# Patient Record
Sex: FEMALE | Race: WHITE | NOT HISPANIC OR LATINO | URBAN - METROPOLITAN AREA
[De-identification: names, ages, dates, MRNs, and addresses within clinical notes are randomized per-mention and may not be internally consistent; named-entity substitution may affect disease eponyms.]

---

## 2017-01-13 ENCOUNTER — EMERGENCY (EMERGENCY)
Facility: HOSPITAL | Age: 77
LOS: 1 days | Discharge: HOME | End: 2017-01-13
Attending: EMERGENCY MEDICINE | Admitting: EMERGENCY MEDICINE
Payer: MEDICARE

## 2017-01-13 VITALS
RESPIRATION RATE: 20 BRPM | WEIGHT: 143.96 LBS | HEIGHT: 64 IN | HEART RATE: 82 BPM | OXYGEN SATURATION: 98 % | TEMPERATURE: 98 F | DIASTOLIC BLOOD PRESSURE: 138 MMHG | SYSTOLIC BLOOD PRESSURE: 171 MMHG

## 2017-01-13 VITALS
RESPIRATION RATE: 18 BRPM | HEART RATE: 74 BPM | DIASTOLIC BLOOD PRESSURE: 72 MMHG | TEMPERATURE: 98 F | SYSTOLIC BLOOD PRESSURE: 140 MMHG | OXYGEN SATURATION: 97 %

## 2017-01-13 DIAGNOSIS — Z98.49 CATARACT EXTRACTION STATUS, UNSPECIFIED EYE: Chronic | ICD-10-CM

## 2017-01-13 DIAGNOSIS — Z98.89 OTHER SPECIFIED POSTPROCEDURAL STATES: Chronic | ICD-10-CM

## 2017-01-13 LAB
ALBUMIN SERPL ELPH-MCNC: 4.3 G/DL — SIGNIFICANT CHANGE UP (ref 3.3–5.2)
ALP SERPL-CCNC: 78 U/L — SIGNIFICANT CHANGE UP (ref 40–120)
ALT FLD-CCNC: 9 U/L — SIGNIFICANT CHANGE UP
ANION GAP SERPL CALC-SCNC: 16 MMOL/L — SIGNIFICANT CHANGE UP (ref 5–17)
AST SERPL-CCNC: 13 U/L — SIGNIFICANT CHANGE UP
BILIRUB SERPL-MCNC: 0.2 MG/DL — LOW (ref 0.4–2)
BUN SERPL-MCNC: 15 MG/DL — SIGNIFICANT CHANGE UP (ref 8–20)
CALCIUM SERPL-MCNC: 9.2 MG/DL — SIGNIFICANT CHANGE UP (ref 8.6–10.2)
CHLORIDE SERPL-SCNC: 101 MMOL/L — SIGNIFICANT CHANGE UP (ref 98–107)
CK SERPL-CCNC: 48 U/L — SIGNIFICANT CHANGE UP (ref 25–170)
CO2 SERPL-SCNC: 23 MMOL/L — SIGNIFICANT CHANGE UP (ref 22–29)
CREAT SERPL-MCNC: 0.62 MG/DL — SIGNIFICANT CHANGE UP (ref 0.5–1.3)
GLUCOSE SERPL-MCNC: 101 MG/DL — SIGNIFICANT CHANGE UP (ref 70–115)
HCT VFR BLD CALC: 41.7 % — SIGNIFICANT CHANGE UP (ref 37–47)
HGB BLD-MCNC: 13.8 G/DL — SIGNIFICANT CHANGE UP (ref 12–16)
MCHC RBC-ENTMCNC: 27.2 PG — SIGNIFICANT CHANGE UP (ref 27–31)
MCHC RBC-ENTMCNC: 33.1 G/DL — SIGNIFICANT CHANGE UP (ref 32–36)
MCV RBC AUTO: 82.1 FL — SIGNIFICANT CHANGE UP (ref 81–99)
PLATELET # BLD AUTO: 176 K/UL — SIGNIFICANT CHANGE UP (ref 150–400)
POTASSIUM SERPL-MCNC: 4.1 MMOL/L — SIGNIFICANT CHANGE UP (ref 3.5–5.3)
POTASSIUM SERPL-SCNC: 4.1 MMOL/L — SIGNIFICANT CHANGE UP (ref 3.5–5.3)
PROT SERPL-MCNC: 7.4 G/DL — SIGNIFICANT CHANGE UP (ref 6.6–8.7)
RBC # BLD: 5.08 M/UL — SIGNIFICANT CHANGE UP (ref 4.4–5.2)
RBC # FLD: 14 % — SIGNIFICANT CHANGE UP (ref 11–15.6)
SODIUM SERPL-SCNC: 140 MMOL/L — SIGNIFICANT CHANGE UP (ref 135–145)
TROPONIN T SERPL-MCNC: <0.01 NG/ML — SIGNIFICANT CHANGE UP (ref 0–0.06)
WBC # BLD: 7.59 K/UL — SIGNIFICANT CHANGE UP (ref 4.8–10.8)
WBC # FLD AUTO: 7.59 K/UL — SIGNIFICANT CHANGE UP (ref 4.8–10.8)

## 2017-01-13 PROCEDURE — 70450 CT HEAD/BRAIN W/O DYE: CPT | Mod: 26

## 2017-01-13 PROCEDURE — 70450 CT HEAD/BRAIN W/O DYE: CPT

## 2017-01-13 PROCEDURE — 93005 ELECTROCARDIOGRAM TRACING: CPT

## 2017-01-13 PROCEDURE — 84484 ASSAY OF TROPONIN QUANT: CPT

## 2017-01-13 PROCEDURE — 80053 COMPREHEN METABOLIC PANEL: CPT

## 2017-01-13 PROCEDURE — 99284 EMERGENCY DEPT VISIT MOD MDM: CPT

## 2017-01-13 PROCEDURE — 85027 COMPLETE CBC AUTOMATED: CPT

## 2017-01-13 PROCEDURE — 93010 ELECTROCARDIOGRAM REPORT: CPT

## 2017-01-13 PROCEDURE — 99284 EMERGENCY DEPT VISIT MOD MDM: CPT | Mod: 25

## 2017-01-13 PROCEDURE — 82550 ASSAY OF CK (CPK): CPT

## 2017-01-13 RX ORDER — SODIUM CHLORIDE 9 MG/ML
3 INJECTION INTRAMUSCULAR; INTRAVENOUS; SUBCUTANEOUS EVERY 8 HOURS
Qty: 0 | Refills: 0 | Status: DISCONTINUED | OUTPATIENT
Start: 2017-01-13 | End: 2017-01-17

## 2017-01-13 RX ORDER — DIAZEPAM 5 MG
5 TABLET ORAL ONCE
Qty: 0 | Refills: 0 | Status: DISCONTINUED | OUTPATIENT
Start: 2017-01-13 | End: 2017-01-13

## 2017-01-13 RX ADMIN — Medication 5 MILLIGRAM(S): at 08:30

## 2017-01-13 RX ADMIN — SODIUM CHLORIDE 3 MILLILITER(S): 9 INJECTION INTRAMUSCULAR; INTRAVENOUS; SUBCUTANEOUS at 05:43

## 2017-01-13 NOTE — ED ADULT NURSE REASSESSMENT NOTE - NS ED NURSE REASSESS COMMENT FT1
Pt comfortable on stretcher, VSS, transport at bedside transporting pt to test.
Late entry " Pt resting comfortably, no distress noted , MD at the bedside explaining test results, pt will be discharged home, awaiting d/c instructions

## 2017-01-13 NOTE — ED PROVIDER NOTE - PROGRESS NOTE DETAILS
Patient states has been dealing with intermittent HTN and anxiety and not sure which is causing which.  Patient states she has had "borderline HTN" for a long time but has not taken any medication.  Patient has good follow up.  Patient's BP now markedly improved while here and was high on arrival when she was anxious.  Patient states now that BP is down, she feels much better.  I will give valium for her symptoms and see if resolves.

## 2017-01-13 NOTE — ED PROVIDER NOTE - MEDICAL DECISION MAKING DETAILS
check labs and ct head and reeval PAtient, EKG, CT, and labs normal.  PAtient will f/u without fail.  Will have f/u with PCP and cards.  May need to be started on antihypertensives.  All questions answered.

## 2017-01-13 NOTE — ED PROVIDER NOTE - CARE PLAN
Principal Discharge DX:	Dizziness Principal Discharge DX:	Hypertension  Secondary Diagnosis:	Headache  Secondary Diagnosis:	Dizziness

## 2017-01-13 NOTE — ED ADULT NURSE NOTE - CHIEF COMPLAINT QUOTE
pt states "I woke up with a rush of blood to my head, became nauseous and clammy", went to urgent care the other day and dx with arrhythmia. denies vomiting, denies CP, denies headache. currently on Antivert for vertigo. scheduled for MRI

## 2017-01-13 NOTE — ED PROVIDER NOTE - OBJECTIVE STATEMENT
75 yo female with c/o dizziness and rushing "blood " in her head associated with headache described as pressure and some discomfort in her chest, pt recently diagnosed with htn not put on meds

## 2017-01-13 NOTE — ED ADULT TRIAGE NOTE - CHIEF COMPLAINT QUOTE
pt states "I woke up with a rush of blood to my head, became nauseous and clammy", went to urgent care the other day and dx with arrhythmia. denies vomiting. currently on Antivert for vertigo. scheduled for MRI pt states "I woke up with a rush of blood to my head, became nauseous and clammy", went to urgent care the other day and dx with arrhythmia. denies vomiting, denies CP, denies headache. currently on Antivert for vertigo. scheduled for MRI

## 2017-04-22 ENCOUNTER — EMERGENCY (EMERGENCY)
Facility: HOSPITAL | Age: 77
LOS: 1 days | Discharge: HOME | End: 2017-04-22
Attending: EMERGENCY MEDICINE | Admitting: EMERGENCY MEDICINE
Payer: MEDICARE

## 2017-04-22 VITALS
HEART RATE: 88 BPM | SYSTOLIC BLOOD PRESSURE: 187 MMHG | TEMPERATURE: 98 F | OXYGEN SATURATION: 100 % | DIASTOLIC BLOOD PRESSURE: 70 MMHG | RESPIRATION RATE: 18 BRPM

## 2017-04-22 DIAGNOSIS — S01.21XA LACERATION WITHOUT FOREIGN BODY OF NOSE, INITIAL ENCOUNTER: ICD-10-CM

## 2017-04-22 DIAGNOSIS — Z79.899 OTHER LONG TERM (CURRENT) DRUG THERAPY: ICD-10-CM

## 2017-04-22 DIAGNOSIS — S01.81XA LACERATION WITHOUT FOREIGN BODY OF OTHER PART OF HEAD, INITIAL ENCOUNTER: ICD-10-CM

## 2017-04-22 DIAGNOSIS — Z98.89 OTHER SPECIFIED POSTPROCEDURAL STATES: Chronic | ICD-10-CM

## 2017-04-22 DIAGNOSIS — Z88.1 ALLERGY STATUS TO OTHER ANTIBIOTIC AGENTS STATUS: ICD-10-CM

## 2017-04-22 DIAGNOSIS — Z98.890 OTHER SPECIFIED POSTPROCEDURAL STATES: ICD-10-CM

## 2017-04-22 DIAGNOSIS — S09.90XA UNSPECIFIED INJURY OF HEAD, INITIAL ENCOUNTER: ICD-10-CM
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
## 2017-04-22 DIAGNOSIS — Z88.8 ALLERGY STATUS TO OTHER DRUGS, MEDICAMENTS AND BIOLOGICAL SUBSTANCES STATUS: ICD-10-CM
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
## 2017-04-22 DIAGNOSIS — Z98.49 CATARACT EXTRACTION STATUS, UNSPECIFIED EYE: Chronic | ICD-10-CM

## 2017-04-22 DIAGNOSIS — Z88.5 ALLERGY STATUS TO NARCOTIC AGENT: ICD-10-CM
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
## 2017-04-22 PROCEDURE — 99284 EMERGENCY DEPT VISIT MOD MDM: CPT | Mod: 25

## 2017-04-22 PROCEDURE — 12013 RPR F/E/E/N/L/M 2.6-5.0 CM: CPT

## 2017-04-22 PROCEDURE — 70450 CT HEAD/BRAIN W/O DYE: CPT

## 2017-04-22 PROCEDURE — 70450 CT HEAD/BRAIN W/O DYE: CPT | Mod: 26

## 2017-04-22 RX ORDER — TETANUS TOXOID, REDUCED DIPHTHERIA TOXOID AND ACELLULAR PERTUSSIS VACCINE, ADSORBED 5; 2.5; 8; 8; 2.5 [IU]/.5ML; [IU]/.5ML; UG/.5ML; UG/.5ML; UG/.5ML
0.5 SUSPENSION INTRAMUSCULAR ONCE
Qty: 0 | Refills: 0 | Status: COMPLETED | OUTPATIENT
Start: 2017-04-22 | End: 2017-04-22

## 2017-04-22 NOTE — ED PROCEDURE NOTE - CPROC ED POST PROC CARE GUIDE1
Verbal/written post procedure instructions were given to patient/caregiver./Instructed patient/caregiver regarding signs and symptoms of infection.
Verbal/written post procedure instructions were given to patient/caregiver./Instructed patient/caregiver regarding signs and symptoms of infection.

## 2017-04-22 NOTE — ED PROVIDER NOTE - MEDICAL DECISION MAKING DETAILS
lac closed return to ed for intractable HA persitent vomitign or new onset motor or sensory deficits. lac closed return to ed for intractable HA persitent vomitign or new onset motor or sensory deficits.  long talk with patient I cannot palpate a foreign body and pt is refusing to let me explore wound risk of retained foreign body explained but pt still refusing plastics follow up given refusing tetanus risk explained she has capacity to make medical choices

## 2017-04-22 NOTE — ED PROVIDER NOTE - OBJECTIVE STATEMENT
pt presents with mechanical fall while walking with nose lac . on no blood thinners. no loc. denies fever. denies HA or neck pain. no chest pain or sob. no abd pain. no n/v/d. no urinary f/u/d. no back pain. no motor or sensory deficits. denies drug use. no recent travel. no rash. no other acute issues symptoms or concerns

## 2017-04-22 NOTE — ED ADULT NURSE NOTE - OBJECTIVE STATEMENT
Patient recd this afternoon A/Ox3, VSS, presents to ED with laceration to nose.  Patient tripped "pothole" and landed on the concrete ground.  Bleeding controlled, -LOC,denies chest pain or sob.  Respirations are even and unlabored, lungs cta, +bowel x4 quads, abdomen soft, nontender/nondistended, skin w/d/i.

## 2017-04-22 NOTE — ED ADULT NURSE NOTE - CHIEF COMPLAINT QUOTE
"I tripped over a pot hole and fell on my face".  1/2 inch  lac noted to bridge of nose. Denies any LOC

## 2017-04-22 NOTE — ED PROVIDER NOTE - PHYSICAL EXAMINATION
neuro: CN II - XII intact, EOMI, PERRL, no papilledema, 5/5 muscle strength x 4 extremities, no sensory deficits, 2+ dtr globally, negative babinski, no ataxic gait, normal BRANDON and FNT, normal romberg   heent: + 2 cm right nasal bridge superficial lac, no septal hematoma neuro: CN II - XII intact, EOMI, PERRL, no papilledema, 5/5 muscle strength x 4 extremities, no sensory deficits, 2+ dtr globally, negative babinski, no ataxic gait, normal BRANDON and FNT, normal romberg   heent: + 2 cm right nasal bridge superficial lac, no septal hematoma, 1 cm right forehad lac no foreign body

## 2017-04-22 NOTE — ED PROVIDER NOTE - CARE PLAN
Principal Discharge DX:	Fall  Secondary Diagnosis:	Laceration of nose without complication  Secondary Diagnosis:	Head injury

## 2017-11-01 ENCOUNTER — RECORD ABSTRACTING (OUTPATIENT)
Age: 77
End: 2017-11-01

## 2017-11-02 ENCOUNTER — NON-APPOINTMENT (OUTPATIENT)
Age: 77
End: 2017-11-02

## 2017-11-02 ENCOUNTER — APPOINTMENT (OUTPATIENT)
Dept: FAMILY MEDICINE | Facility: CLINIC | Age: 77
End: 2017-11-02
Payer: MEDICARE

## 2017-11-02 VITALS
HEIGHT: 64 IN | SYSTOLIC BLOOD PRESSURE: 138 MMHG | DIASTOLIC BLOOD PRESSURE: 76 MMHG | BODY MASS INDEX: 25.44 KG/M2 | WEIGHT: 149 LBS

## 2017-11-02 DIAGNOSIS — J45.909 UNSPECIFIED ASTHMA, UNCOMPLICATED: ICD-10-CM

## 2017-11-02 DIAGNOSIS — K27.9 PEPTIC ULCER, SITE UNSPECIFIED, UNSPECIFIED AS ACUTE OR CHRONIC, W/OUT HEMORRHAGE OR PERFORATION: ICD-10-CM

## 2017-11-02 LAB
BILIRUB UR QL STRIP: NORMAL
CLARITY UR: CLEAR
COLLECTION METHOD: NORMAL
GLUCOSE UR-MCNC: NORMAL
HCG UR QL: 0.2 EU/DL
HGB UR QL STRIP.AUTO: NORMAL
KETONES UR-MCNC: NORMAL
LEUKOCYTE ESTERASE UR QL STRIP: NORMAL
NITRITE UR QL STRIP: NORMAL
PH UR STRIP: 6
PROT UR STRIP-MCNC: NORMAL
SP GR UR STRIP: 1.01

## 2017-11-02 PROCEDURE — 93000 ELECTROCARDIOGRAM COMPLETE: CPT

## 2017-11-02 PROCEDURE — 81003 URINALYSIS AUTO W/O SCOPE: CPT | Mod: QW

## 2017-11-02 PROCEDURE — 36415 COLL VENOUS BLD VENIPUNCTURE: CPT

## 2017-11-02 PROCEDURE — 99214 OFFICE O/P EST MOD 30 MIN: CPT | Mod: 25

## 2017-11-10 LAB
ALBUMIN SERPL ELPH-MCNC: 4.5 G/DL
ALP BLD-CCNC: 73 U/L
ALT SERPL-CCNC: 11 U/L
ANION GAP SERPL CALC-SCNC: 18 MMOL/L
AST SERPL-CCNC: 18 U/L
BASOPHILS # BLD AUTO: 0.02 K/UL
BASOPHILS NFR BLD AUTO: 0.3 %
BILIRUB SERPL-MCNC: 0.4 MG/DL
BUN SERPL-MCNC: 16 MG/DL
CALCIUM SERPL-MCNC: 9.5 MG/DL
CHLORIDE SERPL-SCNC: 99 MMOL/L
CHOLEST SERPL-MCNC: 228 MG/DL
CHOLEST/HDLC SERPL: 3.7 RATIO
CO2 SERPL-SCNC: 25 MMOL/L
CREAT SERPL-MCNC: 0.79 MG/DL
EOSINOPHIL # BLD AUTO: 0.25 K/UL
EOSINOPHIL NFR BLD AUTO: 3.6 %
ESTIMATED AVERAGE GLUCOSE: 111 MG/DL
GLUCOSE SERPL-MCNC: 84 MG/DL
HBA1C MFR BLD HPLC: 5.5 %
HCT VFR BLD CALC: 41.6 %
HDLC SERPL-MCNC: 62 MG/DL
HGB BLD-MCNC: 13.4 G/DL
IMM GRANULOCYTES NFR BLD AUTO: 0 %
LDLC SERPL CALC-MCNC: 147 MG/DL
LYMPHOCYTES # BLD AUTO: 1.69 K/UL
LYMPHOCYTES NFR BLD AUTO: 24.5 %
MAN DIFF?: NORMAL
MCHC RBC-ENTMCNC: 26.8 PG
MCHC RBC-ENTMCNC: 32.2 GM/DL
MCV RBC AUTO: 83.2 FL
MONOCYTES # BLD AUTO: 0.55 K/UL
MONOCYTES NFR BLD AUTO: 8 %
NEUTROPHILS # BLD AUTO: 4.4 K/UL
NEUTROPHILS NFR BLD AUTO: 63.6 %
PLATELET # BLD AUTO: 219 K/UL
POTASSIUM SERPL-SCNC: 4.3 MMOL/L
PROT SERPL-MCNC: 7.5 G/DL
RBC # BLD: 5 M/UL
RBC # FLD: 15.3 %
SODIUM SERPL-SCNC: 142 MMOL/L
TRIGL SERPL-MCNC: 94 MG/DL
TSH SERPL-ACNC: 5.13 UIU/ML
WBC # FLD AUTO: 6.91 K/UL

## 2018-01-22 ENCOUNTER — APPOINTMENT (OUTPATIENT)
Dept: FAMILY MEDICINE | Facility: CLINIC | Age: 78
End: 2018-01-22
Payer: MEDICARE

## 2018-01-22 VITALS — SYSTOLIC BLOOD PRESSURE: 114 MMHG | HEIGHT: 64 IN | DIASTOLIC BLOOD PRESSURE: 70 MMHG

## 2018-01-22 PROCEDURE — 36415 COLL VENOUS BLD VENIPUNCTURE: CPT

## 2018-01-22 PROCEDURE — 99214 OFFICE O/P EST MOD 30 MIN: CPT | Mod: 25

## 2018-01-26 LAB
T3 SERPL-MCNC: 107 NG/DL
T4 SERPL-MCNC: 7.8 UG/DL
TSH SERPL-ACNC: 4.88 UIU/ML

## 2018-02-01 ENCOUNTER — RX RENEWAL (OUTPATIENT)
Age: 78
End: 2018-02-01

## 2018-03-11 ENCOUNTER — LABORATORY RESULT (OUTPATIENT)
Age: 78
End: 2018-03-11

## 2018-03-12 ENCOUNTER — APPOINTMENT (OUTPATIENT)
Dept: FAMILY MEDICINE | Facility: CLINIC | Age: 78
End: 2018-03-12
Payer: MEDICARE

## 2018-03-12 ENCOUNTER — LABORATORY RESULT (OUTPATIENT)
Age: 78
End: 2018-03-12

## 2018-03-12 VITALS — TEMPERATURE: 97.8 F | SYSTOLIC BLOOD PRESSURE: 110 MMHG | HEIGHT: 64 IN | DIASTOLIC BLOOD PRESSURE: 64 MMHG

## 2018-03-12 DIAGNOSIS — M46.1 SACROILIITIS, NOT ELSEWHERE CLASSIFIED: ICD-10-CM

## 2018-03-12 DIAGNOSIS — Z87.448 PERSONAL HISTORY OF OTHER DISEASES OF URINARY SYSTEM: ICD-10-CM

## 2018-03-12 DIAGNOSIS — R31.9 HEMATURIA, UNSPECIFIED: ICD-10-CM

## 2018-03-12 LAB
BILIRUB UR QL STRIP: NORMAL
CLARITY UR: CLEAR
COLLECTION METHOD: NORMAL
GLUCOSE UR-MCNC: NORMAL
HCG UR QL: 0.2 EU/DL
HGB UR QL STRIP.AUTO: NORMAL
KETONES UR-MCNC: NORMAL
LEUKOCYTE ESTERASE UR QL STRIP: NORMAL
NITRITE UR QL STRIP: NORMAL
PH UR STRIP: 5.5
PROT UR STRIP-MCNC: NORMAL
SP GR UR STRIP: >=1.03

## 2018-03-12 PROCEDURE — 81003 URINALYSIS AUTO W/O SCOPE: CPT | Mod: QW

## 2018-03-12 PROCEDURE — 99213 OFFICE O/P EST LOW 20 MIN: CPT | Mod: 25

## 2018-03-13 LAB
APPEARANCE: ABNORMAL
BILIRUBIN URINE: NEGATIVE
BLOOD URINE: ABNORMAL
COLOR: ABNORMAL
GLUCOSE QUALITATIVE U: NEGATIVE MG/DL
KETONES URINE: ABNORMAL
LEUKOCYTE ESTERASE URINE: ABNORMAL
NITRITE URINE: NEGATIVE
PH URINE: 5
PROTEIN URINE: ABNORMAL MG/DL
SPECIFIC GRAVITY URINE: 1.03
UROBILINOGEN URINE: 1 MG/DL

## 2018-03-14 PROBLEM — M46.1 SACROILIITIS: Status: ACTIVE | Noted: 2018-03-14

## 2019-03-04 ENCOUNTER — APPOINTMENT (OUTPATIENT)
Dept: FAMILY MEDICINE | Facility: CLINIC | Age: 79
End: 2019-03-04
Payer: MEDICARE

## 2019-03-04 VITALS
OXYGEN SATURATION: 97 % | BODY MASS INDEX: 25.44 KG/M2 | HEART RATE: 93 BPM | TEMPERATURE: 98.9 F | WEIGHT: 149 LBS | HEIGHT: 64 IN | DIASTOLIC BLOOD PRESSURE: 78 MMHG | SYSTOLIC BLOOD PRESSURE: 122 MMHG

## 2019-03-04 DIAGNOSIS — R10.32 LEFT LOWER QUADRANT PAIN: ICD-10-CM

## 2019-03-04 LAB
BILIRUB UR QL STRIP: NORMAL
CLARITY UR: CLEAR
COLLECTION METHOD: NORMAL
GLUCOSE UR-MCNC: NEGATIVE
HCG UR QL: 0.2 EU/DL
HGB UR QL STRIP.AUTO: NORMAL
KETONES UR-MCNC: NEGATIVE
LEUKOCYTE ESTERASE UR QL STRIP: NEGATIVE
NITRITE UR QL STRIP: NEGATIVE
PH UR STRIP: 6
PROT UR STRIP-MCNC: NORMAL
SP GR UR STRIP: 1.02

## 2019-03-04 PROCEDURE — 81003 URINALYSIS AUTO W/O SCOPE: CPT | Mod: QW

## 2019-03-04 PROCEDURE — 99213 OFFICE O/P EST LOW 20 MIN: CPT | Mod: 25

## 2019-03-04 RX ORDER — CEPHALEXIN 500 MG/1
500 CAPSULE ORAL 3 TIMES DAILY
Qty: 10 | Refills: 0 | Status: DISCONTINUED | COMMUNITY
Start: 2018-03-12 | End: 2019-03-04

## 2019-03-04 NOTE — PHYSICAL EXAM
[No Acute Distress] : no acute distress [Well Developed] : well developed [Normal Oropharynx] : the oropharynx was normal [Normal TMs] : both tympanic membranes were normal [No Respiratory Distress] : no respiratory distress  [Clear to Auscultation] : lungs were clear to auscultation bilaterally [No Accessory Muscle Use] : no accessory muscle use [Normal Rate] : normal rate  [Normal S1, S2] : normal S1 and S2 [Soft] : abdomen soft [Non-distended] : non-distended [No HSM] : no HSM [Normal Bowel Sounds] : normal bowel sounds [No CVA Tenderness] : no CVA  tenderness [Normal Gait] : normal gait [Normal Affect] : the affect was normal [Normal Insight/Judgement] : insight and judgment were intact [de-identified] : mild tenderness in the LLQ to deep palpation

## 2019-03-05 LAB
APPEARANCE: ABNORMAL
BACTERIA: NEGATIVE
BILIRUBIN URINE: NEGATIVE
BLOOD URINE: ABNORMAL
COLOR: ABNORMAL
GLUCOSE QUALITATIVE U: NEGATIVE
HYALINE CASTS: 0 /LPF
KETONES URINE: NEGATIVE
LEUKOCYTE ESTERASE URINE: NEGATIVE
MICROSCOPIC-UA: NORMAL
NITRITE URINE: NEGATIVE
PH URINE: 6
PROTEIN URINE: NORMAL
RED BLOOD CELLS URINE: 6 /HPF
SPECIFIC GRAVITY URINE: 1.03
SQUAMOUS EPITHELIAL CELLS: 3 /HPF
URIC ACID CRYSTALS: ABNORMAL
URINE COMMENTS: NORMAL
UROBILINOGEN URINE: NORMAL
WHITE BLOOD CELLS URINE: 2 /HPF

## 2019-03-11 LAB — BACTERIA UR CULT: NORMAL

## 2020-02-13 ENCOUNTER — APPOINTMENT (OUTPATIENT)
Dept: FAMILY MEDICINE | Facility: CLINIC | Age: 80
End: 2020-02-13

## 2020-04-05 NOTE — ED PROVIDER NOTE - EYES, MLM
Clear bilaterally, pupils equal, round and reactive to light.
Yes - the patient is able to be screened

## 2020-10-06 ENCOUNTER — APPOINTMENT (OUTPATIENT)
Dept: FAMILY MEDICINE | Facility: CLINIC | Age: 80
End: 2020-10-06
Payer: MEDICARE

## 2020-10-06 ENCOUNTER — NON-APPOINTMENT (OUTPATIENT)
Age: 80
End: 2020-10-06

## 2020-10-06 VITALS
SYSTOLIC BLOOD PRESSURE: 148 MMHG | DIASTOLIC BLOOD PRESSURE: 88 MMHG | HEIGHT: 64 IN | BODY MASS INDEX: 27.49 KG/M2 | WEIGHT: 161 LBS | HEART RATE: 96 BPM

## 2020-10-06 DIAGNOSIS — G89.29 PAIN IN RIGHT KNEE: ICD-10-CM

## 2020-10-06 DIAGNOSIS — F41.9 ANXIETY DISORDER, UNSPECIFIED: ICD-10-CM

## 2020-10-06 DIAGNOSIS — M25.561 PAIN IN RIGHT KNEE: ICD-10-CM

## 2020-10-06 DIAGNOSIS — M25.562 PAIN IN RIGHT KNEE: ICD-10-CM

## 2020-10-06 DIAGNOSIS — R42 DIZZINESS AND GIDDINESS: ICD-10-CM

## 2020-10-06 LAB
BILIRUB UR QL STRIP: NORMAL
CLARITY UR: CLEAR
COLLECTION METHOD: NORMAL
GLUCOSE UR-MCNC: NORMAL
HCG UR QL: 0.2 EU/DL
HGB UR QL STRIP.AUTO: NORMAL
KETONES UR-MCNC: NORMAL
LEUKOCYTE ESTERASE UR QL STRIP: NORMAL
NITRITE UR QL STRIP: NORMAL
PH UR STRIP: 6.5
PROT UR STRIP-MCNC: NORMAL
SP GR UR STRIP: 1.02

## 2020-10-06 PROCEDURE — G0439: CPT

## 2020-10-06 PROCEDURE — 81003 URINALYSIS AUTO W/O SCOPE: CPT | Mod: QW

## 2020-10-06 PROCEDURE — G0438: CPT

## 2020-10-06 PROCEDURE — 36415 COLL VENOUS BLD VENIPUNCTURE: CPT

## 2020-10-06 PROCEDURE — 93000 ELECTROCARDIOGRAM COMPLETE: CPT

## 2020-10-07 ENCOUNTER — LABORATORY RESULT (OUTPATIENT)
Age: 80
End: 2020-10-07

## 2020-10-09 NOTE — HEALTH RISK ASSESSMENT
[Very Good] : ~his/her~  mood as very good [] : No [No] : In the past 12 months have you used drugs other than those required for medical reasons? No [No falls in past year] : Patient reported no falls in the past year [0] : 2) Feeling down, depressed, or hopeless: Not at all (0) [Fully functional (bathing, dressing, toileting, transferring, walking, feeding)] : Fully functional (bathing, dressing, toileting, transferring, walking, feeding) [Fully functional (using the telephone, shopping, preparing meals, housekeeping, doing laundry, using] : Fully functional and needs no help or supervision to perform IADLs (using the telephone, shopping, preparing meals, housekeeping, doing laundry, using transportation, managing medications and managing finances)

## 2020-10-09 NOTE — COUNSELING
[Fall prevention counseling provided] : Fall prevention counseling provided [Adequate lighting] : Adequate lighting [No throw rugs] : No throw rugs [Use proper foot wear] : Use proper foot wear [Sleep ___ hours/day] : Sleep [unfilled] hours/day [Engage in a relaxing activity] : Engage in a relaxing activity

## 2020-10-20 LAB
ALBUMIN SERPL ELPH-MCNC: 4.6 G/DL
ALP BLD-CCNC: 74 U/L
ALT SERPL-CCNC: 11 U/L
ANION GAP SERPL CALC-SCNC: 14 MMOL/L
AST SERPL-CCNC: 14 U/L
BASOPHILS # BLD AUTO: 0.03 K/UL
BASOPHILS NFR BLD AUTO: 0.4 %
BILIRUB SERPL-MCNC: 0.3 MG/DL
BUN SERPL-MCNC: 15 MG/DL
CALCIUM SERPL-MCNC: 9.4 MG/DL
CHLORIDE SERPL-SCNC: 100 MMOL/L
CHOLEST SERPL-MCNC: 244 MG/DL
CHOLEST/HDLC SERPL: 4.2 RATIO
CO2 SERPL-SCNC: 26 MMOL/L
CREAT SERPL-MCNC: 0.69 MG/DL
EOSINOPHIL # BLD AUTO: 0.21 K/UL
EOSINOPHIL NFR BLD AUTO: 2.9 %
ESTIMATED AVERAGE GLUCOSE: 120 MG/DL
GLUCOSE SERPL-MCNC: 92 MG/DL
HBA1C MFR BLD HPLC: 5.8 %
HCT VFR BLD CALC: 44.3 %
HDLC SERPL-MCNC: 58 MG/DL
HGB BLD-MCNC: 13.8 G/DL
IMM GRANULOCYTES NFR BLD AUTO: 0.1 %
LDLC SERPL CALC-MCNC: 158 MG/DL
LYMPHOCYTES # BLD AUTO: 1.58 K/UL
LYMPHOCYTES NFR BLD AUTO: 22.2 %
MAN DIFF?: NORMAL
MCHC RBC-ENTMCNC: 26.4 PG
MCHC RBC-ENTMCNC: 31.2 GM/DL
MCV RBC AUTO: 84.7 FL
MONOCYTES # BLD AUTO: 0.63 K/UL
MONOCYTES NFR BLD AUTO: 8.8 %
NEUTROPHILS # BLD AUTO: 4.67 K/UL
NEUTROPHILS NFR BLD AUTO: 65.6 %
PLATELET # BLD AUTO: 228 K/UL
POTASSIUM SERPL-SCNC: 4.1 MMOL/L
PROT SERPL-MCNC: 7.3 G/DL
RBC # BLD: 5.23 M/UL
RBC # FLD: 15 %
SODIUM SERPL-SCNC: 139 MMOL/L
TRIGL SERPL-MCNC: 137 MG/DL
TSH SERPL-ACNC: 5.91 UIU/ML
WBC # FLD AUTO: 7.13 K/UL

## 2020-10-29 ENCOUNTER — APPOINTMENT (OUTPATIENT)
Dept: ORTHOPEDIC SURGERY | Facility: CLINIC | Age: 80
End: 2020-10-29
Payer: MEDICARE

## 2020-10-29 ENCOUNTER — APPOINTMENT (OUTPATIENT)
Dept: ORTHOPEDIC SURGERY | Facility: CLINIC | Age: 80
End: 2020-10-29

## 2020-10-29 VITALS
DIASTOLIC BLOOD PRESSURE: 106 MMHG | HEIGHT: 64 IN | HEART RATE: 99 BPM | SYSTOLIC BLOOD PRESSURE: 181 MMHG | BODY MASS INDEX: 27.49 KG/M2 | WEIGHT: 161 LBS

## 2020-10-29 DIAGNOSIS — M25.562 PAIN IN RIGHT KNEE: ICD-10-CM

## 2020-10-29 DIAGNOSIS — M25.561 PAIN IN RIGHT KNEE: ICD-10-CM

## 2020-10-29 PROCEDURE — 20610 DRAIN/INJ JOINT/BURSA W/O US: CPT | Mod: RT

## 2020-10-29 PROCEDURE — 99204 OFFICE O/P NEW MOD 45 MIN: CPT | Mod: 25

## 2020-10-29 RX ORDER — ALBUTEROL SULFATE 2.5 MG/3ML
(2.5 MG/3ML) SOLUTION RESPIRATORY (INHALATION)
Refills: 0 | Status: DISCONTINUED | COMMUNITY
End: 2020-10-29

## 2020-10-29 RX ORDER — MECLIZINE HYDROCHLORIDE 12.5 MG/1
12.5 TABLET ORAL DAILY
Qty: 40 | Refills: 1 | Status: DISCONTINUED | COMMUNITY
Start: 2018-01-22 | End: 2020-10-29

## 2020-10-29 RX ORDER — HYDROCHLOROTHIAZIDE 12.5 MG/1
12.5 CAPSULE ORAL WEEKLY
Qty: 20 | Refills: 0 | Status: DISCONTINUED | COMMUNITY
Start: 2017-11-02 | End: 2020-10-29

## 2020-10-29 NOTE — PHYSICAL EXAM
[de-identified] : The patient appears well nourished  and in no apparent distress.  The patient is alert and oriented to person, place, and time.   Affect and mood appear normal. The head is normocephalic and atraumatic.  The eyes reveal normal sclera and extra ocular muscles are intact. The tongue is midline with no apparent lesions.  Skin shows normal turgor with no evidence of eczema or psoriasis.  No respiratory distress noted.  Sensation grossly intact.\par   [de-identified] : Exam of the right knee shows 17 degrees of valgus which is partially correctable, MCL has 3-5 mm laxity, but there is an endpoint, -3 to 110 degrees of flexion measured with a goniometer. 5/5 motor strength bilaterally distally. Sensation intact distally.  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

## 2020-10-29 NOTE — HISTORY OF PRESENT ILLNESS
[de-identified] : Patient is an 80-year-old female presenting for evaluation of longstanding right knee pain.  Her right knee pain is localized laterally and posteriorly.  She notes the pain is worse with sitting long periods of time without standing from a seated position.  She also has stiffness and swelling of the knee worse throughout the day.  She denies any falls or trauma.  She has not had any previous surgery in this right knee.  Patient previously diagnosed with osteoarthritis of the right knee and has been receiving injections in the past.  Most recent injection was a steroid shot approximately 2 years ago which only worked temporarily.  She is tried therapy and home exercise with no improvement.  Patient has been taking Tylenol over-the-counter anti-inflammatories as needed for pain with little relief.  Presents today hopeful to discuss treatment options but wishes to avoid surgery.

## 2020-10-29 NOTE — CONSULT LETTER
[Dear  ___] : Dear  [unfilled], [Consult Letter:] : I had the pleasure of evaluating your patient, [unfilled]. [Please see my note below.] : Please see my note below. [Consult Closing:] : Thank you very much for allowing me to participate in the care of this patient.  If you have any questions, please do not hesitate to contact me. [Sincerely,] : Sincerely, [FreeTextEntry2] : YANIRA WILSON MD\par  [FreeTextEntry3] : Atul Sahni MD\par Chief of Joint Replacement\par [facility] \par [facility]\par \par

## 2020-10-29 NOTE — DISCUSSION/SUMMARY
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 Conservative options were discussed. She was given a cortisone injection in the right knee today. She was given a hinge knee brace. She may be a future candidate for right knee replacement, but wishes to continue with conservative treatment as she is fearful of anesthesia. She may follow up in 6 weeks for repeat evaluation.

## 2020-10-29 NOTE — ADDENDUM
[FreeTextEntry1] : This note was authored by Ted Smith working as a medical scribe for Dr. Atul Sahni. The note was reviewed, edited, and revised by Dr. Atul Sahni whom is in agreement with the exam findings, imaging findings, and treatment plan. 10/29/2020.

## 2020-10-29 NOTE — PROCEDURE
[de-identified] : Using sterile technique, 2cc of depomedrol 40mg/ml, 4cc of 1% plain lidocaine, and 2 cc 0.25% marcaine was drawn up into a sterile syringe. The right knee was then sterilely prepped with chlorhexidine. Ethyl chloride spray was used to anesthetize the skin and subQ tissue. The depomedrol/lidocaine/marcaine mixture was then injected into the knee joint in the anterolateral position. The patient tolerated the procedure well without difficulty. The patient was given instructions on the use of ice and anti-inflammatories post injection site soreness.\par

## 2020-11-03 ENCOUNTER — NON-APPOINTMENT (OUTPATIENT)
Age: 80
End: 2020-11-03

## 2020-11-09 ENCOUNTER — APPOINTMENT (OUTPATIENT)
Dept: FAMILY MEDICINE | Facility: CLINIC | Age: 80
End: 2020-11-09
Payer: MEDICARE

## 2020-11-09 VITALS
BODY MASS INDEX: 27.49 KG/M2 | SYSTOLIC BLOOD PRESSURE: 130 MMHG | DIASTOLIC BLOOD PRESSURE: 82 MMHG | WEIGHT: 161 LBS | OXYGEN SATURATION: 98 % | HEIGHT: 64 IN | HEART RATE: 74 BPM

## 2020-11-09 DIAGNOSIS — E03.9 HYPOTHYROIDISM, UNSPECIFIED: ICD-10-CM

## 2020-11-09 PROCEDURE — 99213 OFFICE O/P EST LOW 20 MIN: CPT

## 2020-11-10 NOTE — PHYSICAL EXAM
[Normal] : no rash [Normal Affect] : the affect was normal [Alert and Oriented x3] : oriented to person, place, and time [Normal Insight/Judgement] : insight and judgment were intact

## 2020-11-10 NOTE — HISTORY OF PRESENT ILLNESS
[FreeTextEntry1] : patient here for follow up [de-identified] : NO NEW COMPLAINT\par NEED TO REVIEW LABS AND THROID

## 2020-11-10 NOTE — REVIEW OF SYSTEMS
[Fatigue] : fatigue [Constipation] : constipation [Negative] : Respiratory [FreeTextEntry2] : DIFFICULTY LOSING WT

## 2020-12-07 ENCOUNTER — OUTPATIENT (OUTPATIENT)
Dept: OUTPATIENT SERVICES | Facility: HOSPITAL | Age: 80
LOS: 1 days | End: 2020-12-07

## 2020-12-07 ENCOUNTER — TRANSCRIPTION ENCOUNTER (OUTPATIENT)
Age: 80
End: 2020-12-07

## 2020-12-07 ENCOUNTER — APPOINTMENT (OUTPATIENT)
Dept: ULTRASOUND IMAGING | Facility: CLINIC | Age: 80
End: 2020-12-07
Payer: MEDICARE

## 2020-12-07 DIAGNOSIS — Z98.89 OTHER SPECIFIED POSTPROCEDURAL STATES: Chronic | ICD-10-CM

## 2020-12-07 DIAGNOSIS — Z98.49 CATARACT EXTRACTION STATUS, UNSPECIFIED EYE: Chronic | ICD-10-CM

## 2020-12-07 DIAGNOSIS — Z00.00 ENCOUNTER FOR GENERAL ADULT MEDICAL EXAMINATION WITHOUT ABNORMAL FINDINGS: ICD-10-CM

## 2020-12-07 PROCEDURE — 93971 EXTREMITY STUDY: CPT | Mod: 26,LT

## 2020-12-14 ENCOUNTER — APPOINTMENT (OUTPATIENT)
Dept: VASCULAR SURGERY | Facility: CLINIC | Age: 80
End: 2020-12-14
Payer: MEDICARE

## 2020-12-14 VITALS
WEIGHT: 145 LBS | BODY MASS INDEX: 24.75 KG/M2 | OXYGEN SATURATION: 94 % | TEMPERATURE: 98.6 F | SYSTOLIC BLOOD PRESSURE: 164 MMHG | HEIGHT: 64 IN | HEART RATE: 85 BPM | DIASTOLIC BLOOD PRESSURE: 78 MMHG

## 2020-12-14 DIAGNOSIS — I83.813 VARICOSE VEINS OF BILATERAL LOWER EXTREMITIES WITH PAIN: ICD-10-CM

## 2020-12-14 DIAGNOSIS — I87.2 VENOUS INSUFFICIENCY (CHRONIC) (PERIPHERAL): ICD-10-CM

## 2020-12-14 PROCEDURE — XXXXX: CPT

## 2020-12-14 PROCEDURE — 93970 EXTREMITY STUDY: CPT

## 2020-12-15 ENCOUNTER — APPOINTMENT (OUTPATIENT)
Dept: ENDOCRINOLOGY | Facility: CLINIC | Age: 80
End: 2020-12-15

## 2021-01-12 ENCOUNTER — APPOINTMENT (OUTPATIENT)
Dept: FAMILY MEDICINE | Facility: CLINIC | Age: 81
End: 2021-01-12

## 2021-01-28 ENCOUNTER — APPOINTMENT (OUTPATIENT)
Dept: ENDOCRINOLOGY | Facility: CLINIC | Age: 81
End: 2021-01-28

## 2021-02-18 NOTE — ED ADULT NURSE NOTE - MUSCULOSKELETAL WDL
Annabelle Stewart/PA Full range of motion of upper and lower extremities, no joint tenderness/swelling.

## 2021-02-24 ENCOUNTER — NON-APPOINTMENT (OUTPATIENT)
Age: 81
End: 2021-02-24

## 2021-02-24 ENCOUNTER — APPOINTMENT (OUTPATIENT)
Dept: CARDIOLOGY | Facility: CLINIC | Age: 81
End: 2021-02-24
Payer: MEDICARE

## 2021-02-24 VITALS
WEIGHT: 130 LBS | DIASTOLIC BLOOD PRESSURE: 62 MMHG | HEART RATE: 77 BPM | BODY MASS INDEX: 22.2 KG/M2 | OXYGEN SATURATION: 97 % | HEIGHT: 64 IN | SYSTOLIC BLOOD PRESSURE: 140 MMHG

## 2021-02-24 DIAGNOSIS — R07.9 CHEST PAIN, UNSPECIFIED: ICD-10-CM

## 2021-02-24 PROCEDURE — 99204 OFFICE O/P NEW MOD 45 MIN: CPT | Mod: 25

## 2021-02-24 PROCEDURE — 93000 ELECTROCARDIOGRAM COMPLETE: CPT

## 2021-02-24 RX ORDER — LEVOTHYROXINE SODIUM 0.03 MG/1
25 TABLET ORAL DAILY
Qty: 30 | Refills: 3 | Status: DISCONTINUED | COMMUNITY
Start: 2020-11-09 | End: 2021-02-24

## 2021-02-24 NOTE — HISTORY OF PRESENT ILLNESS
[FreeTextEntry1] : Pt is an 79 y/o F who is referred here today by their PCP for evaluation.  She is accompanied by her daughter.  She has PMH anxiety.  She tells me that she has recently had issues with GERD and has been started on PPI.  The GI Dr may want to do EGD/colonoscopy.   She notes that she has been getting CP, PPI has made it better but she is still uncomfortable.  She denies CP, SOB, diaphoresis, palpitations, dizziness, syncope, LE edema, PND, orthopnea. \par \par TTE 01/2021 EF 60-65%, mild MR, mild-mod AI\par TTE 01/2017 EF 70%, mild DD, mild-mod MR, mild TR\par stress echo 01/2017 borderline study, possible small region of apical anteroseptal insufficiency\par \par PMH: HLD, hypothyroidism, GERD\par PCP [place]\par Smoking status: never\par no ETOH\par no drug use\par Current exercise: none\par Daily water intake: 32 oz\par Daily caffeine intake: none\par Family hx: father CVA 73, sister colon cancer\par Previous hospitalizations: 01/2021 allergic reaction, phlebitis\par 4 children - no problem with pregnancies\par

## 2021-02-24 NOTE — DISCUSSION/SUMMARY
[FreeTextEntry1] : Pt is an 79 y/o F who is referred here today by their PCP for evaluation.  She is accompanied by her daughter.  She has PMH anxiety.  She tells me that she has recently had issues with GERD and has been started on PPI.  The GI Dr may want to do EGD/colonoscopy.   She notes that she has been getting CP, PPI has made it better but she is still uncomfortable.  She denies CP, SOB, diaphoresis, palpitations, dizziness, syncope, LE edema, PND, orthopnea. \par \par TTE 01/2021 EF 60-65%, mild MR, mild-mod AI\par TTE 01/2017 EF 70%, mild DD, mild-mod MR, mild TR\par stress echo 01/2017 borderline study, possible small region of apical anteroseptal insufficiency\par \par Would like to check nuclear stress test but pt is very anxious about any injections.  She will think about this.  She also has a bad knee and is currently getting injections, unsure if she would be able to hit target HR on treadmill\par Further preop recommendations will be made once diagnostic testing is complete. \par The described plan was discussed with the pt and daughter.  All questions and concerns were addressed to the best of my knowledge.

## 2021-03-20 ENCOUNTER — TRANSCRIPTION ENCOUNTER (OUTPATIENT)
Age: 81
End: 2021-03-20

## 2021-04-02 ENCOUNTER — APPOINTMENT (OUTPATIENT)
Dept: CARDIOLOGY | Facility: CLINIC | Age: 81
End: 2021-04-02

## 2021-04-06 ENCOUNTER — APPOINTMENT (OUTPATIENT)
Dept: CARDIOLOGY | Facility: CLINIC | Age: 81
End: 2021-04-06
Payer: MEDICARE

## 2021-04-06 PROCEDURE — 93880 EXTRACRANIAL BILAT STUDY: CPT | Mod: 26

## 2021-05-16 ENCOUNTER — TRANSCRIPTION ENCOUNTER (OUTPATIENT)
Age: 81
End: 2021-05-16

## 2021-05-17 ENCOUNTER — APPOINTMENT (OUTPATIENT)
Dept: CARDIOLOGY | Facility: CLINIC | Age: 81
End: 2021-05-17
Payer: MEDICARE

## 2021-05-17 ENCOUNTER — APPOINTMENT (OUTPATIENT)
Dept: CARDIOLOGY | Facility: CLINIC | Age: 81
End: 2021-05-17

## 2021-05-17 VITALS
WEIGHT: 127 LBS | OXYGEN SATURATION: 98 % | BODY MASS INDEX: 22.5 KG/M2 | DIASTOLIC BLOOD PRESSURE: 72 MMHG | HEIGHT: 63 IN | HEART RATE: 72 BPM | SYSTOLIC BLOOD PRESSURE: 125 MMHG

## 2021-05-17 DIAGNOSIS — R55 SYNCOPE AND COLLAPSE: ICD-10-CM

## 2021-05-17 DIAGNOSIS — R53.1 WEAKNESS: ICD-10-CM

## 2021-05-17 PROCEDURE — 99214 OFFICE O/P EST MOD 30 MIN: CPT | Mod: 25

## 2021-05-17 PROCEDURE — 93242 EXT ECG>48HR<7D RECORDING: CPT

## 2021-05-17 PROCEDURE — 93000 ELECTROCARDIOGRAM COMPLETE: CPT

## 2021-05-17 PROCEDURE — ZZZZZ: CPT

## 2021-05-17 RX ORDER — DIAZEPAM 5 MG/1
5 TABLET ORAL
Qty: 20 | Refills: 0 | Status: DISCONTINUED | COMMUNITY
Start: 2018-03-12 | End: 2021-05-17

## 2021-05-17 RX ORDER — PANTOPRAZOLE 20 MG/1
20 TABLET, DELAYED RELEASE ORAL
Refills: 0 | Status: DISCONTINUED | COMMUNITY
Start: 2021-02-24 | End: 2021-05-17

## 2021-05-17 NOTE — DISCUSSION/SUMMARY
[FreeTextEntry1] : Pt is an 82 y/o F who presents with weakness/presyncope likely due to dehydration, fatigue\par \par TTE 01/2021 EF 60-65%, mild MR, mild-mod AI\par TTE 01/2017 EF 70%, mild DD, mild-mod MR, mild TR\par stress echo 01/2017 borderline study, possible small region of apical anteroseptal insufficiency\par \par Given pt's symptoms will check montano monitor to assess for ectopy.  Advised adequate hydration.  Drug use, OTC medication use reviewed \par Would like to check nuclear stress test but pt is very anxious about any injections.  She will think about this.  \par check labs today\par Advised pt to go to the nearest ED if symptoms persist or worsen \par \par The described plan was discussed with the pt and daughter.  All questions and concerns were addressed to the best of my knowledge.
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
## 2021-05-18 LAB
25(OH)D3 SERPL-MCNC: 21 NG/ML
ALBUMIN SERPL ELPH-MCNC: 4.3 G/DL
ALP BLD-CCNC: 88 U/L
ALT SERPL-CCNC: 12 U/L
ANION GAP SERPL CALC-SCNC: 11 MMOL/L
AST SERPL-CCNC: 19 U/L
BASOPHILS # BLD AUTO: 0.04 K/UL
BASOPHILS NFR BLD AUTO: 0.6 %
BILIRUB SERPL-MCNC: 0.4 MG/DL
BUN SERPL-MCNC: 21 MG/DL
CALCIUM SERPL-MCNC: 9.6 MG/DL
CHLORIDE SERPL-SCNC: 103 MMOL/L
CHOLEST SERPL-MCNC: 207 MG/DL
CO2 SERPL-SCNC: 28 MMOL/L
CREAT SERPL-MCNC: 0.8 MG/DL
EOSINOPHIL # BLD AUTO: 0.1 K/UL
EOSINOPHIL NFR BLD AUTO: 1.5 %
ESTIMATED AVERAGE GLUCOSE: 111 MG/DL
FOLATE SERPL-MCNC: 5.7 NG/ML
GLUCOSE SERPL-MCNC: 108 MG/DL
HBA1C MFR BLD HPLC: 5.5 %
HCT VFR BLD CALC: 42.9 %
HDLC SERPL-MCNC: 59 MG/DL
HGB BLD-MCNC: 13.5 G/DL
IMM GRANULOCYTES NFR BLD AUTO: 0.3 %
LDLC SERPL CALC-MCNC: 128 MG/DL
LYMPHOCYTES # BLD AUTO: 1.31 K/UL
LYMPHOCYTES NFR BLD AUTO: 19.7 %
MAGNESIUM SERPL-MCNC: 2.3 MG/DL
MAN DIFF?: NORMAL
MCHC RBC-ENTMCNC: 26.5 PG
MCHC RBC-ENTMCNC: 31.5 GM/DL
MCV RBC AUTO: 84.3 FL
MONOCYTES # BLD AUTO: 0.61 K/UL
MONOCYTES NFR BLD AUTO: 9.2 %
NEUTROPHILS # BLD AUTO: 4.58 K/UL
NEUTROPHILS NFR BLD AUTO: 68.7 %
NONHDLC SERPL-MCNC: 148 MG/DL
PLATELET # BLD AUTO: 203 K/UL
POTASSIUM SERPL-SCNC: 4.5 MMOL/L
PROT SERPL-MCNC: 7 G/DL
RBC # BLD: 5.09 M/UL
RBC # FLD: 15.6 %
SODIUM SERPL-SCNC: 142 MMOL/L
TRIGL SERPL-MCNC: 98 MG/DL
TSH SERPL-ACNC: 3.62 UIU/ML
VIT B12 SERPL-MCNC: 330 PG/ML
WBC # FLD AUTO: 6.66 K/UL

## 2021-05-20 ENCOUNTER — TRANSCRIPTION ENCOUNTER (OUTPATIENT)
Age: 81
End: 2021-05-20

## 2021-06-20 PROCEDURE — 93244 EXT ECG>48HR<7D REV&INTERPJ: CPT

## 2021-06-22 ENCOUNTER — NON-APPOINTMENT (OUTPATIENT)
Age: 81
End: 2021-06-22

## 2021-12-06 ENCOUNTER — TRANSCRIPTION ENCOUNTER (OUTPATIENT)
Age: 81
End: 2021-12-06

## 2022-04-19 ENCOUNTER — RX RENEWAL (OUTPATIENT)
Age: 82
End: 2022-04-19

## 2022-04-21 ENCOUNTER — RX RENEWAL (OUTPATIENT)
Age: 82
End: 2022-04-21

## 2022-06-21 ENCOUNTER — TRANSCRIPTION ENCOUNTER (OUTPATIENT)
Age: 82
End: 2022-06-21

## 2022-07-13 NOTE — REASON FOR VISIT
July 13, 2022     Patient: Janelle Bonilla  YOB: 2007  Date of Visit: 7/13/2022      To Whom it May Concern:    Chantal Maxwell is under my professional care  Adán Holland was seen in my office on 7/13/2022  Adán Holland should not return to gym class or sports until cleared by a physician  If you have any questions or concerns, please don't hesitate to call           Sincerely,          Vita Frederick MD        CC: No Recipients [Initial Visit] : an initial visit for [Other: ____] : [unfilled]

## 2022-10-14 NOTE — ED ADULT NURSE NOTE - TOBACCO USE
Pre-Operative Diagnosis: CENTRAL PERFORATION OF TYMPANIC MEMBRANE, LEFT EAR     Post-Operative Diagnosis: SAME     Procedure Performed:   TYPE ONE TRANSCANAL TYMPANOPLASTY - LEFT EAR    Surgeon(s) and Role:     Bahman Storm MD - Primary    Assistant(s):        Surgical Findings: Left ear - 25% posterior inferior central  TM perforation      Specimen: None     Estimated Blood Loss: 1 ml      Haley Carty MD  10/14/2022  3:57 PM Never smoker

## 2023-07-23 NOTE — HISTORY OF PRESENT ILLNESS
[FreeTextEntry8] : Cheryl is a 78-year old female who presents with increased urinary frequency for 2 weeks. Denies fevers, chills, hematuria, dysuria, malodorous urine.\par She also complains of lower abdominal pain that started 1 day ago. She reports mostly feeling it on her left lower abdominal area. It is intermittent. Denies fevers, nausea, vomiting, diarrhea. Had a regular BM today.\par Reports that she had a piece of bread that nuts in it, and that she was once told that she has a diverticulosis - however, never had a colonoscopy or CT for diagnosis. \par \par She also reports that the she is allergic to everything except penicillin. Reports allergy to amoxicillin and all related antibiotics.\par \par Also reports that her vertigo is acting up - she reports that she used to take Antivert which offered relief but was then prescribed the generic version and did not want to take it. [Family Member] : family member soft/tender...

## 2023-10-26 ENCOUNTER — APPOINTMENT (OUTPATIENT)
Dept: ORTHOPEDIC SURGERY | Facility: CLINIC | Age: 83
End: 2023-10-26
Payer: MEDICARE

## 2023-10-26 VITALS
WEIGHT: 127 LBS | HEIGHT: 63 IN | DIASTOLIC BLOOD PRESSURE: 79 MMHG | HEART RATE: 73 BPM | BODY MASS INDEX: 22.5 KG/M2 | SYSTOLIC BLOOD PRESSURE: 173 MMHG

## 2023-10-26 PROCEDURE — 73564 X-RAY EXAM KNEE 4 OR MORE: CPT | Mod: 50

## 2023-10-26 PROCEDURE — 99213 OFFICE O/P EST LOW 20 MIN: CPT

## 2023-11-26 ENCOUNTER — TRANSCRIPTION ENCOUNTER (OUTPATIENT)
Age: 83
End: 2023-11-26

## 2023-11-26 ENCOUNTER — INPATIENT (INPATIENT)
Facility: HOSPITAL | Age: 83
LOS: 3 days | Discharge: NURSING, REHAB OR LONG-TERM CARE | DRG: 522 | End: 2023-11-30
Attending: ORTHOPAEDIC SURGERY | Admitting: ORTHOPAEDIC SURGERY
Payer: MEDICARE

## 2023-11-26 VITALS
HEIGHT: 63 IN | DIASTOLIC BLOOD PRESSURE: 104 MMHG | HEART RATE: 79 BPM | OXYGEN SATURATION: 97 % | WEIGHT: 134.92 LBS | RESPIRATION RATE: 18 BRPM | TEMPERATURE: 97 F | SYSTOLIC BLOOD PRESSURE: 184 MMHG

## 2023-11-26 DIAGNOSIS — S72.009A FRACTURE OF UNSPECIFIED PART OF NECK OF UNSPECIFIED FEMUR, INITIAL ENCOUNTER FOR CLOSED FRACTURE: ICD-10-CM

## 2023-11-26 DIAGNOSIS — Z98.89 OTHER SPECIFIED POSTPROCEDURAL STATES: Chronic | ICD-10-CM

## 2023-11-26 DIAGNOSIS — Z98.49 CATARACT EXTRACTION STATUS, UNSPECIFIED EYE: Chronic | ICD-10-CM

## 2023-11-26 LAB
ALBUMIN SERPL ELPH-MCNC: 4.4 G/DL — SIGNIFICANT CHANGE UP (ref 3.3–5.2)
ALBUMIN SERPL ELPH-MCNC: 4.4 G/DL — SIGNIFICANT CHANGE UP (ref 3.3–5.2)
ALP SERPL-CCNC: 71 U/L — SIGNIFICANT CHANGE UP (ref 40–120)
ALP SERPL-CCNC: 71 U/L — SIGNIFICANT CHANGE UP (ref 40–120)
ALT FLD-CCNC: 12 U/L — SIGNIFICANT CHANGE UP
ALT FLD-CCNC: 12 U/L — SIGNIFICANT CHANGE UP
ANION GAP SERPL CALC-SCNC: 15 MMOL/L — SIGNIFICANT CHANGE UP (ref 5–17)
ANION GAP SERPL CALC-SCNC: 15 MMOL/L — SIGNIFICANT CHANGE UP (ref 5–17)
APTT BLD: 29.7 SEC — SIGNIFICANT CHANGE UP (ref 24.5–35.6)
APTT BLD: 29.7 SEC — SIGNIFICANT CHANGE UP (ref 24.5–35.6)
AST SERPL-CCNC: 19 U/L — SIGNIFICANT CHANGE UP
AST SERPL-CCNC: 19 U/L — SIGNIFICANT CHANGE UP
BASOPHILS # BLD AUTO: 0.03 K/UL — SIGNIFICANT CHANGE UP (ref 0–0.2)
BASOPHILS # BLD AUTO: 0.03 K/UL — SIGNIFICANT CHANGE UP (ref 0–0.2)
BASOPHILS NFR BLD AUTO: 0.3 % — SIGNIFICANT CHANGE UP (ref 0–2)
BASOPHILS NFR BLD AUTO: 0.3 % — SIGNIFICANT CHANGE UP (ref 0–2)
BILIRUB SERPL-MCNC: 0.4 MG/DL — SIGNIFICANT CHANGE UP (ref 0.4–2)
BILIRUB SERPL-MCNC: 0.4 MG/DL — SIGNIFICANT CHANGE UP (ref 0.4–2)
BLD GP AB SCN SERPL QL: SIGNIFICANT CHANGE UP
BLD GP AB SCN SERPL QL: SIGNIFICANT CHANGE UP
BUN SERPL-MCNC: 16.2 MG/DL — SIGNIFICANT CHANGE UP (ref 8–20)
BUN SERPL-MCNC: 16.2 MG/DL — SIGNIFICANT CHANGE UP (ref 8–20)
CALCIUM SERPL-MCNC: 9.2 MG/DL — SIGNIFICANT CHANGE UP (ref 8.4–10.5)
CALCIUM SERPL-MCNC: 9.2 MG/DL — SIGNIFICANT CHANGE UP (ref 8.4–10.5)
CHLORIDE SERPL-SCNC: 100 MMOL/L — SIGNIFICANT CHANGE UP (ref 96–108)
CHLORIDE SERPL-SCNC: 100 MMOL/L — SIGNIFICANT CHANGE UP (ref 96–108)
CO2 SERPL-SCNC: 24 MMOL/L — SIGNIFICANT CHANGE UP (ref 22–29)
CO2 SERPL-SCNC: 24 MMOL/L — SIGNIFICANT CHANGE UP (ref 22–29)
CREAT SERPL-MCNC: 0.69 MG/DL — SIGNIFICANT CHANGE UP (ref 0.5–1.3)
CREAT SERPL-MCNC: 0.69 MG/DL — SIGNIFICANT CHANGE UP (ref 0.5–1.3)
EGFR: 86 ML/MIN/1.73M2 — SIGNIFICANT CHANGE UP
EGFR: 86 ML/MIN/1.73M2 — SIGNIFICANT CHANGE UP
EOSINOPHIL # BLD AUTO: 0.14 K/UL — SIGNIFICANT CHANGE UP (ref 0–0.5)
EOSINOPHIL # BLD AUTO: 0.14 K/UL — SIGNIFICANT CHANGE UP (ref 0–0.5)
EOSINOPHIL NFR BLD AUTO: 1.6 % — SIGNIFICANT CHANGE UP (ref 0–6)
EOSINOPHIL NFR BLD AUTO: 1.6 % — SIGNIFICANT CHANGE UP (ref 0–6)
GLUCOSE SERPL-MCNC: 111 MG/DL — HIGH (ref 70–99)
GLUCOSE SERPL-MCNC: 111 MG/DL — HIGH (ref 70–99)
HCT VFR BLD CALC: 40.4 % — SIGNIFICANT CHANGE UP (ref 34.5–45)
HCT VFR BLD CALC: 40.4 % — SIGNIFICANT CHANGE UP (ref 34.5–45)
HGB BLD-MCNC: 13.8 G/DL — SIGNIFICANT CHANGE UP (ref 11.5–15.5)
HGB BLD-MCNC: 13.8 G/DL — SIGNIFICANT CHANGE UP (ref 11.5–15.5)
IMM GRANULOCYTES NFR BLD AUTO: 1.3 % — HIGH (ref 0–0.9)
IMM GRANULOCYTES NFR BLD AUTO: 1.3 % — HIGH (ref 0–0.9)
INR BLD: 0.99 RATIO — SIGNIFICANT CHANGE UP (ref 0.85–1.18)
INR BLD: 0.99 RATIO — SIGNIFICANT CHANGE UP (ref 0.85–1.18)
LYMPHOCYTES # BLD AUTO: 1.3 K/UL — SIGNIFICANT CHANGE UP (ref 1–3.3)
LYMPHOCYTES # BLD AUTO: 1.3 K/UL — SIGNIFICANT CHANGE UP (ref 1–3.3)
LYMPHOCYTES # BLD AUTO: 14.5 % — SIGNIFICANT CHANGE UP (ref 13–44)
LYMPHOCYTES # BLD AUTO: 14.5 % — SIGNIFICANT CHANGE UP (ref 13–44)
MCHC RBC-ENTMCNC: 27.3 PG — SIGNIFICANT CHANGE UP (ref 27–34)
MCHC RBC-ENTMCNC: 27.3 PG — SIGNIFICANT CHANGE UP (ref 27–34)
MCHC RBC-ENTMCNC: 34.2 GM/DL — SIGNIFICANT CHANGE UP (ref 32–36)
MCHC RBC-ENTMCNC: 34.2 GM/DL — SIGNIFICANT CHANGE UP (ref 32–36)
MCV RBC AUTO: 80 FL — SIGNIFICANT CHANGE UP (ref 80–100)
MCV RBC AUTO: 80 FL — SIGNIFICANT CHANGE UP (ref 80–100)
MONOCYTES # BLD AUTO: 0.48 K/UL — SIGNIFICANT CHANGE UP (ref 0–0.9)
MONOCYTES # BLD AUTO: 0.48 K/UL — SIGNIFICANT CHANGE UP (ref 0–0.9)
MONOCYTES NFR BLD AUTO: 5.4 % — SIGNIFICANT CHANGE UP (ref 2–14)
MONOCYTES NFR BLD AUTO: 5.4 % — SIGNIFICANT CHANGE UP (ref 2–14)
NEUTROPHILS # BLD AUTO: 6.9 K/UL — SIGNIFICANT CHANGE UP (ref 1.8–7.4)
NEUTROPHILS # BLD AUTO: 6.9 K/UL — SIGNIFICANT CHANGE UP (ref 1.8–7.4)
NEUTROPHILS NFR BLD AUTO: 76.9 % — SIGNIFICANT CHANGE UP (ref 43–77)
NEUTROPHILS NFR BLD AUTO: 76.9 % — SIGNIFICANT CHANGE UP (ref 43–77)
PLATELET # BLD AUTO: 240 K/UL — SIGNIFICANT CHANGE UP (ref 150–400)
PLATELET # BLD AUTO: 240 K/UL — SIGNIFICANT CHANGE UP (ref 150–400)
POTASSIUM SERPL-MCNC: 3.9 MMOL/L — SIGNIFICANT CHANGE UP (ref 3.5–5.3)
POTASSIUM SERPL-MCNC: 3.9 MMOL/L — SIGNIFICANT CHANGE UP (ref 3.5–5.3)
POTASSIUM SERPL-SCNC: 3.9 MMOL/L — SIGNIFICANT CHANGE UP (ref 3.5–5.3)
POTASSIUM SERPL-SCNC: 3.9 MMOL/L — SIGNIFICANT CHANGE UP (ref 3.5–5.3)
PROT SERPL-MCNC: 7.6 G/DL — SIGNIFICANT CHANGE UP (ref 6.6–8.7)
PROT SERPL-MCNC: 7.6 G/DL — SIGNIFICANT CHANGE UP (ref 6.6–8.7)
PROTHROM AB SERPL-ACNC: 11 SEC — SIGNIFICANT CHANGE UP (ref 9.5–13)
PROTHROM AB SERPL-ACNC: 11 SEC — SIGNIFICANT CHANGE UP (ref 9.5–13)
RBC # BLD: 5.05 M/UL — SIGNIFICANT CHANGE UP (ref 3.8–5.2)
RBC # BLD: 5.05 M/UL — SIGNIFICANT CHANGE UP (ref 3.8–5.2)
RBC # FLD: 14 % — SIGNIFICANT CHANGE UP (ref 10.3–14.5)
RBC # FLD: 14 % — SIGNIFICANT CHANGE UP (ref 10.3–14.5)
SODIUM SERPL-SCNC: 139 MMOL/L — SIGNIFICANT CHANGE UP (ref 135–145)
SODIUM SERPL-SCNC: 139 MMOL/L — SIGNIFICANT CHANGE UP (ref 135–145)
WBC # BLD: 8.97 K/UL — SIGNIFICANT CHANGE UP (ref 3.8–10.5)
WBC # BLD: 8.97 K/UL — SIGNIFICANT CHANGE UP (ref 3.8–10.5)
WBC # FLD AUTO: 8.97 K/UL — SIGNIFICANT CHANGE UP (ref 3.8–10.5)
WBC # FLD AUTO: 8.97 K/UL — SIGNIFICANT CHANGE UP (ref 3.8–10.5)

## 2023-11-26 PROCEDURE — 73502 X-RAY EXAM HIP UNI 2-3 VIEWS: CPT | Mod: 26,RT

## 2023-11-26 PROCEDURE — 71045 X-RAY EXAM CHEST 1 VIEW: CPT | Mod: 26

## 2023-11-26 PROCEDURE — 99285 EMERGENCY DEPT VISIT HI MDM: CPT | Mod: GC

## 2023-11-26 PROCEDURE — 93010 ELECTROCARDIOGRAM REPORT: CPT

## 2023-11-26 PROCEDURE — 72192 CT PELVIS W/O DYE: CPT | Mod: 26

## 2023-11-26 PROCEDURE — 73552 X-RAY EXAM OF FEMUR 2/>: CPT | Mod: 26,RT

## 2023-11-26 DEVICE — STEM FEM ACTIS TAPR STD COLLAR SZ 4: Type: IMPLANTABLE DEVICE | Site: RIGHT | Status: FUNCTIONAL

## 2023-11-26 DEVICE — IMPLANTABLE DEVICE: Type: IMPLANTABLE DEVICE | Site: RIGHT | Status: FUNCTIONAL

## 2023-11-26 DEVICE — FEM HEAD 28MM  PLUS  1.5: Type: IMPLANTABLE DEVICE | Site: RIGHT | Status: FUNCTIONAL

## 2023-11-26 RX ORDER — SODIUM CHLORIDE 9 MG/ML
1000 INJECTION INTRAMUSCULAR; INTRAVENOUS; SUBCUTANEOUS
Refills: 0 | Status: DISCONTINUED | OUTPATIENT
Start: 2023-11-26 | End: 2023-11-27

## 2023-11-26 RX ORDER — FENTANYL CITRATE 50 UG/ML
25 INJECTION INTRAVENOUS ONCE
Refills: 0 | Status: DISCONTINUED | OUTPATIENT
Start: 2023-11-26 | End: 2023-11-26

## 2023-11-26 RX ORDER — TRAMADOL HYDROCHLORIDE 50 MG/1
100 TABLET ORAL EVERY 6 HOURS
Refills: 0 | Status: DISCONTINUED | OUTPATIENT
Start: 2023-11-26 | End: 2023-11-30

## 2023-11-26 RX ORDER — SODIUM CHLORIDE 9 MG/ML
500 INJECTION INTRAMUSCULAR; INTRAVENOUS; SUBCUTANEOUS ONCE
Refills: 0 | Status: COMPLETED | OUTPATIENT
Start: 2023-11-26 | End: 2023-11-26

## 2023-11-26 RX ORDER — CEFAZOLIN SODIUM 1 G
2000 VIAL (EA) INJECTION ONCE
Refills: 0 | Status: DISCONTINUED | OUTPATIENT
Start: 2023-11-26 | End: 2023-11-30

## 2023-11-26 RX ORDER — OXYCODONE HYDROCHLORIDE 5 MG/1
10 TABLET ORAL
Refills: 0 | Status: DISCONTINUED | OUTPATIENT
Start: 2023-11-26 | End: 2023-11-27

## 2023-11-26 RX ORDER — POVIDONE-IODINE 5 %
1 AEROSOL (ML) TOPICAL ONCE
Refills: 0 | Status: DISCONTINUED | OUTPATIENT
Start: 2023-11-26 | End: 2023-11-30

## 2023-11-26 RX ORDER — TRAMADOL HYDROCHLORIDE 50 MG/1
50 TABLET ORAL EVERY 4 HOURS
Refills: 0 | Status: DISCONTINUED | OUTPATIENT
Start: 2023-11-26 | End: 2023-11-30

## 2023-11-26 RX ORDER — VANCOMYCIN HCL 1 G
1000 VIAL (EA) INTRAVENOUS ONCE
Refills: 0 | Status: DISCONTINUED | OUTPATIENT
Start: 2023-11-26 | End: 2023-11-30

## 2023-11-26 RX ORDER — MUPIROCIN 20 MG/G
1 OINTMENT TOPICAL
Refills: 0 | Status: DISCONTINUED | OUTPATIENT
Start: 2023-11-26 | End: 2023-11-30

## 2023-11-26 RX ORDER — MORPHINE SULFATE 50 MG/1
4 CAPSULE, EXTENDED RELEASE ORAL ONCE
Refills: 0 | Status: DISCONTINUED | OUTPATIENT
Start: 2023-11-26 | End: 2023-11-26

## 2023-11-26 RX ORDER — CHLORHEXIDINE GLUCONATE 213 G/1000ML
1 SOLUTION TOPICAL EVERY 12 HOURS
Refills: 0 | Status: COMPLETED | OUTPATIENT
Start: 2023-11-26 | End: 2023-11-27

## 2023-11-26 RX ORDER — DIAZEPAM 5 MG
5 TABLET ORAL ONCE
Refills: 0 | Status: DISCONTINUED | OUTPATIENT
Start: 2023-11-26 | End: 2023-11-26

## 2023-11-26 RX ORDER — FENTANYL CITRATE 50 UG/ML
50 INJECTION INTRAVENOUS ONCE
Refills: 0 | Status: DISCONTINUED | OUTPATIENT
Start: 2023-11-26 | End: 2023-11-26

## 2023-11-26 RX ORDER — ACETAMINOPHEN 500 MG
1000 TABLET ORAL ONCE
Refills: 0 | Status: COMPLETED | OUTPATIENT
Start: 2023-11-26 | End: 2023-11-26

## 2023-11-26 RX ORDER — OXYCODONE HYDROCHLORIDE 5 MG/1
5 TABLET ORAL
Refills: 0 | Status: DISCONTINUED | OUTPATIENT
Start: 2023-11-26 | End: 2023-11-27

## 2023-11-26 RX ORDER — HYDROMORPHONE HYDROCHLORIDE 2 MG/ML
0.5 INJECTION INTRAMUSCULAR; INTRAVENOUS; SUBCUTANEOUS ONCE
Refills: 0 | Status: DISCONTINUED | OUTPATIENT
Start: 2023-11-26 | End: 2023-11-26

## 2023-11-26 RX ADMIN — MUPIROCIN 1 APPLICATION(S): 20 OINTMENT TOPICAL at 17:55

## 2023-11-26 RX ADMIN — Medication 400 MILLIGRAM(S): at 10:50

## 2023-11-26 RX ADMIN — HYDROMORPHONE HYDROCHLORIDE 0.5 MILLIGRAM(S): 2 INJECTION INTRAMUSCULAR; INTRAVENOUS; SUBCUTANEOUS at 22:43

## 2023-11-26 RX ADMIN — FENTANYL CITRATE 50 MICROGRAM(S): 50 INJECTION INTRAVENOUS at 10:50

## 2023-11-26 RX ADMIN — CHLORHEXIDINE GLUCONATE 1 APPLICATION(S): 213 SOLUTION TOPICAL at 17:59

## 2023-11-26 RX ADMIN — HYDROMORPHONE HYDROCHLORIDE 0.5 MILLIGRAM(S): 2 INJECTION INTRAMUSCULAR; INTRAVENOUS; SUBCUTANEOUS at 23:43

## 2023-11-26 RX ADMIN — FENTANYL CITRATE 25 MICROGRAM(S): 50 INJECTION INTRAVENOUS at 10:42

## 2023-11-26 RX ADMIN — OXYCODONE HYDROCHLORIDE 5 MILLIGRAM(S): 5 TABLET ORAL at 20:30

## 2023-11-26 RX ADMIN — Medication 400 MILLIGRAM(S): at 17:49

## 2023-11-26 RX ADMIN — FENTANYL CITRATE 50 MICROGRAM(S): 50 INJECTION INTRAVENOUS at 11:30

## 2023-11-26 RX ADMIN — SODIUM CHLORIDE 500 MILLILITER(S): 9 INJECTION INTRAMUSCULAR; INTRAVENOUS; SUBCUTANEOUS at 15:39

## 2023-11-26 RX ADMIN — FENTANYL CITRATE 25 MICROGRAM(S): 50 INJECTION INTRAVENOUS at 10:12

## 2023-11-26 RX ADMIN — SODIUM CHLORIDE 75 MILLILITER(S): 9 INJECTION INTRAMUSCULAR; INTRAVENOUS; SUBCUTANEOUS at 15:39

## 2023-11-26 RX ADMIN — MORPHINE SULFATE 4 MILLIGRAM(S): 50 CAPSULE, EXTENDED RELEASE ORAL at 13:32

## 2023-11-26 RX ADMIN — TRAMADOL HYDROCHLORIDE 100 MILLIGRAM(S): 50 TABLET ORAL at 17:52

## 2023-11-26 RX ADMIN — Medication 1000 MILLIGRAM(S): at 11:30

## 2023-11-26 NOTE — ED PROVIDER NOTE - NS ED ROS FT
Const: Denies fever, chills  HEENT: Denies blurry vision, sore throat  Neck: Denies neck pain/stiffness  Resp: Denies coughing, SOB  Cardiovascular: Denies CP, palpitations, LE edema  GI: Denies nausea, vomiting, abdominal pain, diarrhea, constipation, blood in stool  : Denies urinary frequency/urgency/dysuria, hematuria  MSK:  + Right hip pain. Denies back pain  Neuro: Denies HA, dizziness, numbness, weakness  Skin: Denies rashes.

## 2023-11-26 NOTE — H&P ADULT - HISTORY OF PRESENT ILLNESS
83-year-old female with PMHx hashimoto thyroiditis, no other PMHx presents for right hip injury after mechanical fall in her kitchen while wearing slippers.  Patient landed on her right hip, obvious deformity noted.  She denies head trauma, LOC.  She takes no blood thinners, only takes probiotic medication.  She notes multiple allergies to medications including NSAIDs and codeine which causes her to have swelling.  She denies anaphylaxis.  She denies numbness or discoloration of the extremity.  Denies any other orthopedic complaints.

## 2023-11-26 NOTE — ED ADULT TRIAGE NOTE - CHIEF COMPLAINT QUOTE
Pt BIBA c/o right hip and thigh pain s/p mechanical slip and fall on slippers in the kitchen.  Negative head trauma, loc or blood thinners.  Right leg shortened and rotated.  + pedal pulse to RLE

## 2023-11-26 NOTE — H&P ADULT - NSHPPHYSICALEXAM_GEN_ALL_CORE
PHYSICAL EXAM:      Appearance: Alert, responsive, in no acute distress.    Neurological: Sensation is grossly intact to light touch. No focal deficits or weaknesses found.    Skin: no rash on visible skin. Skin is clean, dry and intact. No bleeding. No abrasions. No ulcerations.    Vascular: 2+ distal pulses. Cap refill < 2 sec. No signs of venous insufficiency or stasis. No extremity ulcerations. No cyanosis.    Musculoskeletal:         Left Upper Extremity: Clavicle: NTTP. Shoulder: NTTP, FROM. Abduction/adduction/flexion/extension intact. Elbow: NTTP, FROM. EE/EF intact. Wrist: NTTP, FROM. WE/WF intact. Hand: NTTP throughout, FROM. Abduction/adduction/flexion/extension of digits 1-5 intact. Compartments soft compressible. Sensation intact to light touch.        Right Upper Extremity: Clavicle: NTTP. Shoulder: NTTP, FROM. Abduction/adduction/flexion/extension intact. Elbow: NTTP, FROM. EE/EF intact. Wrist: NTTP, FROM. WE/WF intact. Hand: NTTP throughout, FROM. Abduction/adduction/flexion/extension of digits 1-5 intact. Compartments soft compressible. Sensation intact to light touch.        Left Lower Extremity: Hip: NTTP, FROM. HF/HE intact. Knee: NTTP, FROM. KE/KF intact. Ankle: NTTP, FROM. DF/PF/EHL/FHL intact. Compartments soft compressible. Sensation intact to light touch.        Right Lower Extremity: Hip: TTP at groin, ROM limited due to pain. Positive log roll.  Knee shortened and externally rotated. Knee: NTTP, Knee ROM limited due to pain at hip. Ankle: NTTP, FROM. DF/PF/EHL/FHL intact. Compartments soft compressible. Sensation intact to light touch.

## 2023-11-26 NOTE — H&P ADULT - ASSESSMENT
PLAN:   * NPO for OR today   * IV fluids ordered and to start once NPO  * Pre-operative ABX ordered  *Routine daily anticoagulation held for OR  * Single dose anticoaguation ordered  * Medical clearance requested for procedure  * Bed rest PLAN:   * NPO for OR today   * IV fluids ordered and to start once NPO  * Pre-operative ABX ordered  * Anesthesia consulted   * Bed rest  * Case discussed with Dr Saenz

## 2023-11-26 NOTE — ED PROVIDER NOTE - PHYSICAL EXAMINATION
Const: Awake, alert and oriented.   In painful distress. Well appearing.  HEENT: NC/AT. Moist mucous membranes.  Eyes: No scleral icterus. EOMI.  Neck:. Soft and supple. Full ROM without pain.  Cardiac: Regular rate and regular rhythm. +S1/S2. No murmurs. Peripheral pulses 2+ and symmetric. No LE edema.  Cap refill bilateral digits less than 2 seconds.  Resp: Speaking in full sentences. No evidence of respiratory distress. No wheezes, rales or rhonchi.  Abd: Soft, non-tender, non-distended. Normal bowel sounds in all 4 quadrants. No guarding or rebound.  Back: Spine midline and non-tender. No CVAT.    MSK: Right hip shortened and externally rotated.  Long bones intact.  Skin: No rashes, abrasions or lacerations.  Neuro: Awake, alert & oriented x 3. Moves all extremities symmetrically.

## 2023-11-26 NOTE — H&P ADULT - NSHPLABSRESULTS_GEN_ALL_CORE
ACC: [id number] EXAM:  XR HIP WITH PELV 2-3V RT   ORDERED BY: MANISH SIDHU     ACC: [id number] EXAM:  XR FEMUR 2 VIEWS RT   ORDERED BY: MANISH SIDHU     PROCEDURE DATE:  11/26/2023          INTERPRETATION:  History: Fall.    FINDINGS:    Frontal pelvis  Frontal and crosstable lateral right hip  Obliqued frontal and crosstable lateral right femur:    Fracture base of the right femoral neck with foreshortening displacement.    Distal femur intact.    Impression:    Fracture base of the right femoral neck with foreshortening displacement.    --- End of Report ---            ANN HEWITT MD; Attending Radiologist  This document has been electronically signed. Nov 26 2023 12:42PM

## 2023-11-26 NOTE — ED PROVIDER NOTE - ATTENDING CONTRIBUTION TO CARE
I, Vianca Hackett, performed the initial face to face bedside interview with this patient regarding history of present illness, review of symptoms and relevant past medical, social and family history.  I completed an independent physical examination.  I was the initial provider who evaluated this patient. I have signed out the follow up of any pending tests (i.e. labs, radiological studies) to the resident.  I have communicated the patient’s plan of care and disposition with the resident.

## 2023-11-26 NOTE — ED ADULT NURSE NOTE - NSFALLHARMRISKINTERV_ED_ALL_ED
Assistance OOB with selected safe patient handling equipment if applicable/Assistance with ambulation/Communicate risk of Fall with Harm to all staff, patient, and family/Monitor gait and stability/Provide visual cue: red socks, yellow wristband, yellow gown, etc/Reinforce activity limits and safety measures with patient and family/Bed in lowest position, wheels locked, appropriate side rails in place/Call bell, personal items and telephone in reach/Instruct patient to call for assistance before getting out of bed/chair/stretcher/Non-slip footwear applied when patient is off stretcher/[place] to call system/Physically safe environment - no spills, clutter or unnecessary equipment/Purposeful Proactive Rounding/Room/bathroom lighting operational, light cord in reach

## 2023-11-26 NOTE — H&P ADULT - NS ATTEND AMEND GEN_ALL_CORE FT
Await further imaging to determine pending surgical intervention.  Patient to continue NPO and bedrest.  Await optimization for OR.

## 2023-11-26 NOTE — ED ADULT NURSE REASSESSMENT NOTE - NS ED NURSE REASSESS COMMENT FT1
Pt is A&OX4, pt is being transferred to CDU Bed Ztent 1 and report given to Judi HERR after CT scan, pts current condition, medical history and reason for admission reviewed, IV site is clean/dry/intact, Pt is non-ambulatory, pt is not in any pain or distress at this time, pt is aware of the transfer, the need for the transfer and acclimated to the new unit, provided the call button, personal items within reach, bed is in lowest position, wheels are locked, pt VS recorded and placed in the EMR, pt education deemed successful after teach back shows proficiency.

## 2023-11-26 NOTE — ED PROVIDER NOTE - PROGRESS NOTE DETAILS
JK - xrays pending. Pain improving however still in significant pain and distress. Fentanyl, ofirmev ordered. Will continue to monitor. JK - will admit to ortho service for further monitoring. Currently stable, pain controlled. VSS.

## 2023-11-26 NOTE — ED PROVIDER NOTE - OBJECTIVE STATEMENT
83-year-old female presents for right hip injury after mechanical fall in her kitchen while wearing slippers.  Patient landed on her right hip, obvious deformity noted.  She denies head trauma, LOC.  She takes no blood thinners, only takes probiotic medication.  She notes multiple allergies to medications including NSAIDs and codeine which causes her to have swelling.  She denies anaphylaxis.  She denies numbness or discoloration of the extremity.  No other injuries. 83-year-old female with PMHx hashimoto thyroiditis, no other PMHx presents for right hip injury after mechanical fall in her kitchen while wearing slippers.  Patient landed on her right hip, obvious deformity noted.  She denies head trauma, LOC.  She takes no blood thinners, only takes probiotic medication.  She notes multiple allergies to medications including NSAIDs and codeine which causes her to have swelling.  She denies anaphylaxis.  She denies numbness or discoloration of the extremity.  No other injuries.

## 2023-11-26 NOTE — ED ADULT NURSE NOTE - OBJECTIVE STATEMENT
Pt AOx4, pt presents s/p fall while making breakfast pt states "I slipped over my slippers". Daughter Amy at bedside. Pt safety maintained.

## 2023-11-26 NOTE — ED PROVIDER NOTE - CLINICAL SUMMARY MEDICAL DECISION MAKING FREE TEXT BOX
83-year-old female presents for mechanical fall, obvious right hip fracture.  Will obtain x-rays, pain control, ortho consult.

## 2023-11-27 LAB
A1C WITH ESTIMATED AVERAGE GLUCOSE RESULT: 5 % — SIGNIFICANT CHANGE UP (ref 4–5.6)
A1C WITH ESTIMATED AVERAGE GLUCOSE RESULT: 5 % — SIGNIFICANT CHANGE UP (ref 4–5.6)
ANION GAP SERPL CALC-SCNC: 10 MMOL/L — SIGNIFICANT CHANGE UP (ref 5–17)
ANION GAP SERPL CALC-SCNC: 10 MMOL/L — SIGNIFICANT CHANGE UP (ref 5–17)
BUN SERPL-MCNC: 12 MG/DL — SIGNIFICANT CHANGE UP (ref 8–20)
BUN SERPL-MCNC: 12 MG/DL — SIGNIFICANT CHANGE UP (ref 8–20)
CALCIUM SERPL-MCNC: 8.3 MG/DL — LOW (ref 8.4–10.5)
CALCIUM SERPL-MCNC: 8.3 MG/DL — LOW (ref 8.4–10.5)
CHLORIDE SERPL-SCNC: 97 MMOL/L — SIGNIFICANT CHANGE UP (ref 96–108)
CHLORIDE SERPL-SCNC: 97 MMOL/L — SIGNIFICANT CHANGE UP (ref 96–108)
CO2 SERPL-SCNC: 24 MMOL/L — SIGNIFICANT CHANGE UP (ref 22–29)
CO2 SERPL-SCNC: 24 MMOL/L — SIGNIFICANT CHANGE UP (ref 22–29)
CREAT SERPL-MCNC: 0.62 MG/DL — SIGNIFICANT CHANGE UP (ref 0.5–1.3)
CREAT SERPL-MCNC: 0.62 MG/DL — SIGNIFICANT CHANGE UP (ref 0.5–1.3)
EGFR: 88 ML/MIN/1.73M2 — SIGNIFICANT CHANGE UP
EGFR: 88 ML/MIN/1.73M2 — SIGNIFICANT CHANGE UP
ESTIMATED AVERAGE GLUCOSE: 97 MG/DL — SIGNIFICANT CHANGE UP (ref 68–114)
ESTIMATED AVERAGE GLUCOSE: 97 MG/DL — SIGNIFICANT CHANGE UP (ref 68–114)
GLUCOSE SERPL-MCNC: 156 MG/DL — HIGH (ref 70–99)
GLUCOSE SERPL-MCNC: 156 MG/DL — HIGH (ref 70–99)
HCT VFR BLD CALC: 37.8 % — SIGNIFICANT CHANGE UP (ref 34.5–45)
HCT VFR BLD CALC: 37.8 % — SIGNIFICANT CHANGE UP (ref 34.5–45)
HGB BLD-MCNC: 12.6 G/DL — SIGNIFICANT CHANGE UP (ref 11.5–15.5)
HGB BLD-MCNC: 12.6 G/DL — SIGNIFICANT CHANGE UP (ref 11.5–15.5)
MCHC RBC-ENTMCNC: 26.6 PG — LOW (ref 27–34)
MCHC RBC-ENTMCNC: 26.6 PG — LOW (ref 27–34)
MCHC RBC-ENTMCNC: 33.3 GM/DL — SIGNIFICANT CHANGE UP (ref 32–36)
MCHC RBC-ENTMCNC: 33.3 GM/DL — SIGNIFICANT CHANGE UP (ref 32–36)
MCV RBC AUTO: 79.9 FL — LOW (ref 80–100)
MCV RBC AUTO: 79.9 FL — LOW (ref 80–100)
MRSA PCR RESULT.: SIGNIFICANT CHANGE UP
MRSA PCR RESULT.: SIGNIFICANT CHANGE UP
PLATELET # BLD AUTO: 193 K/UL — SIGNIFICANT CHANGE UP (ref 150–400)
PLATELET # BLD AUTO: 193 K/UL — SIGNIFICANT CHANGE UP (ref 150–400)
POTASSIUM SERPL-MCNC: 3.8 MMOL/L — SIGNIFICANT CHANGE UP (ref 3.5–5.3)
POTASSIUM SERPL-MCNC: 3.8 MMOL/L — SIGNIFICANT CHANGE UP (ref 3.5–5.3)
POTASSIUM SERPL-SCNC: 3.8 MMOL/L — SIGNIFICANT CHANGE UP (ref 3.5–5.3)
POTASSIUM SERPL-SCNC: 3.8 MMOL/L — SIGNIFICANT CHANGE UP (ref 3.5–5.3)
RBC # BLD: 4.73 M/UL — SIGNIFICANT CHANGE UP (ref 3.8–5.2)
RBC # BLD: 4.73 M/UL — SIGNIFICANT CHANGE UP (ref 3.8–5.2)
RBC # FLD: 13.7 % — SIGNIFICANT CHANGE UP (ref 10.3–14.5)
RBC # FLD: 13.7 % — SIGNIFICANT CHANGE UP (ref 10.3–14.5)
S AUREUS DNA NOSE QL NAA+PROBE: SIGNIFICANT CHANGE UP
S AUREUS DNA NOSE QL NAA+PROBE: SIGNIFICANT CHANGE UP
SODIUM SERPL-SCNC: 131 MMOL/L — LOW (ref 135–145)
SODIUM SERPL-SCNC: 131 MMOL/L — LOW (ref 135–145)
WBC # BLD: 14.2 K/UL — HIGH (ref 3.8–10.5)
WBC # BLD: 14.2 K/UL — HIGH (ref 3.8–10.5)
WBC # FLD AUTO: 14.2 K/UL — HIGH (ref 3.8–10.5)
WBC # FLD AUTO: 14.2 K/UL — HIGH (ref 3.8–10.5)

## 2023-11-27 PROCEDURE — 99223 1ST HOSP IP/OBS HIGH 75: CPT

## 2023-11-27 PROCEDURE — 73502 X-RAY EXAM HIP UNI 2-3 VIEWS: CPT | Mod: 26

## 2023-11-27 RX ORDER — MAGNESIUM HYDROXIDE 400 MG/1
30 TABLET, CHEWABLE ORAL DAILY
Refills: 0 | Status: DISCONTINUED | OUTPATIENT
Start: 2023-11-27 | End: 2023-11-30

## 2023-11-27 RX ORDER — CEFAZOLIN SODIUM 1 G
2000 VIAL (EA) INJECTION
Refills: 0 | Status: COMPLETED | OUTPATIENT
Start: 2023-11-27 | End: 2023-11-27

## 2023-11-27 RX ORDER — FENTANYL CITRATE 50 UG/ML
50 INJECTION INTRAVENOUS
Refills: 0 | Status: DISCONTINUED | OUTPATIENT
Start: 2023-11-27 | End: 2023-11-27

## 2023-11-27 RX ORDER — SODIUM CHLORIDE 9 MG/ML
1000 INJECTION, SOLUTION INTRAVENOUS
Refills: 0 | Status: DISCONTINUED | OUTPATIENT
Start: 2023-11-27 | End: 2023-11-27

## 2023-11-27 RX ORDER — POLYETHYLENE GLYCOL 3350 17 G/17G
17 POWDER, FOR SOLUTION ORAL AT BEDTIME
Refills: 0 | Status: DISCONTINUED | OUTPATIENT
Start: 2023-11-27 | End: 2023-11-30

## 2023-11-27 RX ORDER — SENNA PLUS 8.6 MG/1
2 TABLET ORAL AT BEDTIME
Refills: 0 | Status: DISCONTINUED | OUTPATIENT
Start: 2023-11-27 | End: 2023-11-30

## 2023-11-27 RX ORDER — ONDANSETRON 8 MG/1
4 TABLET, FILM COATED ORAL EVERY 6 HOURS
Refills: 0 | Status: DISCONTINUED | OUTPATIENT
Start: 2023-11-27 | End: 2023-11-30

## 2023-11-27 RX ORDER — OXYCODONE HYDROCHLORIDE 5 MG/1
10 TABLET ORAL
Refills: 0 | Status: DISCONTINUED | OUTPATIENT
Start: 2023-11-27 | End: 2023-11-27

## 2023-11-27 RX ORDER — ENOXAPARIN SODIUM 100 MG/ML
40 INJECTION SUBCUTANEOUS EVERY 24 HOURS
Refills: 0 | Status: DISCONTINUED | OUTPATIENT
Start: 2023-11-28 | End: 2023-11-30

## 2023-11-27 RX ORDER — TRAMADOL HYDROCHLORIDE 50 MG/1
50 TABLET ORAL EVERY 6 HOURS
Refills: 0 | Status: DISCONTINUED | OUTPATIENT
Start: 2023-11-27 | End: 2023-11-30

## 2023-11-27 RX ORDER — ACETAMINOPHEN 500 MG
975 TABLET ORAL EVERY 8 HOURS
Refills: 0 | Status: DISCONTINUED | OUTPATIENT
Start: 2023-11-27 | End: 2023-11-30

## 2023-11-27 RX ORDER — FENTANYL CITRATE 50 UG/ML
25 INJECTION INTRAVENOUS
Refills: 0 | Status: DISCONTINUED | OUTPATIENT
Start: 2023-11-27 | End: 2023-11-27

## 2023-11-27 RX ORDER — CEFAZOLIN SODIUM 1 G
2000 VIAL (EA) INJECTION EVERY 8 HOURS
Refills: 0 | Status: DISCONTINUED | OUTPATIENT
Start: 2023-11-27 | End: 2023-11-27

## 2023-11-27 RX ORDER — OXYCODONE HYDROCHLORIDE 5 MG/1
5 TABLET ORAL
Refills: 0 | Status: DISCONTINUED | OUTPATIENT
Start: 2023-11-27 | End: 2023-11-27

## 2023-11-27 RX ORDER — INFLUENZA VIRUS VACCINE 15; 15; 15; 15 UG/.5ML; UG/.5ML; UG/.5ML; UG/.5ML
0.7 SUSPENSION INTRAMUSCULAR ONCE
Refills: 0 | Status: DISCONTINUED | OUTPATIENT
Start: 2023-11-27 | End: 2023-11-30

## 2023-11-27 RX ORDER — SODIUM CHLORIDE 9 MG/ML
1000 INJECTION INTRAMUSCULAR; INTRAVENOUS; SUBCUTANEOUS
Refills: 0 | Status: DISCONTINUED | OUTPATIENT
Start: 2023-11-27 | End: 2023-11-30

## 2023-11-27 RX ORDER — HYDROMORPHONE HYDROCHLORIDE 2 MG/ML
0.5 INJECTION INTRAMUSCULAR; INTRAVENOUS; SUBCUTANEOUS
Refills: 0 | Status: DISCONTINUED | OUTPATIENT
Start: 2023-11-27 | End: 2023-11-30

## 2023-11-27 RX ADMIN — CHLORHEXIDINE GLUCONATE 1 APPLICATION(S): 213 SOLUTION TOPICAL at 05:24

## 2023-11-27 RX ADMIN — FENTANYL CITRATE 25 MICROGRAM(S): 50 INJECTION INTRAVENOUS at 02:14

## 2023-11-27 RX ADMIN — Medication 975 MILLIGRAM(S): at 22:50

## 2023-11-27 RX ADMIN — ONDANSETRON 4 MILLIGRAM(S): 8 TABLET, FILM COATED ORAL at 08:33

## 2023-11-27 RX ADMIN — FENTANYL CITRATE 25 MICROGRAM(S): 50 INJECTION INTRAVENOUS at 02:00

## 2023-11-27 RX ADMIN — TRAMADOL HYDROCHLORIDE 100 MILLIGRAM(S): 50 TABLET ORAL at 15:01

## 2023-11-27 RX ADMIN — Medication 2000 MILLIGRAM(S): at 08:13

## 2023-11-27 RX ADMIN — ONDANSETRON 4 MILLIGRAM(S): 8 TABLET, FILM COATED ORAL at 17:17

## 2023-11-27 RX ADMIN — TRAMADOL HYDROCHLORIDE 100 MILLIGRAM(S): 50 TABLET ORAL at 16:01

## 2023-11-27 RX ADMIN — MUPIROCIN 1 APPLICATION(S): 20 OINTMENT TOPICAL at 18:52

## 2023-11-27 RX ADMIN — Medication 2000 MILLIGRAM(S): at 17:55

## 2023-11-27 RX ADMIN — FENTANYL CITRATE 25 MICROGRAM(S): 50 INJECTION INTRAVENOUS at 02:30

## 2023-11-27 RX ADMIN — TRAMADOL HYDROCHLORIDE 100 MILLIGRAM(S): 50 TABLET ORAL at 09:37

## 2023-11-27 RX ADMIN — TRAMADOL HYDROCHLORIDE 100 MILLIGRAM(S): 50 TABLET ORAL at 08:37

## 2023-11-27 RX ADMIN — Medication 975 MILLIGRAM(S): at 21:59

## 2023-11-27 RX ADMIN — MUPIROCIN 1 APPLICATION(S): 20 OINTMENT TOPICAL at 05:24

## 2023-11-27 NOTE — CONSULT NOTE ADULT - SUBJECTIVE AND OBJECTIVE BOX
Patient is a 83y old  Female who is s/p R hip hemiarthroplasty POPD#1. C/O RLE discomfort due to immobilizer, felt nauseated and vomited after iv Abx given.   Denies any sob/chest pain , voiding .     CC: R hip p[ain after mechanical fall , pain well controlled postop , c/o discomfort due to immobilizer, felt nauseated and   vomited after  iv abx given       HPI:  83-year-old female with PMHx Hashimoto's thyroiditis, Vertigo in the past , irregular heart rate , no other PMHx presents for right hip injury after mechanical fall in her kitchen while wearing slippers.  Patient landed on her right hip, obvious deformity noted.  She denies head trauma, LOC.  She takes no blood thinners.       PAST MEDICAL & SURGICAL HISTORY:  Vertigo in the past       Phlebitis  right leg  s/p surgery      History of  section      Status post cataract extraction  right eye          Social History:  Tobacco - denies  ETOH - occasionally   Illicit drug abuse - denies    FAMILY HISTORY:  Liver cancer - sister  Father with MI/ CVA at 70's       Allergies    Advil (Swelling)  codeine (Swelling)  Excedrin (Swelling)  mycins (Swelling)  Cipro (Swelling)    Intolerances        HOME MEDICATIONS :   Probiotics     REVIEW OF SYSTEMS:    As above , all other systems are reviewed and are negative     MEDICATIONS  (STANDING):  ceFAZolin  Injectable. 2000 milliGRAM(s) IV Push once  ceFAZolin  Injectable. 2000 milliGRAM(s) IV Push <User Schedule>  influenza  Vaccine (HIGH DOSE) 0.7 milliLiter(s) IntraMuscular once  multivitamin 1 Tablet(s) Oral daily  mupirocin 2% Ointment 1 Application(s) Both Nostrils two times a day  polyethylene glycol 3350 17 Gram(s) Oral at bedtime  povidone iodine 5% Nasal Swab 1 Application(s) Both Nostrils once  senna 2 Tablet(s) Oral at bedtime  sodium chloride 0.9%. 1000 milliLiter(s) (100 mL/Hr) IV Continuous <Continuous>  vancomycin  IVPB 1000 milliGRAM(s) IV Intermittent once    MEDICATIONS  (PRN):  HYDROmorphone  Injectable 0.5 milliGRAM(s) IV Push every 3 hours PRN breakthrough pain  magnesium hydroxide Suspension 30 milliLiter(s) Oral daily PRN Constipation  ondansetron Injectable 4 milliGRAM(s) IV Push every 6 hours PRN Nausea and/or Vomiting  traMADol 50 milliGRAM(s) Oral every 4 hours PRN Moderate Pain (4 - 6)  traMADol 100 milliGRAM(s) Oral every 6 hours PRN Severe Pain (7 - 10)  traMADol 50 milliGRAM(s) Oral every 6 hours PRN Mild Pain (1 - 3)      Vital Signs Last 24 Hrs  T(C): 36.4 (2023 04:20), Max: 36.8 (2023 19:03)  T(F): 97.5 (2023 04:20), Max: 98.3 (2023 19:03)  HR: 77 (2023 04:20) (63 - 98)  BP: 135/74 (2023 04:20) (135/74 - 173/81)  BP(mean): 64 (2023 02:45) (64 - 106)  RR: 18 (2023 04:20) (13 - 19)  SpO2: 92% (2023 04:20) (92% - 97%)    Parameters below as of 2023 04:20  Patient On (Oxygen Delivery Method): nasal cannula  O2 Flow (L/min): 2      PHYSICAL EXAM:    GENERAL: NAD, well-groomed, well-developed  HEAD:  Atraumatic, Normocephalic  EYES: EOMI, PERRLA, conjunctiva and sclera clear  NECK: Supple, No JVD, Normal thyroid  NERVOUS SYSTEM:  Alert & Oriented X 4, no focal deficit   CHEST/LUNG: CTA  b/l,  no rales, rhonchi, wheezing, or rubs  HEART: Regular rate and rhythm; No murmurs, rubs, or gallops  ABDOMEN: Soft, Nontender, Nondistended; Bowel sounds present  EXTREMITIES:  2+ Peripheral Pulses, No clubbing, cyanosis, or edema ,   LYMPH: No lymphadenopathy noted  SKIN: No rashes or lesions    LABS:                        12.6   14.20 )-----------( 193      ( 2023 01:52 )             37.8     11    131<L>  |  97  |  12.0  ----------------------------<  156<H>  3.8   |  24.0  |  0.62    Ca    8.3<L>      2023 01:52    TPro  7.6  /  Alb  4.4  /  TBili  0.4  /  DBili  x   /  AST  19  /  ALT  12  /  AlkPhos  71      PT/INR - ( 2023 10:13 )   PT: 11.0 sec;   INR: 0.99 ratio         PTT - ( 2023 10:13 )  PTT:29.7 sec      RADIOLOGY & ADDITIONAL STUDIES:  < from: Xray Hip 2-3 Views Intraoperative, Right (23 @ 01:24) >    ACC: [id number] EXAM:  XR HIP 2-3VRT INTR OP   ORDERED BY: FRANKI MARTINEZ     PROCEDURE DATE:  2023          INTERPRETATION:  Right hip. Single postop study shows a right hip   replacement with good alignment. No bone destruction or fracture.    IMPRESSION: Right hip replacement.    --- End of Report ---    < end of copied text >    < from: CT Pelvis Bony Only No Cont (23 @ 16:03) >    ACC: [id number] EXAM:  CT PELVIS BONY ONLY   ORDERED BY: TIMOTHY ESTRADA     PROCEDURE DATE:  2023          INTERPRETATION:  EXAMINATION: CT of the pelvis without contrast    CLINICAL INFORMATION: Right femur fracture    TECHNIQUE: Axial CT images were obtained through the pelvis. Coronal and   sagittal reformatted images were made. 3-D reconstruction images were   also performed.    FINDINGS: There is an acute displaced right femoral neck fracture which   extends from the subcapital region posteriorly to the mid cervical region   anteriorly. There is superior displacement of the femoral shaft and apex   anterior angulation. There are no other acute fractures. There are no   advanced arthritic changes at the hips. There is lower lumbar spondylosis.    There is lower lumbar spondylosis. There are scattered enthesopathic bony   productive changes. There are vascular calcifications.     The 3-D reconstruction images confirm the above osseous findings    IMPRESSION: Acute displaced right femoral neck fracture, as above.    --- End of Report ---      < end of copied text >    < from: Xray Chest 1 View- PORTABLE-Urgent (Xray Chest 1 View- PORTABLE-Urgent .) (23 @ 11:24) >    ACC: [id number] EXAM:  XR CHEST PORTABLE URGENT 1V   ORDERED BY: MANISH SIDHU     PROCEDURE DATE:  2023          INTERPRETATION:  CLINICAL STATEMENT: Fall. Hip fracture. Preop.    TECHNIQUE: AP view of the chest.      COMPARISON: None    The cardiomediastinal silhouette may be exaggerated by AP technique.   Aortic calcification. Suboptimal degree of inspiration. No focal lung   consolidation or sizable pleural effusion.    No significant osseous abnormality.    IMPRESSION:    Unremarkable frontal chest x ray    --- End of Report ---    < end of copied text >  < from: 12 Lead ECG (23 @ 12:32) >    Ventricular Rate 83 BPM    Atrial Rate 83 BPM    P-R Interval 166 ms    QRS Duration 122 ms    Q-T Interval 448 ms    QTC Calculation(Bazett) 526 ms    P Axis 63 degrees    R Axis 19 degrees    T Axis 32 degrees    Diagnosis Line Sinus rhythm with frequent Premature ventricular complexes  Right bundle branch block  Abnormal ECG    Confirmed by ELI ZARAGOZA ([id number]) on 2023 10:12:40 PM    < end of copied text >    < from: 12 Lead ECG (17 @ 04:13) >    Ventricular Rate 80 BPM    Atrial Rate 80 BPM    P-R Interval 136 ms    QRS Duration 84 ms    Q-T Interval 388 ms    QTC Calculation(Bezet) 447 ms    P Axis -16 degrees    R Axis -17 degrees    T Axis 26 degrees    Diagnosis Line *** Poor data quality, interpretation may be adversely affected  Sinus rhythm with occasional Premature ventricular complexes  Otherwise normal ECG    < end of copied text >

## 2023-11-27 NOTE — PROGRESS NOTE ADULT - SUBJECTIVE AND OBJECTIVE BOX
RODRICK COSTELLO    [id number]    History: Patient is status post right hip hemiarthroplasty on 11/27/23. Pt is POD#0 seen in PACU. Patient is doing well. The patient's pain is controlled using the prescribed pain medications.  Denies nausea, vomiting, chest pain, shortness of breath, abdominal pain or fever. No new complaints.                          12.6   14.20 )-----------( 193      ( 27 Nov 2023 01:52 )             37.8       11-26    139  |  100  |  16.2  ----------------------------<  111<H>  3.9   |  24.0  |  0.69    Ca    9.2      26 Nov 2023 10:13    TPro  7.6  /  Alb  4.4  /  TBili  0.4  /  DBili  x   /  AST  19  /  ALT  12  /  AlkPhos  71  11-26        MEDICATIONS  (STANDING):  ceFAZolin  Injectable. 2000 milliGRAM(s) IV Push once  ceFAZolin  Injectable. 2000 milliGRAM(s) IV Push <User Schedule>  chlorhexidine 2% Cloths 1 Application(s) Topical every 12 hours  influenza  Vaccine (HIGH DOSE) 0.7 milliLiter(s) IntraMuscular once  lactated ringers. 1000 milliLiter(s) (75 mL/Hr) IV Continuous <Continuous>  multivitamin 1 Tablet(s) Oral daily  mupirocin 2% Ointment 1 Application(s) Both Nostrils two times a day  polyethylene glycol 3350 17 Gram(s) Oral at bedtime  povidone iodine 5% Nasal Swab 1 Application(s) Both Nostrils once  senna 2 Tablet(s) Oral at bedtime  sodium chloride 0.9%. 1000 milliLiter(s) (75 mL/Hr) IV Continuous <Continuous>  vancomycin  IVPB 1000 milliGRAM(s) IV Intermittent once    MEDICATIONS  (PRN):  fentaNYL    Injectable 50 MICROGram(s) IV Push every 5 minutes PRN Severe Pain (7 - 10)  fentaNYL    Injectable 25 MICROGram(s) IV Push every 5 minutes PRN Moderate Pain (4 - 6)  HYDROmorphone  Injectable 0.5 milliGRAM(s) IV Push every 3 hours PRN Severe Pain (7 - 10)  magnesium hydroxide Suspension 30 milliLiter(s) Oral daily PRN Constipation  ondansetron Injectable 4 milliGRAM(s) IV Push every 6 hours PRN Nausea and/or Vomiting  traMADol 50 milliGRAM(s) Oral every 6 hours PRN Mild Pain (1 - 3)  traMADol 50 milliGRAM(s) Oral every 4 hours PRN Moderate Pain (4 - 6)  traMADol 100 milliGRAM(s) Oral every 6 hours PRN Severe Pain (7 - 10)      Physical exam: RLE: The right hip dressing is clean, dry and intact. No drainage or discharge. No erythema is noted. No blistering. No ecchymosis. The calf is supple nontender.  No calf tenderness. Sensation to light touch is grossly intact distally on right. ankle DF/PF intact, EHL/FHL intact, No foot drop. 2+ dorsalis pedis pulse. Capillary refill is less than 2 seconds. No cyanosis.    Primary Orthopedic Assessment:  • s/p right hip hemiarthroplasty      Plan:   • DVT prophylaxis as prescribed, including use of compression devices and ankle pumps  •  physical therapy consulted  • Weightbearing as tolerated of the right lower extremity with assistance of a walker  • Incentive spirometry encouraged  • Pain control as clinically indicated  • Anterior hip precautions, KI at all times  • ABX per protocol

## 2023-11-27 NOTE — PHYSICAL THERAPY INITIAL EVALUATION ADULT - NAME OF CLINICIAN
Samara Skyrizi Counseling: I discussed with the patient the risks of risankizumab-rzaa including but not limited to immunosuppression, and serious infections.  The patient understands that monitoring is required including a PPD at baseline and must alert us or the primary physician if symptoms of infection or other concerning signs are noted.

## 2023-11-27 NOTE — PHYSICAL THERAPY INITIAL EVALUATION ADULT - PERTINENT HX OF CURRENT PROBLEM, REHAB EVAL
Pt is 83-year-old female with PMHx Hashimoto's thyroiditis, Vertigo in the past , irregular heart rate , no other PMHx presents for right hip injury after mechanical fall in her kitchen while wearing slippers.  Patient landed on her right hip, obvious deformity noted. Now s/p Right hip hemiarthroplasty 11/27

## 2023-11-27 NOTE — PHYSICAL THERAPY INITIAL EVALUATION ADULT - RANGE OF MOTION EXAMINATION, REHAB EVAL
Right ankle WNL, Right hip WFL within anterior hip precautions/Left LE ROM was WFL (within functional limits)

## 2023-11-27 NOTE — DISCHARGE NOTE PROVIDER - NSDCFUSCHEDAPPT_GEN_ALL_CORE_FT
Tez Chu  [facility]  ORTHOSURG [address]  Scheduled Appointment: 12/14/2023

## 2023-11-27 NOTE — PROGRESS NOTE ADULT - SUBJECTIVE AND OBJECTIVE BOX
History: Patient is status post right hip hemiarthroplasty on 11/27/23. Pt is POD#0. Patient is doing well. The patient c/o pain to right LE, she has not taken any pain medications. Pt states she was vomiting last night but this has resolved  Denies nausea, vomiting, chest pain, shortness of breath, abdominal pain or fever. No new complaints.                          12.6   14.20 )-----------( 193      ( 27 Nov 2023 01:52 )             37.8     11-27    131<L>  |  97  |  12.0  ----------------------------<  156<H>  3.8   |  24.0  |  0.62    Ca    8.3<L>      27 Nov 2023 01:52    TPro  7.6  /  Alb  4.4  /  TBili  0.4  /  DBili  x   /  AST  19  /  ALT  12  /  AlkPhos  71  11-26                Physical exam: RLE: The right hip dressing is clean, dry and intact. No drainage or discharge. No erythema is noted. No blistering. No ecchymosis. The calf is supple nontender.  knee immobilizer in place. No calf tenderness. Sensation to light touch is grossly intact distally on right. ankle DF/PF intact, EHL/FHL intact, No foot drop. 2+ dorsalis pedis pulse. Capillary refill is less than 2 seconds. No cyanosis.    Primary Orthopedic Assessment:  • s/p right hip hemiarthroplasty pod #0      Plan:   • DVT prophylaxis lovenox, including use of compression devices and ankle pumps  •  physical therapy   • Weightbearing as tolerated of the right lower extremity with assistance of a walker  • Incentive spirometry encouraged  • Pain control as clinically indicated  • Anterior hip precautions, KI at all times  • ABX per protocol

## 2023-11-27 NOTE — DISCHARGE NOTE PROVIDER - NSDCMRMEDTOKEN_GEN_ALL_CORE_FT
Antivert: 6 milligram(s) orally once a day   acetaminophen 325 mg oral tablet: 3 tab(s) orally every 8 hours  Antivert: 6 milligram(s) orally once a day  enoxaparin: 40 milligram(s) subcutaneous once a day 40 mg subcutaneous once a day.  magnesium hydroxide 8% oral suspension: 30 milliliter(s) orally once a day As needed Constipation  Multiple Vitamins oral tablet: 1 tab(s) orally once a day  ondansetron 2 mg/mL injectable solution: 4 milligram(s) injectable every 8 hours as needed for  nausea  polyethylene glycol 3350 oral powder for reconstitution: 17 gram(s) orally once a day (at bedtime)  senna leaf extract oral tablet: 2 tab(s) orally once a day (at bedtime)  traMADol 50 mg oral tablet: 1 tab(s) orally every 4 hours As needed Moderate Pain (4 - 6)  traMADol 50 mg oral tablet: 2 tab(s) orally every 6 hours As needed Severe Pain (7 - 10)  traMADol 50 mg oral tablet: 1 tab(s) orally every 6 hours As needed Mild Pain (1 - 3)

## 2023-11-27 NOTE — DISCHARGE NOTE PROVIDER - NSDCFUADDINST_GEN_ALL_CORE_FT
The patient will be seen in the office between 2-3 weeks for wound check.    PLEASE CONTACT OFFICE TO CONFIRM THE APPOINTMENT DATE. Sutures/Staples/Tape will be removed at that time.  **  The silver based dressing is to be removed 7 days from the date of surgery.   ** CONTACT THE OFFICE IF THE FOLLOWING DEVELOP:  - the dressing becomes soiled or saturated  - you develop a fever greater that 101F  - the wound becomes red or you develop blistering around the wound  * Patient may shower after post-op day #3.   * The patient will continue home PT consistent with anterior total hip replacement protocol and will continue to practice anterior total hip precautions for a minimum of 6 week. Transition to outpatient PT will occur at the time of the first office visit.   ** PT should use knee immobilizer at all times until follow up visit   * The patient is FULL weight bearing.  *** The patient will continue LOVENOX 40mg once daily for 4 weeks   * The patient will take OXYCODONE AND TYLENOL for pain control and adjust according to prescription and patient needs. Contact the office if pain increases while taking prescribed pain medications or related concerns develop.   * Celebrex will be taken twice daily for 3 weeks for pain control and prevention of excessive bone growth. Additional prescription may be requested at your office follow-up visit.  * The patient will take Senna S while taking oxycodone to prevent narcotic associated constipation.  Additionally, increase water intake (drink at least 8 glasses of water daily) and try adding fiber to the diet by eating fruits, vegetables and foods that are rich in grains. If constipation is experienced, contact the medical/primary care provider to discuss further treatment options.  * To avoid injury at home:  - continue use of rolling walker until cleared by physical therapist  - have family or friend remove all throw rug or objects in hallways that may present a trip hazard.  - if you experience any dizziness or medical concerns, call your medical doctor or  911.  * The implant may activate metal detection devices.   The patient will be seen in the office between 2-3 weeks for wound check.    PLEASE CONTACT OFFICE TO CONFIRM THE APPOINTMENT DATE. Sutures/Staples/Tape will be removed at that time.  **  The silver based dressing is to be removed 7 days from the date of surgery.   ** CONTACT THE OFFICE IF THE FOLLOWING DEVELOP:  - the dressing becomes soiled or saturated  - you develop a fever greater that 101F  - the wound becomes red or you develop blistering around the wound  * Patient may shower after post-op day #3.   * The patient will continue home PT consistent with anterior total hip replacement protocol and will continue to practice anterior total hip precautions for a minimum of 6 week. Transition to outpatient PT will occur at the time of the first office visit.   ** PT should use knee immobilizer at all times until follow up visit   * The patient is FULL weight bearing.  *** The patient will continue LOVENOX 40mg once daily for 4 weeks   * The patient will take OXYCODONE AND TYLENOL for pain control and adjust according to prescription and patient needs. Contact the office if pain increases while taking prescribed pain medications or related concerns develop.   * The patient will take Senna S while taking oxycodone to prevent narcotic associated constipation.  Additionally, increase water intake (drink at least 8 glasses of water daily) and try adding fiber to the diet by eating fruits, vegetables and foods that are rich in grains. If constipation is experienced, contact the medical/primary care provider to discuss further treatment options.  * To avoid injury at home:  - continue use of rolling walker until cleared by physical therapist  - have family or friend remove all throw rug or objects in hallways that may present a trip hazard.  - if you experience any dizziness or medical concerns, call your medical doctor or  911.  * The implant may activate metal detection devices.   The patient will be seen in the office between 2-3 weeks for wound check.    PLEASE CONTACT OFFICE TO CONFIRM THE APPOINTMENT DATE. Sutures/Staples/Tape will be removed at that time.  **  The silver based dressing is to be removed 7 days from the date of surgery.   ** CONTACT THE OFFICE IF THE FOLLOWING DEVELOP:  - the dressing becomes soiled or saturated  - you develop a fever greater that 101F  - the wound becomes red or you develop blistering around the wound  * Patient may shower after post-op day #3.   * The patient will continue home PT consistent with anterior total hip replacement protocol and will continue to practice anterior total hip precautions for a minimum of 6 week. Transition to outpatient PT will occur at the time of the first office visit.   ** PT should use knee immobilizer at all times until follow up visit   * The patient is FULL weight bearing.  *** The patient will continue LOVENOX 40mg once daily for 4 weeks   * The patient will take Tramadol and TYLENOL for pain control and adjust according to prescription and patient needs. Contact the office if pain increases while taking prescribed pain medications or related concerns develop.   * The patient will take Senna S while taking oxycodone to prevent narcotic associated constipation.  Additionally, increase water intake (drink at least 8 glasses of water daily) and try adding fiber to the diet by eating fruits, vegetables and foods that are rich in grains. If constipation is experienced, contact the medical/primary care provider to discuss further treatment options.  * To avoid injury at home:  - continue use of rolling walker until cleared by physical therapist  - have family or friend remove all throw rug or objects in hallways that may present a trip hazard.  - if you experience any dizziness or medical concerns, call your medical doctor or  911.  * The implant may activate metal detection devices.   The patient will be seen in the office between 2-3 weeks for wound check.    PLEASE CONTACT OFFICE TO CONFIRM THE APPOINTMENT DATE. Sutures/Staples/Tape will be removed at that time.  **  Keep dressing clean, dry, and intact. Replace with a similar dry dressing if soiled/saturated.   ** CONTACT THE OFFICE IF THE FOLLOWING DEVELOP:  - the dressing becomes soiled or saturated  - you develop a fever greater that 101F  - the wound becomes red or you develop blistering around the wound  * Patient may shower after post-op day #3.   * The patient will continue home PT consistent with hip hemiarthroplasty protocol and will continue to practice posterior hip precautions for a minimum of 6 week. Transition to outpatient PT will occur at the time of the first office visit.   ** Patient should use knee immobilizer at all times until follow up visit.  * The patient is FULL weight bearing.  *** The patient will continue LOVENOX 40mg once daily for 4 weeks.  * The patient will take Tramadol and TYLENOL for pain control and adjust according to prescription and patient needs. Contact the office if pain increases while taking prescribed pain medications or related concerns develop.   * The patient will take Senna S while taking Tramadol to prevent narcotic associated constipation.  Additionally, increase water intake (drink at least 8 glasses of water daily) and try adding fiber to the diet by eating fruits, vegetables and foods that are rich in grains. If constipation is experienced, contact the medical/primary care provider to discuss further treatment options.  * To avoid injury at home:  - continue use of rolling walker until cleared by physical therapist  - have family or friend remove all throw rug or objects in hallways that may present a trip hazard.  - if you experience any dizziness or medical concerns, call your medical doctor or  911.  * The implant may activate metal detection devices.

## 2023-11-27 NOTE — PATIENT PROFILE ADULT - FALL HARM RISK - HARM RISK INTERVENTIONS

## 2023-11-27 NOTE — PHYSICAL THERAPY INITIAL EVALUATION ADULT - ADDITIONAL COMMENTS
Pt reports independent PTA, owns no DME +, family can assist if needed. Daughter present and supportive

## 2023-11-27 NOTE — CONSULT NOTE ADULT - ASSESSMENT
83-year-old female with PMHx Hashimoto's thyroiditis, Vertigo in the past , irregular heart rate , no other PMHx presents for right hip injury after mechanical fall in her kitchen while wearing slippers.  Patient landed on her right hip, obvious deformity noted.  She denies head trauma, LOC.  She takes no blood thinners. Now s/p R hip hemiarthroplasty .     1. R  Acute displaced right femoral neck fracture after mechanical fall , no LOC ,   S/P R hip hemiarthroplasty ,   PT/OT/pain mgmt  DVT prophylaxis- as per ortho  Abx as per SCIP  Incentive spirometry  Prophylaxis of opioid  induced constipation.  Wound care / WB status / Immobilizer.     2. Mechanical fall - PT/OT, fall precautions     3. Postop leucocytosis - no S/S of  infection , likely reactive     4. Hyponatremia - likely due to episodes of vomiting , poor oral intake ,   will continue ivf , Zofran , follow electrolytes in am     5. Nausea and vomiting - not present prior arrival to the hospital .   [place] nauseate d in ED after Morphine  and Cefazolin given , today felt nauseated and vomited after Cefazolin given ,   no abd pain , had BM yesterday . Most likely nausea , episodes of vomiting due to Morphine / cefazolin .   Has one more dose of Cefazolin at 4 pm . Nurse told to give Zofran prior iv Abx . Will observe .     6. DVT prophylaxis  - as per ortho protocol  Opioid induced constipation  prophylaxis - bowel regimen     Thank you for the courtesy of this consult ,   will follow along with you .  83-year-old female with PMHx Hashimoto's thyroiditis, Vertigo in the past , irregular heart rate , no other PMHx presents for right hip injury after mechanical fall in her kitchen while wearing slippers.  Patient landed on her right hip, obvious deformity noted.  She denies head trauma, LOC.  She takes no blood thinners. Now s/p R hip hemiarthroplasty .     1. R  Acute displaced right femoral neck fracture after mechanical fall , no LOC ,   S/P R hip hemiarthroplasty ,   PT/OT/pain mgmt  DVT prophylaxis- as per ortho  Abx as per SCIP  Incentive spirometry  Prophylaxis of opioid  induced constipation.  Wound care / WB status / Immobilizer.     2. Mechanical fall - PT/OT, fall precautions     3. Postop leucocytosis - no S/S of  infection , likely reactive     4. Hyponatremia - likely due to episodes of vomiting , poor oral intake ,   will continue ivf , Zofran , follow electrolytes in am     5. Nausea and vomiting - not present prior arrival to the hospital .   [place] nauseate d in ED after Morphine  and Cefazolin given , today felt nauseated and vomited after Cefazolin given ,   no abd pain , had BM yesterday . Most likely nausea , episodes of vomiting due to Morphine / cefazolin .   Has one more dose of Cefazolin at 4 pm . Nurse told to give Zofran prior iv Abx . Will observe .     6. DVT prophylaxis  - as per ortho protocol  Opioid induced constipation  prophylaxis - bowel regimen     7. Noted with hypoxia while asleep , this am on O2 2 L, patient admits that she snores ,   presumed Sleep Apnea , will recommend Sleep Study as on outpatient .   Check O2 sat on RA while ambulating .     8. Hx of irregular heart rate , EKG noted abnormal  with PVC , RBBB , patient states she does follow up with cardiologist .     Thank you for the courtesy of this consult ,   will follow along with you .

## 2023-11-27 NOTE — DISCHARGE NOTE PROVIDER - CARE PROVIDER_API CALL
Hina Saenz  Orthopaedic Surgery  [address] [place] [address]  Phone: [phone]  Fax: [phone]  Follow Up Time:

## 2023-11-27 NOTE — DISCHARGE NOTE PROVIDER - HOSPITAL COURSE
The patient underwent a Right HIP HEMIARTHROPLASTY on 11/27/23. The patient received antibiotics consistent with SCIP guidelines. The patient received medical cleared pre-operatively and underwent the procedure and had no intra-operative complications. Post-operatively, the patient was seen by medicine and PT. The patient received LOVENOX for DVTP. The patient received pain medications per orthopedic pain management protocol and the pain was appropriately controlled. Patient was instructed on anterior total hip precautions by PT. The patient did not have any post-operative medical complications. The patient was discharged in stable condition.     The patient underwent a Right HIP HEMIARTHROPLASTY on 11/27/23. The patient received antibiotics consistent with SCIP guidelines. The patient received medical cleared pre-operatively and underwent the procedure and had no intra-operative complications. Post-operatively, the patient was seen by medicine and PT. The patient received LOVENOX for DVTP. The patient received pain medications per orthopedic pain management protocol and the pain was appropriately controlled. Patient was instructed on posterior hip precautions by PT. The patient did not have any post-operative medical complications. The patient was discharged in stable condition.

## 2023-11-28 LAB
ANION GAP SERPL CALC-SCNC: 11 MMOL/L — SIGNIFICANT CHANGE UP (ref 5–17)
ANION GAP SERPL CALC-SCNC: 11 MMOL/L — SIGNIFICANT CHANGE UP (ref 5–17)
ANION GAP SERPL CALC-SCNC: 12 MMOL/L — SIGNIFICANT CHANGE UP (ref 5–17)
ANION GAP SERPL CALC-SCNC: 12 MMOL/L — SIGNIFICANT CHANGE UP (ref 5–17)
ANION GAP SERPL CALC-SCNC: 9 MMOL/L — SIGNIFICANT CHANGE UP (ref 5–17)
ANION GAP SERPL CALC-SCNC: 9 MMOL/L — SIGNIFICANT CHANGE UP (ref 5–17)
BUN SERPL-MCNC: 12.6 MG/DL — SIGNIFICANT CHANGE UP (ref 8–20)
BUN SERPL-MCNC: 12.6 MG/DL — SIGNIFICANT CHANGE UP (ref 8–20)
BUN SERPL-MCNC: 13.8 MG/DL — SIGNIFICANT CHANGE UP (ref 8–20)
BUN SERPL-MCNC: 13.8 MG/DL — SIGNIFICANT CHANGE UP (ref 8–20)
BUN SERPL-MCNC: 16 MG/DL — SIGNIFICANT CHANGE UP (ref 8–20)
BUN SERPL-MCNC: 16 MG/DL — SIGNIFICANT CHANGE UP (ref 8–20)
CALCIUM SERPL-MCNC: 8.2 MG/DL — LOW (ref 8.4–10.5)
CALCIUM SERPL-MCNC: 8.2 MG/DL — LOW (ref 8.4–10.5)
CALCIUM SERPL-MCNC: 8.3 MG/DL — LOW (ref 8.4–10.5)
CALCIUM SERPL-MCNC: 8.3 MG/DL — LOW (ref 8.4–10.5)
CALCIUM SERPL-MCNC: 8.9 MG/DL — SIGNIFICANT CHANGE UP (ref 8.4–10.5)
CALCIUM SERPL-MCNC: 8.9 MG/DL — SIGNIFICANT CHANGE UP (ref 8.4–10.5)
CHLORIDE SERPL-SCNC: 101 MMOL/L — SIGNIFICANT CHANGE UP (ref 96–108)
CHLORIDE SERPL-SCNC: 101 MMOL/L — SIGNIFICANT CHANGE UP (ref 96–108)
CHLORIDE SERPL-SCNC: 92 MMOL/L — LOW (ref 96–108)
CHLORIDE SERPL-SCNC: 92 MMOL/L — LOW (ref 96–108)
CHLORIDE SERPL-SCNC: 98 MMOL/L — SIGNIFICANT CHANGE UP (ref 96–108)
CHLORIDE SERPL-SCNC: 98 MMOL/L — SIGNIFICANT CHANGE UP (ref 96–108)
CO2 SERPL-SCNC: 24 MMOL/L — SIGNIFICANT CHANGE UP (ref 22–29)
CO2 SERPL-SCNC: 24 MMOL/L — SIGNIFICANT CHANGE UP (ref 22–29)
CO2 SERPL-SCNC: 25 MMOL/L — SIGNIFICANT CHANGE UP (ref 22–29)
CO2 SERPL-SCNC: 25 MMOL/L — SIGNIFICANT CHANGE UP (ref 22–29)
CO2 SERPL-SCNC: 28 MMOL/L — SIGNIFICANT CHANGE UP (ref 22–29)
CO2 SERPL-SCNC: 28 MMOL/L — SIGNIFICANT CHANGE UP (ref 22–29)
CREAT SERPL-MCNC: 0.57 MG/DL — SIGNIFICANT CHANGE UP (ref 0.5–1.3)
CREAT SERPL-MCNC: 0.57 MG/DL — SIGNIFICANT CHANGE UP (ref 0.5–1.3)
CREAT SERPL-MCNC: 0.79 MG/DL — SIGNIFICANT CHANGE UP (ref 0.5–1.3)
CREAT SERPL-MCNC: 0.79 MG/DL — SIGNIFICANT CHANGE UP (ref 0.5–1.3)
CREAT SERPL-MCNC: 0.88 MG/DL — SIGNIFICANT CHANGE UP (ref 0.5–1.3)
CREAT SERPL-MCNC: 0.88 MG/DL — SIGNIFICANT CHANGE UP (ref 0.5–1.3)
EGFR: 65 ML/MIN/1.73M2 — SIGNIFICANT CHANGE UP
EGFR: 65 ML/MIN/1.73M2 — SIGNIFICANT CHANGE UP
EGFR: 74 ML/MIN/1.73M2 — SIGNIFICANT CHANGE UP
EGFR: 74 ML/MIN/1.73M2 — SIGNIFICANT CHANGE UP
EGFR: 90 ML/MIN/1.73M2 — SIGNIFICANT CHANGE UP
EGFR: 90 ML/MIN/1.73M2 — SIGNIFICANT CHANGE UP
GLUCOSE SERPL-MCNC: 104 MG/DL — HIGH (ref 70–99)
GLUCOSE SERPL-MCNC: 104 MG/DL — HIGH (ref 70–99)
GLUCOSE SERPL-MCNC: 109 MG/DL — HIGH (ref 70–99)
GLUCOSE SERPL-MCNC: 109 MG/DL — HIGH (ref 70–99)
GLUCOSE SERPL-MCNC: 96 MG/DL — SIGNIFICANT CHANGE UP (ref 70–99)
GLUCOSE SERPL-MCNC: 96 MG/DL — SIGNIFICANT CHANGE UP (ref 70–99)
HCT VFR BLD CALC: 34.3 % — LOW (ref 34.5–45)
HCT VFR BLD CALC: 34.3 % — LOW (ref 34.5–45)
HCT VFR BLD CALC: 34.4 % — LOW (ref 34.5–45)
HCT VFR BLD CALC: 34.4 % — LOW (ref 34.5–45)
HCT VFR BLD CALC: 36 % — SIGNIFICANT CHANGE UP (ref 34.5–45)
HCT VFR BLD CALC: 36 % — SIGNIFICANT CHANGE UP (ref 34.5–45)
HGB BLD-MCNC: 11.2 G/DL — LOW (ref 11.5–15.5)
HGB BLD-MCNC: 11.2 G/DL — LOW (ref 11.5–15.5)
HGB BLD-MCNC: 11.5 G/DL — SIGNIFICANT CHANGE UP (ref 11.5–15.5)
HGB BLD-MCNC: 11.5 G/DL — SIGNIFICANT CHANGE UP (ref 11.5–15.5)
HGB BLD-MCNC: 11.6 G/DL — SIGNIFICANT CHANGE UP (ref 11.5–15.5)
HGB BLD-MCNC: 11.6 G/DL — SIGNIFICANT CHANGE UP (ref 11.5–15.5)
MAGNESIUM SERPL-MCNC: 1.7 MG/DL — LOW (ref 1.8–2.6)
MAGNESIUM SERPL-MCNC: 1.7 MG/DL — LOW (ref 1.8–2.6)
MCHC RBC-ENTMCNC: 25.5 PG — LOW (ref 27–34)
MCHC RBC-ENTMCNC: 25.5 PG — LOW (ref 27–34)
MCHC RBC-ENTMCNC: 26.7 PG — LOW (ref 27–34)
MCHC RBC-ENTMCNC: 26.7 PG — LOW (ref 27–34)
MCHC RBC-ENTMCNC: 26.8 PG — LOW (ref 27–34)
MCHC RBC-ENTMCNC: 26.8 PG — LOW (ref 27–34)
MCHC RBC-ENTMCNC: 31.9 GM/DL — LOW (ref 32–36)
MCHC RBC-ENTMCNC: 31.9 GM/DL — LOW (ref 32–36)
MCHC RBC-ENTMCNC: 32.6 GM/DL — SIGNIFICANT CHANGE UP (ref 32–36)
MCHC RBC-ENTMCNC: 32.6 GM/DL — SIGNIFICANT CHANGE UP (ref 32–36)
MCHC RBC-ENTMCNC: 33.8 GM/DL — SIGNIFICANT CHANGE UP (ref 32–36)
MCHC RBC-ENTMCNC: 33.8 GM/DL — SIGNIFICANT CHANGE UP (ref 32–36)
MCV RBC AUTO: 79.2 FL — LOW (ref 80–100)
MCV RBC AUTO: 79.2 FL — LOW (ref 80–100)
MCV RBC AUTO: 79.8 FL — LOW (ref 80–100)
MCV RBC AUTO: 79.8 FL — LOW (ref 80–100)
MCV RBC AUTO: 82.1 FL — SIGNIFICANT CHANGE UP (ref 80–100)
MCV RBC AUTO: 82.1 FL — SIGNIFICANT CHANGE UP (ref 80–100)
NT-PROBNP SERPL-SCNC: 1650 PG/ML — HIGH (ref 0–300)
NT-PROBNP SERPL-SCNC: 1650 PG/ML — HIGH (ref 0–300)
NT-PROBNP SERPL-SCNC: 64 PG/ML — SIGNIFICANT CHANGE UP (ref 0–300)
NT-PROBNP SERPL-SCNC: 64 PG/ML — SIGNIFICANT CHANGE UP (ref 0–300)
PHOSPHATE SERPL-MCNC: 2.4 MG/DL — SIGNIFICANT CHANGE UP (ref 2.4–4.7)
PHOSPHATE SERPL-MCNC: 2.4 MG/DL — SIGNIFICANT CHANGE UP (ref 2.4–4.7)
PLATELET # BLD AUTO: 172 K/UL — SIGNIFICANT CHANGE UP (ref 150–400)
PLATELET # BLD AUTO: 172 K/UL — SIGNIFICANT CHANGE UP (ref 150–400)
PLATELET # BLD AUTO: 178 K/UL — SIGNIFICANT CHANGE UP (ref 150–400)
PLATELET # BLD AUTO: 178 K/UL — SIGNIFICANT CHANGE UP (ref 150–400)
PLATELET # BLD AUTO: 340 K/UL — SIGNIFICANT CHANGE UP (ref 150–400)
PLATELET # BLD AUTO: 340 K/UL — SIGNIFICANT CHANGE UP (ref 150–400)
POTASSIUM SERPL-MCNC: 3.7 MMOL/L — SIGNIFICANT CHANGE UP (ref 3.5–5.3)
POTASSIUM SERPL-MCNC: 3.7 MMOL/L — SIGNIFICANT CHANGE UP (ref 3.5–5.3)
POTASSIUM SERPL-MCNC: 4 MMOL/L — SIGNIFICANT CHANGE UP (ref 3.5–5.3)
POTASSIUM SERPL-MCNC: 4 MMOL/L — SIGNIFICANT CHANGE UP (ref 3.5–5.3)
POTASSIUM SERPL-MCNC: 4.2 MMOL/L — SIGNIFICANT CHANGE UP (ref 3.5–5.3)
POTASSIUM SERPL-MCNC: 4.2 MMOL/L — SIGNIFICANT CHANGE UP (ref 3.5–5.3)
POTASSIUM SERPL-SCNC: 3.7 MMOL/L — SIGNIFICANT CHANGE UP (ref 3.5–5.3)
POTASSIUM SERPL-SCNC: 3.7 MMOL/L — SIGNIFICANT CHANGE UP (ref 3.5–5.3)
POTASSIUM SERPL-SCNC: 4 MMOL/L — SIGNIFICANT CHANGE UP (ref 3.5–5.3)
POTASSIUM SERPL-SCNC: 4 MMOL/L — SIGNIFICANT CHANGE UP (ref 3.5–5.3)
POTASSIUM SERPL-SCNC: 4.2 MMOL/L — SIGNIFICANT CHANGE UP (ref 3.5–5.3)
POTASSIUM SERPL-SCNC: 4.2 MMOL/L — SIGNIFICANT CHANGE UP (ref 3.5–5.3)
RBC # BLD: 4.19 M/UL — SIGNIFICANT CHANGE UP (ref 3.8–5.2)
RBC # BLD: 4.19 M/UL — SIGNIFICANT CHANGE UP (ref 3.8–5.2)
RBC # BLD: 4.33 M/UL — SIGNIFICANT CHANGE UP (ref 3.8–5.2)
RBC # BLD: 4.33 M/UL — SIGNIFICANT CHANGE UP (ref 3.8–5.2)
RBC # BLD: 4.51 M/UL — SIGNIFICANT CHANGE UP (ref 3.8–5.2)
RBC # BLD: 4.51 M/UL — SIGNIFICANT CHANGE UP (ref 3.8–5.2)
RBC # FLD: 13.5 % — SIGNIFICANT CHANGE UP (ref 10.3–14.5)
RBC # FLD: 13.5 % — SIGNIFICANT CHANGE UP (ref 10.3–14.5)
RBC # FLD: 13.6 % — SIGNIFICANT CHANGE UP (ref 10.3–14.5)
RBC # FLD: 13.6 % — SIGNIFICANT CHANGE UP (ref 10.3–14.5)
RBC # FLD: 14.1 % — SIGNIFICANT CHANGE UP (ref 10.3–14.5)
RBC # FLD: 14.1 % — SIGNIFICANT CHANGE UP (ref 10.3–14.5)
SODIUM SERPL-SCNC: 129 MMOL/L — LOW (ref 135–145)
SODIUM SERPL-SCNC: 129 MMOL/L — LOW (ref 135–145)
SODIUM SERPL-SCNC: 133 MMOL/L — LOW (ref 135–145)
SODIUM SERPL-SCNC: 133 MMOL/L — LOW (ref 135–145)
SODIUM SERPL-SCNC: 138 MMOL/L — SIGNIFICANT CHANGE UP (ref 135–145)
SODIUM SERPL-SCNC: 138 MMOL/L — SIGNIFICANT CHANGE UP (ref 135–145)
TROPONIN T, HIGH SENSITIVITY RESULT: 28 NG/L — SIGNIFICANT CHANGE UP (ref 0–51)
TROPONIN T, HIGH SENSITIVITY RESULT: 28 NG/L — SIGNIFICANT CHANGE UP (ref 0–51)
TROPONIN T, HIGH SENSITIVITY RESULT: 8 NG/L — SIGNIFICANT CHANGE UP (ref 0–51)
TROPONIN T, HIGH SENSITIVITY RESULT: 8 NG/L — SIGNIFICANT CHANGE UP (ref 0–51)
WBC # BLD: 10.49 K/UL — SIGNIFICANT CHANGE UP (ref 3.8–10.5)
WBC # BLD: 10.49 K/UL — SIGNIFICANT CHANGE UP (ref 3.8–10.5)
WBC # BLD: 12.18 K/UL — HIGH (ref 3.8–10.5)
WBC # BLD: 12.18 K/UL — HIGH (ref 3.8–10.5)
WBC # BLD: 9.58 K/UL — SIGNIFICANT CHANGE UP (ref 3.8–10.5)
WBC # BLD: 9.58 K/UL — SIGNIFICANT CHANGE UP (ref 3.8–10.5)
WBC # FLD AUTO: 10.49 K/UL — SIGNIFICANT CHANGE UP (ref 3.8–10.5)
WBC # FLD AUTO: 10.49 K/UL — SIGNIFICANT CHANGE UP (ref 3.8–10.5)
WBC # FLD AUTO: 12.18 K/UL — HIGH (ref 3.8–10.5)
WBC # FLD AUTO: 12.18 K/UL — HIGH (ref 3.8–10.5)
WBC # FLD AUTO: 9.58 K/UL — SIGNIFICANT CHANGE UP (ref 3.8–10.5)
WBC # FLD AUTO: 9.58 K/UL — SIGNIFICANT CHANGE UP (ref 3.8–10.5)

## 2023-11-28 PROCEDURE — 99233 SBSQ HOSP IP/OBS HIGH 50: CPT

## 2023-11-28 PROCEDURE — 71045 X-RAY EXAM CHEST 1 VIEW: CPT | Mod: 26

## 2023-11-28 PROCEDURE — 99222 1ST HOSP IP/OBS MODERATE 55: CPT

## 2023-11-28 PROCEDURE — 93010 ELECTROCARDIOGRAM REPORT: CPT

## 2023-11-28 RX ORDER — ACETAMINOPHEN 500 MG
1000 TABLET ORAL ONCE
Refills: 0 | Status: COMPLETED | OUTPATIENT
Start: 2023-11-28 | End: 2023-11-28

## 2023-11-28 RX ORDER — MAGNESIUM SULFATE 500 MG/ML
2 VIAL (ML) INJECTION ONCE
Refills: 0 | Status: COMPLETED | OUTPATIENT
Start: 2023-11-28 | End: 2023-11-28

## 2023-11-28 RX ORDER — ACETAMINOPHEN 500 MG
1000 TABLET ORAL ONCE
Refills: 0 | Status: DISCONTINUED | OUTPATIENT
Start: 2023-11-28 | End: 2023-11-30

## 2023-11-28 RX ADMIN — MUPIROCIN 1 APPLICATION(S): 20 OINTMENT TOPICAL at 06:16

## 2023-11-28 RX ADMIN — SENNA PLUS 2 TABLET(S): 8.6 TABLET ORAL at 21:44

## 2023-11-28 RX ADMIN — Medication 975 MILLIGRAM(S): at 07:16

## 2023-11-28 RX ADMIN — TRAMADOL HYDROCHLORIDE 100 MILLIGRAM(S): 50 TABLET ORAL at 10:56

## 2023-11-28 RX ADMIN — TRAMADOL HYDROCHLORIDE 100 MILLIGRAM(S): 50 TABLET ORAL at 10:27

## 2023-11-28 RX ADMIN — ENOXAPARIN SODIUM 40 MILLIGRAM(S): 100 INJECTION SUBCUTANEOUS at 06:15

## 2023-11-28 RX ADMIN — Medication 25 GRAM(S): at 09:43

## 2023-11-28 RX ADMIN — MUPIROCIN 1 APPLICATION(S): 20 OINTMENT TOPICAL at 19:18

## 2023-11-28 RX ADMIN — Medication 400 MILLIGRAM(S): at 17:24

## 2023-11-28 RX ADMIN — Medication 975 MILLIGRAM(S): at 06:16

## 2023-11-28 RX ADMIN — POLYETHYLENE GLYCOL 3350 17 GRAM(S): 17 POWDER, FOR SOLUTION ORAL at 21:45

## 2023-11-28 NOTE — PROGRESS NOTE ADULT - SUBJECTIVE AND OBJECTIVE BOX
Received call from medical Dr. Levy, request BNP, CBC, BMP, CXR, EKG urgently for hypoxia-orders placed  Received call from medical Dr. Levy, request BNP, CBC, BMP, CXR, Trops, EKG urgently for hypoxia-orders placed

## 2023-11-28 NOTE — CONSULT NOTE ADULT - SUBJECTIVE AND OBJECTIVE BOX
83yF was admitted on     In ED, GCS=15    Patient is a 83y old  Female who presents with a chief complaint of right hip fracture (2023 09:06)    HPI:  83-year-old female with PMHx hashimoto thyroiditis, irregular HR (PVCs, RBBB) presents for right hip injury after mechanical fall in her kitchen while wearing slippers.  Patient landed on her right hip, obvious deformity noted.  She denies head trauma, LOC.  She takes no blood thinners, only takes probiotic medication.  She notes multiple allergies to medications including NSAIDs and codeine which causes her to have swelling.  She denies anaphylaxis. XR R femur showed a fracture at the base of the right femoral neck with foreshortening displacement, fx confirmed on CT pelvis. Patient was admitted to the Orthopedic service and is s/p R hip hemiarthroplasty on 23. Noted to be hypoxic SpO2 93 on 2LNC on , undergoing workup.           Imaging performed:  CT pelvis  - There is an acute displaced right femoral neck fracture which   extends from the subcapital region posteriorly to the mid cervical region   anteriorly. There is superior displacement of the femoral shaft and apex   anterior angulation. There are no other acute fractures. There are no   advanced arthritic changes at the hips. There is lower lumbar spondylosis.    There is lower lumbar spondylosis. There are scattered enthesopathic bony   productive changes. There are vascular calcifications.    XR R femur  - Right hip. Single postop study shows a right hip   replacement with good alignment. No bone destruction or fracture.      REVIEW OF SYSTEMS  Constitutional - No fever, No fatigue  HEENT - No visual disturbances, No vertigo, No neck pain  Respiratory - No cough, No wheezing, No shortness of breath  Cardiovascular - No chest pain, No palpitations  Gastrointestinal - No abdominal pain, No nausea, No vomiting, No diarrhea, No constipation  Genitourinary - No dysuria, No incontinence  Neurological - No headaches, No memory loss, No loss of strength, No numbness, No tremors  Skin - No itching, No rashes, No lesions   Musculoskeletal - No joint pain, No joint swelling, No muscle pain  Psychiatric - No depression, No anxiety        PAST MEDICAL & SURGICAL HISTORY  Vertigo    Phlebitis    History of  section    Status post cataract extraction        FUNCTIONAL HISTORY  Lives   Independent in ADLs and ambulation PTA    CURRENT FUNCTIONAL STATUS  PT 23  bed mobility - mod A  transfers - mod A   gait - mod A 2 steps at bedside, limited 2/2 pain     OT  bed mobility -   transfers -   LBD -       Social History:  Tobacco - denies  ETOH - occasionally   Illicit drug abuse - denies    FAMILY HISTORY:  Liver cancer - sister  Father with MI/ CVA at 70's         RECENT LABS - Reviewed  CBC Full  -  ( 2023 08:37 )  WBC Count : 12.18 K/uL  RBC Count : 4.51 M/uL  Hemoglobin : 11.5 g/dL  Hematocrit : 36.0 %  Platelet Count - Automated : 340 K/uL  Mean Cell Volume : 79.8 fl  Mean Cell Hemoglobin : 25.5 pg  Mean Cell Hemoglobin Concentration : 31.9 gm/dL  Auto Neutrophil # : x  Auto Lymphocyte # : x  Auto Monocyte # : x  Auto Eosinophil # : x  Auto Basophil # : x  Auto Neutrophil % : x  Auto Lymphocyte % : x  Auto Monocyte % : x  Auto Eosinophil % : x  Auto Basophil % : x        138  |  101  |  13.8  ----------------------------<  96  4.2   |  28.0  |  0.88    Ca    8.9      2023 08:37  Phos  2.4       Mg     1.7     -      Urinalysis Basic - ( 2023 08:37 )    Color: x / Appearance: x / SG: x / pH: x  Gluc: 96 mg/dL / Ketone: x  / Bili: x / Urobili: x   Blood: x / Protein: x / Nitrite: x   Leuk Esterase: x / RBC: x / WBC x   Sq Epi: x / Non Sq Epi: x / Bacteria: x        ALLERGIES  Advil (Swelling)  codeine (Swelling)  Excedrin (Swelling)  mycins (Swelling)  Cipro (Swelling)      MEDICATIONS   acetaminophen     Tablet .. 975 milliGRAM(s) Oral every 8 hours  ceFAZolin  Injectable. 2000 milliGRAM(s) IV Push once  enoxaparin Injectable 40 milliGRAM(s) SubCutaneous every 24 hours  HYDROmorphone  Injectable 0.5 milliGRAM(s) IV Push every 3 hours PRN  influenza  Vaccine (HIGH DOSE) 0.7 milliLiter(s) IntraMuscular once  magnesium hydroxide Suspension 30 milliLiter(s) Oral daily PRN  multivitamin 1 Tablet(s) Oral daily  mupirocin 2% Ointment 1 Application(s) Both Nostrils two times a day  ondansetron Injectable 4 milliGRAM(s) IV Push every 6 hours PRN  polyethylene glycol 3350 17 Gram(s) Oral at bedtime  povidone iodine 5% Nasal Swab 1 Application(s) Both Nostrils once  senna 2 Tablet(s) Oral at bedtime  sodium chloride 0.9%. 1000 milliLiter(s) IV Continuous <Continuous>  traMADol 50 milliGRAM(s) Oral every 4 hours PRN  traMADol 100 milliGRAM(s) Oral every 6 hours PRN  traMADol 50 milliGRAM(s) Oral every 6 hours PRN  vancomycin  IVPB 1000 milliGRAM(s) IV Intermittent once      VITALS  T(C): 36.9 (23 @ 09:40), Max: 37.2 (23 @ 04:11)  HR: 90 (23 @ 09:40) (78 - 97)  BP: 145/54 (23 @ 09:40) (121/73 - 146/71)  RR: 18 (23 @ 09:40) (18 - 18)  SpO2: 93% (23 @ 09:40) (93% - 96%)  Wt(kg): --    ----------------------------------------------------------------------------------------  PHYSICAL EXAM  Constitutional - NAD, Comfortable  HEENT - NCAT, EOMI  Chest - Breathing comfortably, No wheezing  Cardiovascular - S1S2   Abdomen - Soft   Extremities - R hip surgical dressing in place, surrounding ecchymoses, mild ttp.   Neurologic Exam -                    Cognitive - AAO to self, place, date, year, situation     Communication - Fluent, No dysarthria     Cranial Nerves - CN 2-12 intact     Motor -                    LEFT    UE - ShAB 5/5, EF 5/5, EE 5/5, WE 5/5,  5/5                    RIGHT UE - ShAB 5/5, EF 5/5, EE 5/5, WE 5/5,  5/5                    LEFT    LE - HF 5/5, KE 5/5, DF 5/5, PF 5/5                    RIGHT LE - HF 5/5, KE 5/5, DF 5/5, PF 5/5        Sensory - Intact to LT     Reflexes - DTR Intact, No primitive reflexive     Coordination - FTN intact  Psychiatric - Mood stable, Affect WNL  ----------------------------------------------------------------------------------------  ASSESSMENT/PLAN  83yFemale with functional deficits after mechanical fall resulting in R femoral neck fx s/p R hip hemiarthroplasty  R femoral neck fx s/p R hip hemiarthroplasty - RLE in knee immobilizer at all times, WBAT, maintain anterior hip precautions. Pain control as below. post-op abx per ortho   Pain - Tylenol, tramadol 50/100 PRN.   bowel regimen - senna qhs, miralax qhs, MoM PRN, maintain while taking opioids to prevent constipation.   Vomiting - x1 after morphine and abx administration, resolved. Zofran prn prior to abx administration.   DVT PPX - SCDs, lovenox    NOTE INCOMPLETE   83yF was admitted on     In ED, GCS=15    Patient is a 83y old  Female who presents with a chief complaint of right hip fracture (2023 09:06)    HPI:  83-year-old female with PMHx hashimoto thyroiditis, irregular HR (PVCs, RBBB) presents for right hip injury after mechanical fall in her kitchen while wearing slippers.  Patient landed on her right hip, obvious deformity noted.  She denies head trauma, LOC.  She takes no blood thinners, only takes probiotic medication.  She notes multiple allergies to medications including NSAIDs and codeine which causes her to have swelling.  She denies anaphylaxis. XR R femur showed a fracture at the base of the right femoral neck with foreshortening displacement, fx confirmed on CT pelvis. Patient was admitted to the Orthopedic service and is s/p R hip hemiarthroplasty on 23. Noted to be hypoxic SpO2 93 on 2LNC on , undergoing workup.     Patient seen and examined with daughter and son at bedside. Complains of pain at her surgical incision site. Also feels like the proximal portion of her knee immobilizer is too tight and digging into her skin. Denies numbness or tingling of her RLE. Prior to admission, patient was independent in mobility and ADLs. She was active and driving. States had some SOB this morning, attributed to anxiety. Symptoms have now resolved.       Imaging performed:  CT pelvis  - There is an acute displaced right femoral neck fracture which   extends from the subcapital region posteriorly to the mid cervical region   anteriorly. There is superior displacement of the femoral shaft and apex   anterior angulation. There are no other acute fractures. There are no   advanced arthritic changes at the hips. There is lower lumbar spondylosis.    There is lower lumbar spondylosis. There are scattered enthesopathic bony   productive changes. There are vascular calcifications.    XR R femur  - Right hip. Single postop study shows a right hip   replacement with good alignment. No bone destruction or fracture.      REVIEW OF SYSTEMS  Constitutional - No fever, No fatigue  HEENT - No visual disturbances, No vertigo, No neck pain  Respiratory - No cough, No wheezing, No shortness of breath  Cardiovascular - No chest pain, No palpitations  Gastrointestinal - No abdominal pain, No nausea, No vomiting, No diarrhea, No constipation  Genitourinary - No dysuria, No incontinence  Neurological - No headaches, No memory loss, No loss of strength, No numbness, No tremors  Skin - No itching, No rashes, No lesions   Musculoskeletal - +Right hip pain   Psychiatric - No depression, No anxiety        PAST MEDICAL & SURGICAL HISTORY  Vertigo    Phlebitis    History of  section    Status post cataract extraction        FUNCTIONAL HISTORY  Lives in a condo with her , 1 small step to enter. no steps inside.   Independent in ADLs and ambulation PTA. +drives    CURRENT FUNCTIONAL STATUS  PT 23  bed mobility - mod A  transfers - mod A   gait - mod A 2 steps at bedside, limited 2/2 pain     OT   bed mobility - min-mod A  transfers - min A  UBD - min A  LBD - dependent       Social History:  Tobacco - denies  ETOH - occasionally   Illicit drug abuse - denies    FAMILY HISTORY:  Liver cancer - sister  Father with MI/ CVA at 70's         RECENT LABS - Reviewed  CBC Full  -  ( 2023 08:37 )  WBC Count : 12.18 K/uL  RBC Count : 4.51 M/uL  Hemoglobin : 11.5 g/dL  Hematocrit : 36.0 %  Platelet Count - Automated : 340 K/uL  Mean Cell Volume : 79.8 fl  Mean Cell Hemoglobin : 25.5 pg  Mean Cell Hemoglobin Concentration : 31.9 gm/dL  Auto Neutrophil # : x  Auto Lymphocyte # : x  Auto Monocyte # : x  Auto Eosinophil # : x  Auto Basophil # : x  Auto Neutrophil % : x  Auto Lymphocyte % : x  Auto Monocyte % : x  Auto Eosinophil % : x  Auto Basophil % : x        138  |  101  |  13.8  ----------------------------<  96  4.2   |  28.0  |  0.88    Ca    8.9      2023 08:37  Phos  2.4       Mg     1.7           Urinalysis Basic - ( 2023 08:37 )    Color: x / Appearance: x / SG: x / pH: x  Gluc: 96 mg/dL / Ketone: x  / Bili: x / Urobili: x   Blood: x / Protein: x / Nitrite: x   Leuk Esterase: x / RBC: x / WBC x   Sq Epi: x / Non Sq Epi: x / Bacteria: x        ALLERGIES  Advil (Swelling)  codeine (Swelling)  Excedrin (Swelling)  mycins (Swelling)  Cipro (Swelling)      MEDICATIONS   acetaminophen     Tablet .. 975 milliGRAM(s) Oral every 8 hours  ceFAZolin  Injectable. 2000 milliGRAM(s) IV Push once  enoxaparin Injectable 40 milliGRAM(s) SubCutaneous every 24 hours  HYDROmorphone  Injectable 0.5 milliGRAM(s) IV Push every 3 hours PRN  influenza  Vaccine (HIGH DOSE) 0.7 milliLiter(s) IntraMuscular once  magnesium hydroxide Suspension 30 milliLiter(s) Oral daily PRN  multivitamin 1 Tablet(s) Oral daily  mupirocin 2% Ointment 1 Application(s) Both Nostrils two times a day  ondansetron Injectable 4 milliGRAM(s) IV Push every 6 hours PRN  polyethylene glycol 3350 17 Gram(s) Oral at bedtime  povidone iodine 5% Nasal Swab 1 Application(s) Both Nostrils once  senna 2 Tablet(s) Oral at bedtime  sodium chloride 0.9%. 1000 milliLiter(s) IV Continuous <Continuous>  traMADol 50 milliGRAM(s) Oral every 4 hours PRN  traMADol 100 milliGRAM(s) Oral every 6 hours PRN  traMADol 50 milliGRAM(s) Oral every 6 hours PRN  vancomycin  IVPB 1000 milliGRAM(s) IV Intermittent once      VITALS  T(C): 36.9 (23 @ 09:40), Max: 37.2 (23 @ 04:11)  HR: 90 (23 @ 09:40) (78 - 97)  BP: 145/54 (23 @ 09:40) (121/73 - 146/71)  RR: 18 (23 @ 09:40) (18 - 18)  SpO2: 93% (23 @ 09:40) (93% - 96%)  Wt(kg): --    ----------------------------------------------------------------------------------------  PHYSICAL EXAM  Constitutional - NAD, Comfortable  HEENT - NCAT, EOMI  Chest - Breathing comfortably, No wheezing, +2LNC  Cardiovascular - S1S2   Abdomen - Soft   Extremities - R hip surgical dressing in place scant old blood noted on dressing. no active bleeding or drainage, surrounding ecchymoses, mild ttp. R knee immobilizer in place  Neurologic Exam -                    Cognitive - AAO to self, place, date, year, situation     Communication - Fluent, No dysarthria     Cranial Nerves - CN 2-12 intact     Motor -                    LEFT    UE - ShAB 5/5, EF 5/5, EE 5/5, WE 5/5,  5/5                    RIGHT UE - ShAB 5/5, EF 5/5, EE 5/5, WE 5/5,  5/5                    LEFT    LE - HF limited 2/2 pain, KE limited as RLE in KI, DF 5/5, PF 5/5                    RIGHT LE - HF 5/5, KE 5/5, DF 5/5, PF 5/5        Sensory - Intact to LT  Psychiatric - Mood stable, Affect WNL  ----------------------------------------------------------------------------------------  ASSESSMENT/PLAN  83yFemale with functional deficits after mechanical fall resulting in R femoral neck fx s/p R hip hemiarthroplasty  R femoral neck fx s/p R hip hemiarthroplasty - RLE in knee immobilizer at all times. Patient complaining of proximal posterior part of the KI digging into her leg, advise padding to prevent skin breakdown. WBAT, maintain anterior hip precautions. Pain control as below. post-op abx per ortho   Pain - ice, Tylenol, tramadol 50/100 PRN. IV hydromorphone must be dc'd prior to discharge to rehab   Bowel regimen - senna qhs, miralax qhs, MoM PRN, maintain while taking opioids to prevent constipation.   Vomiting - x1 after morphine and abx administration, resolved. Zofran prn prior to abx administration.   SOB - resolved, cont IS, CXR neg   DVT PPX - SCDs, lovenox  Rehab/Impaired mobility and function - Patient continues to require hospitalization for the above diagnoses and ongoing active management of comorbid complications that are substantially impairing functional ability and impairing quality of life necessitating ongoing medical management of these complications necessitating acute rehab.     When medically optimized, based on the patient's diagnosis, current functional status and potential for progress, recommend ACUTE inpatient rehabilitation for the functional deficits consisting of 3 hours of therapy/day & 24 hour RN/daily PMR physician for comorbid medical management. Patient will be able to tolerate 3 hours a day.    Goals for acute inpatient rehab are to achieve modified independence with bed mobility, modified independence with transfers and modified independence with ambulation over an LOS of 14 days.    Will continue to follow. Rehab recommendations are dependent on how functional progress changes as well as how patient continues to participate and tolerate therapeutic interventions, which may change. Recommend ongoing mobilization by staff to maintain cardiopulmonary function and prevention of secondary complications related to debility. Discussed the specific management and recommendations above with rehab clinical care team/rehab liaison.

## 2023-11-28 NOTE — PROGRESS NOTE ADULT - SUBJECTIVE AND OBJECTIVE BOX
Patient seen and examined . S/p R hip hemiarthroplasty ,  , POD # 1. C/O pain at surgical site , denies n/v , voiding , on O2 2 L due to hypoxia noted down to 89%.   Denies sob/chest pain .     CC : R hip pain at surgical site , hypoxia noted     MEDICATIONS  (STANDING):  acetaminophen     Tablet .. 975 milliGRAM(s) Oral every 8 hours  ceFAZolin  Injectable. 2000 milliGRAM(s) IV Push once  enoxaparin Injectable 40 milliGRAM(s) SubCutaneous every 24 hours  influenza  Vaccine (HIGH DOSE) 0.7 milliLiter(s) IntraMuscular once  multivitamin 1 Tablet(s) Oral daily  mupirocin 2% Ointment 1 Application(s) Both Nostrils two times a day  polyethylene glycol 3350 17 Gram(s) Oral at bedtime  povidone iodine 5% Nasal Swab 1 Application(s) Both Nostrils once  senna 2 Tablet(s) Oral at bedtime  sodium chloride 0.9%. 1000 milliLiter(s) (100 mL/Hr) IV Continuous <Continuous>  vancomycin  IVPB 1000 milliGRAM(s) IV Intermittent once    MEDICATIONS  (PRN):  HYDROmorphone  Injectable 0.5 milliGRAM(s) IV Push every 3 hours PRN breakthrough pain  magnesium hydroxide Suspension 30 milliLiter(s) Oral daily PRN Constipation  ondansetron Injectable 4 milliGRAM(s) IV Push every 6 hours PRN Nausea and/or Vomiting  traMADol 50 milliGRAM(s) Oral every 6 hours PRN Mild Pain (1 - 3)  traMADol 50 milliGRAM(s) Oral every 4 hours PRN Moderate Pain (4 - 6)  traMADol 100 milliGRAM(s) Oral every 6 hours PRN Severe Pain (7 - 10)      LABS:                          .      .     )-----------( .        ( 28 Nov 2023 08:37 )             .        11-28    .   |  .   |  .   ----------------------------<  .   .    |  .   |  .     Ca    .       28 Nov 2023 08:37  Phos  2.4     11-28  Mg     1.7     11-28  Troponin T, High Sensitivity (11.28.23 @ 08:37)    Troponin T, High Sensitivity Result: .:  8   *  Rapid upward or downward changes in high-sensitivity troponin levels  suggest acute myocardial injury. Renal impairment may cause sustained  troponin elevations.  Normal: <6 - 14 ng/L  Indeterminate: 15-51 ng/L  Elevated: > 51 ng/L  See http://labs/test/TROPTHS on the [facility] intranet for more  information  *  *  Rapid upward or downward changes in high-sensitivity troponin levels  suggest acute myocardial injury. Renal impairment may cause sustained  troponin elevations.  Normal: <6 - 14ng/L  Indeterminate: 15-51 ng/L  Elevated: > 51 ng/L  See http://labs/test/TROPTHS on the [facility] [employer] for more  information ng/L    Pro-Brain Natriuretic Peptide (11.28.23 @ 08:37)    Pro-Brain Natriuretic Peptide: . pg/mL- 64      < from: 12 Lead ECG (11.26.23 @ 12:32) >    Ventricular Rate 83 BPM    Atrial Rate 83 BPM    P-R Interval 166 ms    QRS Duration 122 ms    Q-T Interval 448 ms    QTC Calculation(Bazett) 526 ms    P Axis 63 degrees    R Axis 19 degrees    T Axis 32 degrees    Diagnosis Line Sinus rhythm with frequent Premature ventricular complexes  Right bundle branch block  Abnormal ECG    < end of copied text >    < from: Xray Chest 1 View-PORTABLE IMMEDIATE (Xray Chest 1 View-PORTABLE IMMEDIATE .) (11.28.23 @ 10:01) >    ACC: [id number] EXAM:  XR CHEST PORTABLE IMMED 1V   ORDERED BY: SRUTHI MARTINEZ     PROCEDURE DATE:  11/28/2023          INTERPRETATION:  Clinical history: 83-year-old female, postop hypoxia.    Portable view of the chest is compared to 11/26/2023.    FINDINGS: Normal cardiac silhouette and normal pulmonary vasculature with   no consolidation, effusion, pneumothorax or acute osseous finding.    IMPRESSION:    No acute radiographic findings and no change    --- End of Report ---      < end of copied text >              REVIEW OF SYSTEMS:    R hip pain at surgical site , all other systems are reviewed and are negative .     Vital Signs Last 24 Hrs  T(C): 36.9 (28 Nov 2023 09:40), Max: 37.2 (28 Nov 2023 04:11)  T(F): 98.5 (28 Nov 2023 09:40), Max: 99 (28 Nov 2023 04:11)  HR: 90 (28 Nov 2023 09:40) (78 - 97)  BP: 145/54 (28 Nov 2023 09:40) (121/73 - 146/71)  BP(mean): --  RR: 18 (28 Nov 2023 09:40) (18 - 18)  SpO2: 93% (28 Nov 2023 09:40) (93% - 96%)    Parameters below as of 28 Nov 2023 09:40  Patient On (Oxygen Delivery Method): nasal cannula  O2 Flow (L/min): 2    PHYSICAL EXAM:    GENERAL: NAD, well-groomed, well-developed  HEAD:  Atraumatic, Normocephalic  EYES: EOMI, PERRLA, conjunctiva and sclera clear  NECK: Supple, No JVD, Normal thyroid  NERVOUS SYSTEM:  Alert & Oriented X4, , no focal deficit  CHEST/LUNG: CTA b/l ,  no  rales, rhonchi, wheezing, or rubs  HEART: Regular rate and rhythm; No murmurs, rubs, or gallops  ABDOMEN: Soft, Nontender, Nondistended; Bowel sounds present  EXTREMITIES:  2+ Peripheral Pulses, No clubbing, cyanosis, or edema, R hip with dry dressing .   Small area of dried bloody drainage noted on proximal aspect of dressings inferiorly.     No erythema is noted. No blistering.  Ecchymosis noted to right hip/ groin region.   LYMPH: No lymphadenopathy noted  SKIN: No rashes or lesions

## 2023-11-28 NOTE — PROGRESS NOTE ADULT - SUBJECTIVE AND OBJECTIVE BOX
RODRICK COSTELLO    [id number]    History: Patient is status post right hip mahesh- arthroplasty on 11/27, POD#1.   Patient is doing well. The patient's pain is controlled using the prescribed pain medications. The patient is participating in physical therapy, WBAT RLE in knee immobilizer at all times. Denies nausea, vomiting, chest pain, shortness of breath, abdominal pain or fever. No new orthopedic complaints.    Vital Signs Last 24 Hrs  T(C): 37.2 (28 Nov 2023 04:11), Max: 37.2 (28 Nov 2023 04:11)  T(F): 99 (28 Nov 2023 04:11), Max: 99 (28 Nov 2023 04:11)  HR: 97 (28 Nov 2023 04:11) (77 - 97)  BP: 129/72 (28 Nov 2023 04:11) (121/73 - 146/71)  BP(mean): --  RR: 18 (28 Nov 2023 04:11) (18 - 19)  SpO2: 95% (28 Nov 2023 04:11) (94% - 96%)    Parameters below as of 28 Nov 2023 04:11  Patient On (Oxygen Delivery Method): nasal cannula  O2 Flow (L/min): 2                            11.2   10.49 )-----------( 178      ( 28 Nov 2023 04:45 )             34.4       11-28    133<L>  |  98  |  16.0  ----------------------------<  109<H>  4.0   |  24.0  |  0.79    Ca    8.3<L>      28 Nov 2023 04:45  Phos  2.4     11-28  Mg     1.7     11-28    TPro  7.6  /  Alb  4.4  /  TBili  0.4  /  DBili  x   /  AST  19  /  ALT  12  /  AlkPhos  71  11-26      MEDICATIONS  (STANDING):  acetaminophen     Tablet .. 975 milliGRAM(s) Oral every 8 hours  ceFAZolin  Injectable. 2000 milliGRAM(s) IV Push once  enoxaparin Injectable 40 milliGRAM(s) SubCutaneous every 24 hours  influenza  Vaccine (HIGH DOSE) 0.7 milliLiter(s) IntraMuscular once  multivitamin 1 Tablet(s) Oral daily  mupirocin 2% Ointment 1 Application(s) Both Nostrils two times a day  polyethylene glycol 3350 17 Gram(s) Oral at bedtime  povidone iodine 5% Nasal Swab 1 Application(s) Both Nostrils once  senna 2 Tablet(s) Oral at bedtime  sodium chloride 0.9%. 1000 milliLiter(s) (100 mL/Hr) IV Continuous <Continuous>  vancomycin  IVPB 1000 milliGRAM(s) IV Intermittent once    MEDICATIONS  (PRN):  HYDROmorphone  Injectable 0.5 milliGRAM(s) IV Push every 3 hours PRN breakthrough pain  magnesium hydroxide Suspension 30 milliLiter(s) Oral daily PRN Constipation  ondansetron Injectable 4 milliGRAM(s) IV Push every 6 hours PRN Nausea and/or Vomiting  traMADol 50 milliGRAM(s) Oral every 6 hours PRN Mild Pain (1 - 3)  traMADol 50 milliGRAM(s) Oral every 4 hours PRN Moderate Pain (4 - 6)  traMADol 100 milliGRAM(s) Oral every 6 hours PRN Severe Pain (7 - 10)      Physical exam: The right hip mepilex dressing is clean, dry and intact. Small area of dried bloody drainage noted on proximal aspect of dressings inferiorly.   No erythema is noted. No blistering.  Ecchymosis noted to right hip/ groin region. The calf is supple nontender. Passive range of motion is acceptable to due postoperative pain. No calf tenderness. Sensation to light touch is grossly intact distally. Motor function distally is 5/5. No foot drop. 2+ dorsalis pedis pulse. Capillary refill is less than 2 seconds. No cyanosis.    Primary Orthopedic Assessment:  • s/p RIGHT hip hemiarthroplasty    Secondary  Orthopedic Assessment(s):   •     Secondary  Medical Assessment(s):   •     Plan:   • DVT prophylaxis as prescribed, Lovenox 40 mg daily in addition to use of compression devices and ankle pumps  • Continue physical therapy, WBAT RLE in knee immobilizer  • Weightbearing as tolerated of the right lower extremity with assistance of a walker and knee immobilizer at all times.   • Incentive spirometry encouraged  • Pain control as clinically indicated  • Anterior  hip precautions reviewed with patient  • Discharge planning – anticipated discharge is subacute rehabilitation / acute rehabilitation

## 2023-11-29 LAB
OSMOLALITY SERPL: 282 MOSMOL/KG — SIGNIFICANT CHANGE UP (ref 280–301)
OSMOLALITY SERPL: 282 MOSMOL/KG — SIGNIFICANT CHANGE UP (ref 280–301)
OSMOLALITY UR: 212 MOSM/KG — LOW (ref 300–1000)
OSMOLALITY UR: 212 MOSM/KG — LOW (ref 300–1000)
SODIUM SERPL-SCNC: 133 MMOL/L — LOW (ref 135–145)
SODIUM SERPL-SCNC: 133 MMOL/L — LOW (ref 135–145)
SODIUM UR-SCNC: <30 MMOL/L — SIGNIFICANT CHANGE UP
SODIUM UR-SCNC: <30 MMOL/L — SIGNIFICANT CHANGE UP

## 2023-11-29 PROCEDURE — 99232 SBSQ HOSP IP/OBS MODERATE 35: CPT

## 2023-11-29 RX ADMIN — Medication 975 MILLIGRAM(S): at 04:03

## 2023-11-29 RX ADMIN — TRAMADOL HYDROCHLORIDE 100 MILLIGRAM(S): 50 TABLET ORAL at 08:04

## 2023-11-29 RX ADMIN — Medication 975 MILLIGRAM(S): at 14:40

## 2023-11-29 RX ADMIN — ENOXAPARIN SODIUM 40 MILLIGRAM(S): 100 INJECTION SUBCUTANEOUS at 05:06

## 2023-11-29 RX ADMIN — MUPIROCIN 1 APPLICATION(S): 20 OINTMENT TOPICAL at 17:56

## 2023-11-29 RX ADMIN — Medication 975 MILLIGRAM(S): at 23:00

## 2023-11-29 RX ADMIN — Medication 975 MILLIGRAM(S): at 13:45

## 2023-11-29 RX ADMIN — MUPIROCIN 1 APPLICATION(S): 20 OINTMENT TOPICAL at 05:05

## 2023-11-29 RX ADMIN — TRAMADOL HYDROCHLORIDE 100 MILLIGRAM(S): 50 TABLET ORAL at 09:00

## 2023-11-29 RX ADMIN — Medication 975 MILLIGRAM(S): at 22:17

## 2023-11-29 NOTE — PROGRESS NOTE ADULT - ASSESSMENT
83-year-old female with PMHx Hashimoto's thyroiditis, Vertigo in the past , irregular heart rate , no other PMHx presents for right hip injury after mechanical fall in her kitchen while wearing slippers.  Patient landed on her right hip, obvious deformity noted.  She denies head trauma, LOC.  She takes no blood thinners. Now s/p R hip hemiarthroplasty .     1. R  Acute displaced right femoral neck fracture after mechanical fall , no LOC ,   S/P R hip hemiarthroplasty , POD# 2  PT/OT/pain mgmt  DVT prophylaxis- as per ortho  Abx as per SCIP- given  Incentive spirometry  Prophylaxis of opioid  induced constipation.  Wound care / WB status / Immobilizer.     2. Mechanical fall - PT/OT, fall precautions     3. Postop leucocytosis - no S/S of  infection , likely reactive - resolved     4. Hyponatremia - 131---133-- 129 , this am 133 - resolving , urine study / serum osmolality reviewed ,   hyponatremia due to poor oral intake . Patient with improved appetite today .     5. Nausea and vomiting- periop - likely due to meds - now resolved     6. DVT prophylaxis  - as per ortho protocol  Opioid induced constipation  prophylaxis - bowel regimen     7. Noted with hypoxia while asleep , also noted with O2 sat at 89% yesterday   Patient noted not taking deep breaths , not using IS . BNP noted - not elevated Tni - negative , EKG no changes ,   CXR without pathology . Hypoxia most likely due to pain / opioids . Patient educated to take deep breaths  , use IS ,   Today saturating well - 94% on RA , may use O2 PRN to keep O2 sat > 93%  presumed Sleep Apnea , will recommend Sleep Study as on outpatient .       8. Hx of irregular heart rate , EKG noted abnormal  with PVC , RBBB , patient states she does follow up with cardiologist .     Dispo plan - likely rehab .      Medically stable to d/c once cleared by physical therapy / ortho .   Will sign off , please recall if any medical issue . 
83-year-old female with PMHx Hashimoto's thyroiditis, Vertigo in the past , irregular heart rate , no other PMHx presents for right hip injury after mechanical fall in her kitchen while wearing slippers.  Patient landed on her right hip, obvious deformity noted.  She denies head trauma, LOC.  She takes no blood thinners. Now s/p R hip hemiarthroplasty .     1. R  Acute displaced right femoral neck fracture after mechanical fall , no LOC ,   S/P R hip hemiarthroplasty , POD#1   PT/OT/pain mgmt  DVT prophylaxis- as per ortho  Abx as per SCIP- given  Incentive spirometry  Prophylaxis of opioid  induced constipation.  Wound care / WB status / Immobilizer.     2. Mechanical fall - PT/OT, fall precautions     3. Postop leucocytosis - no S/S of  infection , likely reactive - resolved     4. Hyponatremia - [id number]---880- resolving with ivf - likely due to episodes of vomiting , poor oral intake ,    5. Nausea and vomiting - not present prior arrival to the hospital .   Spencer nauseate in ED after Morphine  and Cefazolin given ,  felt nauseated and vomited after Cefazolin given yesterday ,   no abd pain , had BM 2 days ago  . Most likely nausea , episodes of vomiting due to Morphine / cefazolin .   Today nausea resolved     6. DVT prophylaxis  - as per ortho protocol  Opioid induced constipation  prophylaxis - bowel regimen     7. Noted with hypoxia while asleep , also noted with O2 sat at 89% this am .   Patient noted not taking deep breaths , not using IS . BNP noted - not elevated Tni - negative , EKG no changes ,   CXR without pathology . Hypoxia most likely due to pain / opioids . Patient educated to take deep breaths  , use IS ,   continue O2 to keep O 2 sat > 93% ,  patient admits that she snores ,   presumed Sleep Apnea , will recommend Sleep Study as on outpatient .   Check O2 sat on RA while ambulating .     8. Hx of irregular heart rate , EKG noted abnormal  with PVC , RBBB , patient states she does follow up with cardiologist .     Dispo plan - likely rehab .      Medically stable to d/c once cleared by physical therapy / ortho .

## 2023-11-29 NOTE — PROGRESS NOTE ADULT - SUBJECTIVE AND OBJECTIVE BOX
NOTE INCOMPLETE    SUBJECTIVE:      REVIEW OF SYSTEMS  Constitutional - No fever, No fatigue  HEENT - No visual disturbances, No vertigo, No neck pain  Respiratory - No cough, No wheezing, No shortness of breath  Cardiovascular - No chest pain, No palpitations  Gastrointestinal - No abdominal pain, No nausea, No vomiting, No diarrhea, No constipation  Genitourinary - No dysuria, No incontinence  Neurological - No headaches, No memory loss, No loss of strength, No numbness, No tremors  Skin - No itching, No rashes, No lesions   Musculoskeletal - +Right hip pain   Psychiatric - No depression, No anxiety    FUNCTIONAL PROGRESS  PT 11/27/23  bed mobility - mod A  transfers - mod A   gait - mod A 2 steps at bedside, limited 2/2 pain     OT 11/28  bed mobility - min-mod A  transfers - min A  UBD - min A  LBD - dependent       VITALS  T(C): 36.4 (11-29-23 @ 09:12), Max: 37.1 (11-29-23 @ 05:12)  HR: 94 (11-29-23 @ 09:12) (87 - 97)  BP: 150/70 (11-29-23 @ 09:12) (140/70 - 161/72)  RR: 18 (11-29-23 @ 09:12) (16 - 18)  SpO2: 97% (11-29-23 @ 09:12) (94% - 98%)  Wt(kg): --    MEDICATIONS   acetaminophen     Tablet .. 975 milliGRAM(s) every 8 hours  acetaminophen   IVPB .. 1000 milliGRAM(s) once  ceFAZolin  Injectable. 2000 milliGRAM(s) once  enoxaparin Injectable 40 milliGRAM(s) every 24 hours  HYDROmorphone  Injectable 0.5 milliGRAM(s) every 3 hours PRN  influenza  Vaccine (HIGH DOSE) 0.7 milliLiter(s) once  magnesium hydroxide Suspension 30 milliLiter(s) daily PRN  multivitamin 1 Tablet(s) daily  mupirocin 2% Ointment 1 Application(s) two times a day  ondansetron Injectable 4 milliGRAM(s) every 6 hours PRN  polyethylene glycol 3350 17 Gram(s) at bedtime  povidone iodine 5% Nasal Swab 1 Application(s) once  senna 2 Tablet(s) at bedtime  sodium chloride 0.9%. 1000 milliLiter(s) <Continuous>  traMADol 50 milliGRAM(s) every 6 hours PRN  traMADol 50 milliGRAM(s) every 4 hours PRN  traMADol 100 milliGRAM(s) every 6 hours PRN  vancomycin  IVPB 1000 milliGRAM(s) once      RECENT LABS - Reviewed                        11.6   9.58  )-----------( 172      ( 28 Nov 2023 17:42 )             34.3     11-29    133<L>  |  x   |  x   ----------------------------<  x   x    |  x   |  x     Ca    8.2<L>      28 Nov 2023 17:42  Phos  2.4     11-28  Mg     1.7     11-28        Urinalysis Basic - ( 28 Nov 2023 17:42 )    Color: x / Appearance: x / SG: x / pH: x  Gluc: 104 mg/dL / Ketone: x  / Bili: x / Urobili: x   Blood: x / Protein: x / Nitrite: x   Leuk Esterase: x / RBC: x / WBC x   Sq Epi: x / Non Sq Epi: x / Bacteria: x      PHYSICAL EXAM  Constitutional - NAD, Comfortable  HEENT - NCAT, EOMI  Chest - Breathing comfortably, No wheezing, +2LNC  Cardiovascular - S1S2   Abdomen - Soft   Extremities - R hip surgical dressing in place scant old blood noted on dressing. no active bleeding or drainage, surrounding ecchymoses, mild ttp. R knee immobilizer in place  Neurologic Exam -                    Cognitive - AAO to self, place, date, year, situation     Communication - Fluent, No dysarthria     Cranial Nerves - CN 2-12 intact     Motor -                    LEFT    UE - ShAB 5/5, EF 5/5, EE 5/5, WE 5/5,  5/5                    RIGHT UE - ShAB 5/5, EF 5/5, EE 5/5, WE 5/5,  5/5                    LEFT    LE - HF limited 2/2 pain, KE limited as RLE in KI, DF 5/5, PF 5/5                    RIGHT LE - HF 5/5, KE 5/5, DF 5/5, PF 5/5        Sensory - Intact to LT  Psychiatric - Mood stable, Affect WNL      ----------------------------------------------------------------------------------------    ASSESSMENT/PLAN  83yFemale with functional deficits after mechanical fall resulting in R femoral neck fx s/p R hip hemiarthroplasty  R femoral neck fx s/p R hip hemiarthroplasty - RLE in knee immobilizer at all times. Patient complaining of proximal posterior part of the KI digging into her leg, advise padding to prevent skin breakdown. WBAT, maintain anterior hip precautions. Pain control as below. post-op abx per ortho   Pain - ice, Tylenol, tramadol 50/100 PRN. IV hydromorphone must be dc'd prior to discharge to rehab   Bowel regimen - senna qhs, miralax qhs, MoM PRN, maintain while taking opioids to prevent constipation.   Vomiting - x1 after morphine and abx administration, resolved. Zofran prn prior to abx administration.   hypoxia - resolved, cont IS, CXR neg. Minimize opioid use, encourage, ice, tylenol, especially prior to PT/OT  DVT PPX - SCDs, lovenox  Rehab/Impaired mobility and function - Patient continues to require hospitalization for the above diagnoses and ongoing active management of comorbid complications that are substantially impairing functional ability and impairing quality of life necessitating ongoing medical management of these complications necessitating acute rehab.     When medically optimized, based on the patient's diagnosis, current functional status and potential for progress, recommend ACUTE inpatient rehabilitation for the functional deficits consisting of 3 hours of therapy/day & 24 hour RN/daily PMR physician for comorbid medical management. Patient will be able to tolerate 3 hours a day.    Goals for acute inpatient rehab are to achieve modified independence with bed mobility, modified independence with transfers and modified independence with ambulation over an LOS of 14 days.    Will continue to follow. Rehab recommendations are dependent on how functional progress changes as well as how patient continues to participate and tolerate therapeutic interventions, which may change. Recommend ongoing mobilization by staff to maintain cardiopulmonary function and prevention of secondary complications related to debility. Discussed the specific management and recommendations above with rehab clinical care team/rehab liaison.   SUBJECTIVE: Patient seen and examined at bedside. Continues to report pain at right hip surgical site which occurs with bed mobility, transfers and attempted ambulation. She has not been asking for ice packs after activity. Discussed episodes of hypoxia in the mornings, patient states that she does not feel any CP, palpitations, SOB or wheezing. Notes that her anxiety has worsened since admission, especially since her mobility and independence are limited due to her hip fracture. Provided emotional reassurance.       REVIEW OF SYSTEMS  Constitutional - No fever, No fatigue  HEENT - No visual disturbances, No vertigo, No neck pain  Respiratory - No cough, No wheezing, No shortness of breath  Cardiovascular - No chest pain, No palpitations  Gastrointestinal - No abdominal pain, No nausea, No vomiting, No diarrhea, +constipation  Genitourinary - No dysuria, No incontinence  Neurological - No headaches, No memory loss, No loss of strength, No numbness, No tremors  Skin - No itching, No rashes, No lesions   Musculoskeletal - +Right hip pain   Psychiatric - No depression, +anxiety    FUNCTIONAL PROGRESS  PT 11/27/23  bed mobility - mod A  transfers - mod A   gait - mod A 2 steps at bedside, limited 2/2 pain     OT 11/28  bed mobility - min-mod A  transfers - min A  UBD - min A  LBD - dependent       VITALS  T(C): 36.4 (11-29-23 @ 09:12), Max: 37.1 (11-29-23 @ 05:12)  HR: 94 (11-29-23 @ 09:12) (87 - 97)  BP: 150/70 (11-29-23 @ 09:12) (140/70 - 161/72)  RR: 18 (11-29-23 @ 09:12) (16 - 18)  SpO2: 97% (11-29-23 @ 09:12) (94% - 98%)  Wt(kg): --    MEDICATIONS   acetaminophen     Tablet .. 975 milliGRAM(s) every 8 hours  acetaminophen   IVPB .. 1000 milliGRAM(s) once  ceFAZolin  Injectable. 2000 milliGRAM(s) once  enoxaparin Injectable 40 milliGRAM(s) every 24 hours  HYDROmorphone  Injectable 0.5 milliGRAM(s) every 3 hours PRN  influenza  Vaccine (HIGH DOSE) 0.7 milliLiter(s) once  magnesium hydroxide Suspension 30 milliLiter(s) daily PRN  multivitamin 1 Tablet(s) daily  mupirocin 2% Ointment 1 Application(s) two times a day  ondansetron Injectable 4 milliGRAM(s) every 6 hours PRN  polyethylene glycol 3350 17 Gram(s) at bedtime  povidone iodine 5% Nasal Swab 1 Application(s) once  senna 2 Tablet(s) at bedtime  sodium chloride 0.9%. 1000 milliLiter(s) <Continuous>  traMADol 50 milliGRAM(s) every 6 hours PRN  traMADol 50 milliGRAM(s) every 4 hours PRN  traMADol 100 milliGRAM(s) every 6 hours PRN  vancomycin  IVPB 1000 milliGRAM(s) once      RECENT LABS - Reviewed                        11.6   9.58  )-----------( 172      ( 28 Nov 2023 17:42 )             34.3     11-29    133<L>  |  x   |  x   ----------------------------<  x   x    |  x   |  x     Ca    8.2<L>      28 Nov 2023 17:42  Phos  2.4     11-28  Mg     1.7     11-28        Urinalysis Basic - ( 28 Nov 2023 17:42 )    Color: x / Appearance: x / SG: x / pH: x  Gluc: 104 mg/dL / Ketone: x  / Bili: x / Urobili: x   Blood: x / Protein: x / Nitrite: x   Leuk Esterase: x / RBC: x / WBC x   Sq Epi: x / Non Sq Epi: x / Bacteria: x      PHYSICAL EXAM  Constitutional - NAD, Comfortable  HEENT - NCAT, EOMI  Chest - Breathing comfortably, No wheezing, +2LNC  Cardiovascular - S1S2   Abdomen - Soft   Extremities - R hip surgical dressing in place scant old blood noted on dressing. no active bleeding or drainage, surrounding ecchymoses, mild ttp. R knee immobilizer in place  Neurologic Exam -                    Cognitive - AAO to self, place, date, year, situation     Communication - Fluent, No dysarthria     Cranial Nerves - CN 2-12 intact     Motor -                    LEFT    UE - ShAB 5/5, EF 5/5, EE 5/5, WE 5/5,  5/5                    RIGHT UE - ShAB 5/5, EF 5/5, EE 5/5, WE 5/5,  5/5                    LEFT    LE - HF limited 2/2 pain, KE limited as RLE in KI, DF 5/5, PF 5/5                    RIGHT LE - HF 5/5, KE 5/5, DF 5/5, PF 5/5        Sensory - Intact to LT  Psychiatric - Mood stable, Affect WNL      ----------------------------------------------------------------------------------------    ASSESSMENT/PLAN  83yFemale with functional deficits after mechanical fall resulting in R femoral neck fx s/p R hip hemiarthroplasty  R femoral neck fx s/p R hip hemiarthroplasty - RLE in knee immobilizer at all times. Patient complaining of proximal posterior part of the KI digging into her leg, advise padding to prevent skin breakdown. WBAT, maintain anterior hip precautions. Pain control as below. post-op abx per ortho   Pain - ice, Tylenol, tramadol 50/100 PRN. IV hydromorphone must be dc'd prior to discharge to rehab. encouraged patient to use ice frequently especially before and after transfers and therapy sessions.   Bowel regimen - senna qhs, miralax qhs, MoM PRN, maintain while taking opioids to prevent constipation.   Vomiting - x1 after morphine and abx administration, resolved. Zofran prn prior to abx administration.   hypoxia - resolved, cont IS, CXR neg. Minimize opioid use, encourage, ice, tylenol, especially prior to PT/OT. Reviewed calming measures with patient. Encouraged use of incentive spirometer.   DVT PPX - SCDs, lovenox  Rehab/Impaired mobility and function - Patient continues to require hospitalization for the above diagnoses and ongoing active management of comorbid complications that are substantially impairing functional ability and impairing quality of life necessitating ongoing medical management of these complications necessitating acute rehab.     When medically optimized, based on the patient's diagnosis, current functional status and potential for progress, recommend ACUTE inpatient rehabilitation for the functional deficits consisting of 3 hours of therapy/day & 24 hour RN/daily PMR physician for comorbid medical management. Patient will be able to tolerate 3 hours a day.    Goals for acute inpatient rehab are to achieve modified independence with bed mobility, modified independence with transfers and modified independence with ambulation over an LOS of 14 days.    Will continue to follow. Rehab recommendations are dependent on how functional progress changes as well as how patient continues to participate and tolerate therapeutic interventions, which may change. Recommend ongoing mobilization by staff to maintain cardiopulmonary function and prevention of secondary complications related to debility. Discussed the specific management and recommendations above with rehab clinical care team/rehab liaison.

## 2023-11-29 NOTE — PROGRESS NOTE ADULT - SUBJECTIVE AND OBJECTIVE BOX
Patient seen and examined . S/p R hip hemiarthroplasty   , POD # 2. C/O R hip pain , appetite better today , using IS , taking deep breaths , O2 sat 94 % on RA , denies sob/ chest pain , voiding .     CC : R hip pain not well controlled       MEDICATIONS  (STANDING):  acetaminophen     Tablet .. 975 milliGRAM(s) Oral every 8 hours  acetaminophen   IVPB .. 1000 milliGRAM(s) IV Intermittent once  ceFAZolin  Injectable. 2000 milliGRAM(s) IV Push once  enoxaparin Injectable 40 milliGRAM(s) SubCutaneous every 24 hours  influenza  Vaccine (HIGH DOSE) 0.7 milliLiter(s) IntraMuscular once  multivitamin 1 Tablet(s) Oral daily  mupirocin 2% Ointment 1 Application(s) Both Nostrils two times a day  polyethylene glycol 3350 17 Gram(s) Oral at bedtime  povidone iodine 5% Nasal Swab 1 Application(s) Both Nostrils once  senna 2 Tablet(s) Oral at bedtime  sodium chloride 0.9%. 1000 milliLiter(s) (100 mL/Hr) IV Continuous <Continuous>  vancomycin  IVPB 1000 milliGRAM(s) IV Intermittent once    MEDICATIONS  (PRN):  HYDROmorphone  Injectable 0.5 milliGRAM(s) IV Push every 3 hours PRN breakthrough pain  magnesium hydroxide Suspension 30 milliLiter(s) Oral daily PRN Constipation  ondansetron Injectable 4 milliGRAM(s) IV Push every 6 hours PRN Nausea and/or Vomiting  traMADol 50 milliGRAM(s) Oral every 6 hours PRN Mild Pain (1 - 3)  traMADol 50 milliGRAM(s) Oral every 4 hours PRN Moderate Pain (4 - 6)  traMADol 100 milliGRAM(s) Oral every 6 hours PRN Severe Pain (7 - 10)      LABS:                          11.6   9.58  )-----------( 172      ( 28 Nov 2023 17:42 )             34.3     11-29    133<L>  |  x   |  x   ----------------------------<  x   x    |  x   |  x     Ca    8.2<L>      28 Nov 2023 17:42  Phos  2.4     11-28  Mg     1.7     11-28      Basic Metabolic Panel - STAT (11.28.23 @ 17:42)    Sodium: 129 mmol/L    Sodium (11.29.23 @ 09:37)    Sodium: 133 mmol/L    Osmolality, Serum (11.29.23 @ 09:37)    Osmolality, Serum: 282 mosmol/kg    Sodium, Random Urine (11.29.23 @ 08:41)    Sodium, Random Urine: <30: Reference Ranges have NOT been established for random urine analytes due  to variability in fluid intake and concentration. mmol/L          RADIOLOGY & ADDITIONAL TESTS:    < from: Xray Chest 1 View-PORTABLE IMMEDIATE (Xray Chest 1 View-PORTABLE IMMEDIATE .) (11.28.23 @ 10:01) >    ACC: [id number] EXAM:  XR CHEST PORTABLE IMMED 1V   ORDERED BY: SRUTHI MARTINEZ     PROCEDURE DATE:  11/28/2023          INTERPRETATION:  Clinical history: 83-year-old female, postop hypoxia.    Portable view of the chest is compared to 11/26/2023.    FINDINGS: Normal cardiac silhouette and normal pulmonary vasculature with   no consolidation, effusion, pneumothorax or acute osseous finding.    IMPRESSION:    No acute radiographic findings and no change    --- End of Report ---      < end of copied text >  < from: 12 Lead ECG (11.28.23 @ 09:45) >    Ventricular Rate 78 BPM    Atrial Rate 78 BPM    P-R Interval 136 ms    QRS Duration 120 ms    Q-T Interval 426 ms    QTC Calculation(Bazett) 485 ms    P Axis 10 degrees    R Axis 8 degrees    T Axis 11 degrees    Diagnosis Line Normal sinus rhythm  Left atrial enlargement  Right bundle branch block  Abnormal ECG    < end of copied text >        REVIEW OF SYSTEMS:    R hip pain postop not well controlled     Vital Signs Last 24 Hrs  T(C): 36.7 (29 Nov 2023 13:39), Max: 37.1 (29 Nov 2023 05:12)  T(F): 98.1 (29 Nov 2023 13:39), Max: 98.8 (29 Nov 2023 05:12)  HR: 90 (29 Nov 2023 13:39) (87 - 97)  BP: 128/64 (29 Nov 2023 13:39) (128/64 - 161/72)  BP(mean): --  RR: 18 (29 Nov 2023 13:39) (16 - 18)  SpO2: 94% (29 Nov 2023 14:22) (92% - 98%)    Parameters below as of 29 Nov 2023 14:22  Patient On (Oxygen Delivery Method): room air      PHYSICAL EXAM:    GENERAL: NAD, well-groomed, well-developed  HEAD:  Atraumatic, Normocephalic  EYES: EOMI, PERRLA, conjunctiva and sclera clear  NECK: Supple, No JVD, Normal thyroid  NERVOUS SYSTEM:  Alert & Oriented X4, no focal deficit  CHEST/LUNG: CTA b/l ,  no  rales, rhonchi, wheezing, or rubs  HEART: Regular rate and rhythm; No murmurs, rubs, or gallops  ABDOMEN: Soft, Nontender, Nondistended; Bowel sounds present  EXTREMITIES:  2+ Peripheral Pulses, No clubbing, cyanosis, or edema.   R hip with dry and clean dressing + ( dressing changed earlier by ortho ) ,    No erythema is noted. Large area of  ecchymosis noted to right thigh, buttock, hip.    LYMPH: No lymphadenopathy noted  SKIN: No rashes or lesions

## 2023-11-29 NOTE — PROGRESS NOTE ADULT - SUBJECTIVE AND OBJECTIVE BOX
RODRICK COSTELLO    [id number]    History: Patient is status post right  hip mahesh arthroplasty on 11/27, POD#2.  Patient is doing well. The patient's pain is controlled using the prescribed pain medications. The patient is participating in physical therapy WBAT and KI at all times. Denies nausea, vomiting, chest pain, shortness of breath, abdominal pain or fever. No new orthopedic complaints.    Vital Signs Last 24 Hrs  T(C): 37.1 (29 Nov 2023 05:12), Max: 37.1 (29 Nov 2023 05:12)  T(F): 98.8 (29 Nov 2023 05:12), Max: 98.8 (29 Nov 2023 05:12)  HR: 96 (29 Nov 2023 05:12) (80 - 97)  BP: 140/70 (29 Nov 2023 05:12) (136/60 - 161/72)  BP(mean): --  RR: 18 (29 Nov 2023 05:12) (16 - 18)  SpO2: 94% (29 Nov 2023 05:12) (93% - 98%)    Parameters below as of 29 Nov 2023 05:12  Patient On (Oxygen Delivery Method): nasal cannula  O2 Flow (L/min): 2                            11.6   9.58  )-----------( 172      ( 28 Nov 2023 17:42 )             34.3       11-28    129<L>  |  92<L>  |  12.6  ----------------------------<  104<H>  3.7   |  25.0  |  0.57    Ca    8.2<L>      28 Nov 2023 17:42  Phos  2.4     11-28  Mg     1.7     11-28        MEDICATIONS  (STANDING):  acetaminophen     Tablet .. 975 milliGRAM(s) Oral every 8 hours  acetaminophen   IVPB .. 1000 milliGRAM(s) IV Intermittent once  ceFAZolin  Injectable. 2000 milliGRAM(s) IV Push once  enoxaparin Injectable 40 milliGRAM(s) SubCutaneous every 24 hours  influenza  Vaccine (HIGH DOSE) 0.7 milliLiter(s) IntraMuscular once  multivitamin 1 Tablet(s) Oral daily  mupirocin 2% Ointment 1 Application(s) Both Nostrils two times a day  polyethylene glycol 3350 17 Gram(s) Oral at bedtime  povidone iodine 5% Nasal Swab 1 Application(s) Both Nostrils once  senna 2 Tablet(s) Oral at bedtime  sodium chloride 0.9%. 1000 milliLiter(s) (100 mL/Hr) IV Continuous <Continuous>  vancomycin  IVPB 1000 milliGRAM(s) IV Intermittent once    MEDICATIONS  (PRN):  HYDROmorphone  Injectable 0.5 milliGRAM(s) IV Push every 3 hours PRN breakthrough pain  magnesium hydroxide Suspension 30 milliLiter(s) Oral daily PRN Constipation  ondansetron Injectable 4 milliGRAM(s) IV Push every 6 hours PRN Nausea and/or Vomiting  traMADol 50 milliGRAM(s) Oral every 4 hours PRN Moderate Pain (4 - 6)  traMADol 100 milliGRAM(s) Oral every 6 hours PRN Severe Pain (7 - 10)  traMADol 50 milliGRAM(s) Oral every 6 hours PRN Mild Pain (1 - 3)      Physical exam: The right hip mepilex dressing has moderate bloody drainage.  Dressings removed,  No drainage noted.  No erythema is noted. Large area of  ecchymosis noted to right thigh, buttock, hip.  The calf is supple nontender. Passive range of motion is acceptable  due to postoperative pain. No calf tenderness. Sensation to light touch is grossly intact distally. Motor function distally is 5/5. No foot drop. 2+ dorsalis pedis pulse. Capillary refill is less than 2 seconds. No cyanosis.    Primary Orthopedic Assessment:  • s/p RIGHT hip hemiarthroplasty, POD#2    Secondary  Orthopedic Assessment(s):   •     Secondary  Medical Assessment(s):   •     Plan:   • DVT prophylaxis as prescribed, including use of compression devices and ankle pumps  • Continue physical therapy,   • Weightbearing as tolerated of the right lower extremity with assistance of a walker and knee immobilizer  • Incentive spirometry encouraged  • Pain control as clinically indicated  •  hip precautions reviewed with patient  • Discharge planning – anticipated discharge is subacute rehabilitation / acute rehabilitation

## 2023-11-30 ENCOUNTER — TRANSCRIPTION ENCOUNTER (OUTPATIENT)
Age: 83
End: 2023-11-30

## 2023-11-30 VITALS
TEMPERATURE: 98 F | DIASTOLIC BLOOD PRESSURE: 67 MMHG | HEART RATE: 93 BPM | OXYGEN SATURATION: 96 % | SYSTOLIC BLOOD PRESSURE: 113 MMHG | RESPIRATION RATE: 17 BRPM

## 2023-11-30 PROCEDURE — 99233 SBSQ HOSP IP/OBS HIGH 50: CPT

## 2023-11-30 RX ORDER — ENOXAPARIN SODIUM 100 MG/ML
40 INJECTION SUBCUTANEOUS
Qty: 0 | Refills: 0 | DISCHARGE
Start: 2023-11-30

## 2023-11-30 RX ORDER — TRAMADOL HYDROCHLORIDE 50 MG/1
1 TABLET ORAL
Qty: 0 | Refills: 0 | DISCHARGE
Start: 2023-11-30

## 2023-11-30 RX ORDER — TRAMADOL HYDROCHLORIDE 50 MG/1
2 TABLET ORAL
Qty: 0 | Refills: 0 | DISCHARGE
Start: 2023-11-30

## 2023-11-30 RX ORDER — ACETAMINOPHEN 500 MG
3 TABLET ORAL
Qty: 0 | Refills: 0 | DISCHARGE
Start: 2023-11-30

## 2023-11-30 RX ORDER — ONDANSETRON 8 MG/1
4 TABLET, FILM COATED ORAL
Qty: 0 | Refills: 0 | DISCHARGE
Start: 2023-11-30

## 2023-11-30 RX ORDER — MULTIVIT WITH MIN/MFOLATE/K2 340-15/3 G
296 POWDER (GRAM) ORAL ONCE
Refills: 0 | Status: COMPLETED | OUTPATIENT
Start: 2023-11-30 | End: 2023-11-30

## 2023-11-30 RX ORDER — SENNA PLUS 8.6 MG/1
2 TABLET ORAL
Qty: 0 | Refills: 0 | DISCHARGE
Start: 2023-11-30

## 2023-11-30 RX ORDER — POLYETHYLENE GLYCOL 3350 17 G/17G
17 POWDER, FOR SOLUTION ORAL
Qty: 0 | Refills: 0 | DISCHARGE
Start: 2023-11-30

## 2023-11-30 RX ORDER — MAGNESIUM HYDROXIDE 400 MG/1
30 TABLET, CHEWABLE ORAL
Qty: 0 | Refills: 0 | DISCHARGE
Start: 2023-11-30

## 2023-11-30 RX ADMIN — TRAMADOL HYDROCHLORIDE 100 MILLIGRAM(S): 50 TABLET ORAL at 09:10

## 2023-11-30 RX ADMIN — Medication 10 MILLIGRAM(S): at 11:55

## 2023-11-30 RX ADMIN — ENOXAPARIN SODIUM 40 MILLIGRAM(S): 100 INJECTION SUBCUTANEOUS at 05:43

## 2023-11-30 RX ADMIN — MAGNESIUM HYDROXIDE 30 MILLILITER(S): 400 TABLET, CHEWABLE ORAL at 09:49

## 2023-11-30 RX ADMIN — TRAMADOL HYDROCHLORIDE 100 MILLIGRAM(S): 50 TABLET ORAL at 08:38

## 2023-11-30 RX ADMIN — Medication 975 MILLIGRAM(S): at 06:03

## 2023-11-30 RX ADMIN — Medication 975 MILLIGRAM(S): at 05:44

## 2023-11-30 RX ADMIN — MUPIROCIN 1 APPLICATION(S): 20 OINTMENT TOPICAL at 05:43

## 2023-11-30 NOTE — PROGRESS NOTE ADULT - SUBJECTIVE AND OBJECTIVE BOX
Ortho PA note addendum:     Called by Rn regarding patient's lack of BM. Patient according to chart has refused all standing ordered doses of Miralax and Senna. Patient states she only wants Milk of magnesia.  Patient is scheduled for [facility] today and is required to have BM prior to DC as per [facility].   I ordered suppository and MagCitrate solution.

## 2023-11-30 NOTE — DISCHARGE NOTE NURSING/CASE MANAGEMENT/SOCIAL WORK - PATIENT PORTAL LINK FT
You can access the FollowMyHealth Patient Portal offered by [facility] by registering at the following website: [email or web address]. By joining [facility]’s FollowMyHealth portal, you will also be able to view your health information using other applications (apps) compatible with our system.

## 2023-11-30 NOTE — PROGRESS NOTE ADULT - SUBJECTIVE AND OBJECTIVE BOX
RODRICK COSTELLO    [id number]    History: Patient is an 83F status post right hip mahesh-arthroplasty on 11/27/23. Patient is doing well. The patient's pain is controlled using the prescribed pain medications. The patient is participating in physical therapy and pending [facility] placement. Denies nausea, vomiting, chest pain, shortness of breath, abdominal pain or fever. No new complaints.                               11.6   9.58  )-----------( 172      ( 28 Nov 2023 17:42 )             34.3     11-29    133<L>  |  x   |  x   ----------------------------<  x   x    |  x   |  x     Ca    8.2<L>      28 Nov 2023 17:42        MEDICATIONS  (STANDING):  acetaminophen     Tablet .. 975 milliGRAM(s) Oral every 8 hours  acetaminophen   IVPB .. 1000 milliGRAM(s) IV Intermittent once  ceFAZolin  Injectable. 2000 milliGRAM(s) IV Push once  enoxaparin Injectable 40 milliGRAM(s) SubCutaneous every 24 hours  influenza  Vaccine (HIGH DOSE) 0.7 milliLiter(s) IntraMuscular once  multivitamin 1 Tablet(s) Oral daily  mupirocin 2% Ointment 1 Application(s) Both Nostrils two times a day  polyethylene glycol 3350 17 Gram(s) Oral at bedtime  povidone iodine 5% Nasal Swab 1 Application(s) Both Nostrils once  senna 2 Tablet(s) Oral at bedtime  sodium chloride 0.9%. 1000 milliLiter(s) (100 mL/Hr) IV Continuous <Continuous>  vancomycin  IVPB 1000 milliGRAM(s) IV Intermittent once    MEDICATIONS  (PRN):  HYDROmorphone  Injectable 0.5 milliGRAM(s) IV Push every 3 hours PRN breakthrough pain  magnesium hydroxide Suspension 30 milliLiter(s) Oral daily PRN Constipation  ondansetron Injectable 4 milliGRAM(s) IV Push every 6 hours PRN Nausea and/or Vomiting  traMADol 50 milliGRAM(s) Oral every 4 hours PRN Moderate Pain (4 - 6)  traMADol 100 milliGRAM(s) Oral every 6 hours PRN Severe Pain (7 - 10)  traMADol 50 milliGRAM(s) Oral every 6 hours PRN Mild Pain (1 - 3)      Physical exam: Leg lengths grossly equal. The right hip dressing is saturated with serosanguinous drainage. The calf is supple/nontender. Sensation to light touch is grossly intact distally. Motor function distally is grossly intact. No foot drop. 2+ dorsalis pedis pulse. Capillary refill is less than 2 seconds. No cyanosis.    Primary Orthopedic Assessment:  • s/p RIGHT hip hemiarthroplasty.     Plan:   • DVT prophylaxis as prescribed.  • Continue physical therapy.  • Weightbearing as tolerated in knee immobilizer.    - Posterior hip precautions.   • Incentive spirometry encouraged.  • Pain control as clinically indicated  • Dressing changed.   • Discharge planning – anticipated discharge is [facility].

## 2023-11-30 NOTE — PROGRESS NOTE ADULT - SUBJECTIVE AND OBJECTIVE BOX
Patient distressed about her pain and wants to get to rehab.  Discussed that needs to have pain more controlled to demonstrate progress, especially with ambulation.   Attempted emotional support.    FUNCTIONAL PROGRESS  11/29 PT  Bed Mobility  Bed Mobility Training Rehab Potential: good, to achieve stated therapy goals  Bed Mobility Training Symptoms Noted During/After Treatment: fatigue  Bed Mobility Training Sit-to-Supine: moderate assist (50% patient effort);  verbal cues;  nonverbal cues (demo/gestures);  1 person assist;  head of the bed elevated ;  bed rails  Bed Mobility Training Limitations: decreased ability to use arms for pushing/pulling;  decreased ability to use legs for bridging/pushing;  decreased flexibility;  decreased strength;  pain    Sit-Stand Transfer Training  Sit-to-Stand Transfer Training Rehab Potential: good, to achieve stated therapy goals  Sit-to-Stand Transfer Training Symptoms Noted During/After Treatment: fatigue  Transfer Training Sit-to-Stand Transfer: moderate assist (50% patient effort);  1 person assist;  nonverbal cues (demo/gestures);  verbal cues;  weight-bearing as tolerated   right LE    rolling walker;  right LE (+) knee immobilizer   Transfer Training Stand-to-Sit Transfer: moderate assist (50% patient effort);  1 person assist;  nonverbal cues (demo/gestures);  verbal cues;  assistance needed to extended right LE (+) knee immobilizer prior sitting ;  weight-bearing as tolerated   right LE    rolling walker  Sit-to-Stand Transfer Training Transfer Safety Analysis: decreased sequencing ability;  decreased weight-shifting ability;  right LE ;  decreased balance;  decreased strength;  impaired balance;  pain;  right LE     Gait Training  Gait Training Rehab Potential: good, to achieve stated therapy goals  Gait Training Symptoms Noted During/After Treatment: fatigue  Gait Training: moderate assist (50% patient effort);  1 person assist;  nonverbal cues (demo/gestures);  verbal cues;  weight-bearing as tolerated   right LE (+) knee immobilizer    rolling walker;  5 feet  Gait Analysis: step to gait pattern, (+) antalgic gait;  decreased flexibility;  decreased strength;  impaired balance;  pain;  right LE   Brace/Orthotics Brace/Orthotics: knee immobilizer;  right LE     11/28 OT  Bathing Training:     · Level of [place]	moderate assist (50% patients effort)  · Physical Assist/Nonphysical Assist	1 person assist    Upper Body Dressing Training:     · Level of [place]	minimum assist (75% patients effort); to don gown  · Physical Assist/Nonphysical Assist	1 person assist    Lower Body Dressing Training:     · Level of [place]	dependent (less than 25% patients effort); to don socks    Toilet Hygiene Training:     · Level of [place]	dependent (less than 25% patients effort); secondary to prima fit    Grooming Training:     · Level of [place]	supervision  · Physical Assist/Nonphysical Assist	supervision    Eating/Self-Feeding Training:     · Level of [place]	independent    VITALS  T(C): 36.6 (11-30-23 @ 04:04), Max: 36.7 (11-29-23 @ 13:39)  HR: 82 (11-30-23 @ 04:04) (72 - 94)  BP: 151/78 (11-30-23 @ 04:04) (128/64 - 151/78)  RR: 18 (11-30-23 @ 04:04) (18 - 18)  SpO2: 93% (11-30-23 @ 04:04) (92% - 97%)  Wt(kg): --    MEDICATIONS   acetaminophen     Tablet .. 975 milliGRAM(s) every 8 hours  acetaminophen   IVPB .. 1000 milliGRAM(s) once  ceFAZolin  Injectable. 2000 milliGRAM(s) once  enoxaparin Injectable 40 milliGRAM(s) every 24 hours  HYDROmorphone  Injectable 0.5 milliGRAM(s) every 3 hours PRN  influenza  Vaccine (HIGH DOSE) 0.7 milliLiter(s) once  magnesium hydroxide Suspension 30 milliLiter(s) daily PRN  multivitamin 1 Tablet(s) daily  mupirocin 2% Ointment 1 Application(s) two times a day  ondansetron Injectable 4 milliGRAM(s) every 6 hours PRN  polyethylene glycol 3350 17 Gram(s) at bedtime  povidone iodine 5% Nasal Swab 1 Application(s) once  senna 2 Tablet(s) at bedtime  sodium chloride 0.9%. 1000 milliLiter(s) <Continuous>  traMADol 50 milliGRAM(s) every 4 hours PRN  traMADol 100 milliGRAM(s) every 6 hours PRN  traMADol 50 milliGRAM(s) every 6 hours PRN  vancomycin  IVPB 1000 milliGRAM(s) once      RECENT LABS/IMAGING  - Reviewed Today                        11.6   9.58  )-----------( 172      ( 28 Nov 2023 17:42 )             34.3     11-29    133<L>  |  x   |  x   ----------------------------<  x   x    |  x   |  x     Ca    8.2<L>      28 Nov 2023 17:42        Urinalysis Basic - ( 28 Nov 2023 17:42 )    Color: x / Appearance: x / SG: x / pH: x  Gluc: 104 mg/dL / Ketone: x  / Bili: x / Urobili: x   Blood: x / Protein: x / Nitrite: x   Leuk Esterase: x / RBC: x / WBC x   Sq Epi: x / Non Sq Epi: x / Bacteria: x            CT pelvis 11/26 - There is an acute displaced right femoral neck fracture which extends from the subcapital region posteriorly to the mid cervical region anteriorly. There is superior displacement of the femoral shaft and apex   anterior angulation. There are no other acute fractures. There are no advanced arthritic changes at the hips. There is lower lumbar spondylosis.There is lower lumbar spondylosis. There are scattered enthesopathic bony productive changes. There are vascular calcifications.    XR Right femur 11/27 - Right hip replacement.    ----------------------------------------------------------------------------------------  PHYSICAL EXAM  Constitutional - NAD, Uncomfortable  Extremities - Right knee immobilizer   Neurologic Exam -                    Cognitive - AAO to self, place, date, year, situation     Motor - BLE limited by pain     Sensory - Intact to LT  Psychiatric - Anxious  ----------------------------------------------------------------------------------------  ASSESSMENT/PLAN  83yFemale with functional deficits after a fall sustaining a hip fracture  Right femoral neck fracture s/p hemiarthroplasty - Posterior precautions, ?Knee immobilizer?? use, WBAT  Pain - Tylenol, Dilaudid IV, Tramadol - recommend Pain Management if uncontrolled  DVT PPX - SCDs, Lovenox  Rehab/Impaired mobility and function - Patient continues to require hospitalization for the above diagnoses and ongoing active management of comorbid complications (IV medications, limited mobility due to pain) that are substantially impairing functional ability and impairing quality of life necessitating ongoing medical management of these complications.     Patient would benefit from AR, however, patient is significantly limited by pain which if better controlled would functionally improve and demonstrate potential for acceptance to AR.     Will continue to follow. Rehab recommendations are dependent on how functional progress changes as well as how patient continues to participate and tolerate therapeutic interventions, which may change. Recommend ongoing mobilization by staff to maintain cardiopulmonary function and prevention of secondary complications related to debility. Discussed the specific management and recommendations above with rehab clinical care team/rehab liaison.

## 2023-11-30 NOTE — PROGRESS NOTE ADULT - PROVIDER SPECIALTY LIST ADULT
Orthopedics
Rehab Medicine
Hospitalist
Hospitalist
Orthopedics
Rehab Medicine

## 2023-11-30 NOTE — DISCHARGE NOTE NURSING/CASE MANAGEMENT/SOCIAL WORK - NSDCPEFALRISK_GEN_ALL_CORE
For information on Fall & Injury Prevention, visit: [email or web address] OR  [email or web address] OR  https://www.cdc.gov/steadi/patient.html

## 2023-11-30 NOTE — DISCHARGE NOTE NURSING/CASE MANAGEMENT/SOCIAL WORK - NSDCPELOVENOXDIET_GEN_ALL_CORE
Eat healthy foods you enjoy. Enoxaparin/Lovenox DOES NOT have a special diet. Limit your alcohol intake.
Language barrier to communication

## 2023-12-13 PROCEDURE — 83735 ASSAY OF MAGNESIUM: CPT

## 2023-12-13 PROCEDURE — 96376 TX/PRO/DX INJ SAME DRUG ADON: CPT

## 2023-12-13 PROCEDURE — 99285 EMERGENCY DEPT VISIT HI MDM: CPT | Mod: 25

## 2023-12-13 PROCEDURE — 85027 COMPLETE CBC AUTOMATED: CPT

## 2023-12-13 PROCEDURE — 97167 OT EVAL HIGH COMPLEX 60 MIN: CPT

## 2023-12-13 PROCEDURE — 83880 ASSAY OF NATRIURETIC PEPTIDE: CPT

## 2023-12-13 PROCEDURE — 84484 ASSAY OF TROPONIN QUANT: CPT

## 2023-12-13 PROCEDURE — 86850 RBC ANTIBODY SCREEN: CPT

## 2023-12-13 PROCEDURE — 85610 PROTHROMBIN TIME: CPT

## 2023-12-13 PROCEDURE — 96375 TX/PRO/DX INJ NEW DRUG ADDON: CPT

## 2023-12-13 PROCEDURE — 86901 BLOOD TYPING SEROLOGIC RH(D): CPT

## 2023-12-13 PROCEDURE — 84100 ASSAY OF PHOSPHORUS: CPT

## 2023-12-13 PROCEDURE — 96374 THER/PROPH/DIAG INJ IV PUSH: CPT

## 2023-12-13 PROCEDURE — 83036 HEMOGLOBIN GLYCOSYLATED A1C: CPT

## 2023-12-13 PROCEDURE — 80053 COMPREHEN METABOLIC PANEL: CPT

## 2023-12-13 PROCEDURE — 80048 BASIC METABOLIC PNL TOTAL CA: CPT

## 2023-12-13 PROCEDURE — 73552 X-RAY EXAM OF FEMUR 2/>: CPT

## 2023-12-13 PROCEDURE — 71045 X-RAY EXAM CHEST 1 VIEW: CPT

## 2023-12-13 PROCEDURE — 93005 ELECTROCARDIOGRAM TRACING: CPT

## 2023-12-13 PROCEDURE — 83935 ASSAY OF URINE OSMOLALITY: CPT

## 2023-12-13 PROCEDURE — 87640 STAPH A DNA AMP PROBE: CPT

## 2023-12-13 PROCEDURE — 85025 COMPLETE CBC W/AUTO DIFF WBC: CPT

## 2023-12-13 PROCEDURE — 73502 X-RAY EXAM HIP UNI 2-3 VIEWS: CPT

## 2023-12-13 PROCEDURE — 83930 ASSAY OF BLOOD OSMOLALITY: CPT

## 2023-12-13 PROCEDURE — 85730 THROMBOPLASTIN TIME PARTIAL: CPT

## 2023-12-13 PROCEDURE — 84295 ASSAY OF SERUM SODIUM: CPT

## 2023-12-13 PROCEDURE — 84300 ASSAY OF URINE SODIUM: CPT

## 2023-12-13 PROCEDURE — 87641 MR-STAPH DNA AMP PROBE: CPT

## 2023-12-13 PROCEDURE — 72192 CT PELVIS W/O DYE: CPT | Mod: MA

## 2023-12-13 PROCEDURE — 36415 COLL VENOUS BLD VENIPUNCTURE: CPT

## 2023-12-13 PROCEDURE — 86900 BLOOD TYPING SEROLOGIC ABO: CPT

## 2023-12-13 PROCEDURE — C1776: CPT

## 2023-12-14 ENCOUNTER — TRANSCRIPTION ENCOUNTER (OUTPATIENT)
Age: 83
End: 2023-12-14

## 2023-12-14 ENCOUNTER — APPOINTMENT (OUTPATIENT)
Dept: ORTHOPEDIC SURGERY | Facility: CLINIC | Age: 83
End: 2023-12-14

## 2023-12-22 ENCOUNTER — APPOINTMENT (OUTPATIENT)
Dept: VASCULAR SURGERY | Facility: CLINIC | Age: 83
End: 2023-12-22
Payer: MEDICARE

## 2023-12-22 VITALS
OXYGEN SATURATION: 95 % | HEART RATE: 101 BPM | DIASTOLIC BLOOD PRESSURE: 81 MMHG | SYSTOLIC BLOOD PRESSURE: 147 MMHG | RESPIRATION RATE: 16 BRPM

## 2023-12-22 DIAGNOSIS — I80.239: ICD-10-CM

## 2023-12-22 PROCEDURE — 93971 EXTREMITY STUDY: CPT

## 2023-12-22 PROCEDURE — 99203 OFFICE O/P NEW LOW 30 MIN: CPT

## 2023-12-26 ENCOUNTER — TRANSCRIPTION ENCOUNTER (OUTPATIENT)
Age: 83
End: 2023-12-26

## 2023-12-26 NOTE — ASSESSMENT
[FreeTextEntry1] : Patient with right soleal vein DVT.  Does not appear to have propagated compared to the ultrasound at the rehab.  Given these findings would continue with surveillance I will have the patient follow-up in 1 week with an ultrasound of the right lower extremity she should continue release DVT prophylaxis right now at 40 mg daily of Lovenox.

## 2023-12-26 NOTE — HISTORY OF PRESENT ILLNESS
[FreeTextEntry1] : 83-year-old female status post fall with hip fracture ultimately underwent right hip surgery.  While in [facility] rehab she was diagnosed with a tibial vein DVT this is according to her daughter and the patient.  Based on conversation she had an ultrasound 11th and 13 December showing a venous clot below the knee which was unchanged.  Currently she is on prophylactic dose of Lovenox.  She denies any worsening swelling she does have post traumatic and postsurgery swelling but nothing has changed since.  Denies shortness of breath.

## 2023-12-26 NOTE — PHYSICAL EXAM
[Respiratory Effort] : normal respiratory effort [de-identified] :  appears well  [de-identified] :  Right lower extremity swelling compared to left

## 2023-12-29 ENCOUNTER — APPOINTMENT (OUTPATIENT)
Dept: VASCULAR SURGERY | Facility: CLINIC | Age: 83
End: 2023-12-29
Payer: MEDICARE

## 2023-12-29 VITALS
RESPIRATION RATE: 16 BRPM | SYSTOLIC BLOOD PRESSURE: 171 MMHG | OXYGEN SATURATION: 95 % | DIASTOLIC BLOOD PRESSURE: 78 MMHG | HEART RATE: 89 BPM | TEMPERATURE: 97.5 F

## 2023-12-29 VITALS — DIASTOLIC BLOOD PRESSURE: 77 MMHG | SYSTOLIC BLOOD PRESSURE: 133 MMHG

## 2023-12-29 DIAGNOSIS — I82.449 ACUTE EMBOLISM AND THROMBOSIS OF UNSPECIFIED TIBIAL VEIN: ICD-10-CM

## 2023-12-29 PROCEDURE — 99213 OFFICE O/P EST LOW 20 MIN: CPT

## 2023-12-29 PROCEDURE — 93971 EXTREMITY STUDY: CPT

## 2024-01-02 PROBLEM — I82.449: Status: ACTIVE | Noted: 2023-12-22

## 2024-01-02 NOTE — ASSESSMENT
[FreeTextEntry1] :  patient with a provoked right soleal vein DVT she has been monitored now for the third ultrasound with no change or propagation.  At this point I do not believe she requires anticoagulation or further venous studies.  She was instructed as well as her daughter that if her pain or swelling worsens then she should come in for repeat duplex otherwise requires no further follow-up at this time.

## 2024-01-02 NOTE — PHYSICAL EXAM
[2+] : right 2+ [de-identified] :   Appears well [de-identified] : Right lower extremity slightly larger than the left but otherwise no change

## 2024-01-03 ENCOUNTER — APPOINTMENT (OUTPATIENT)
Dept: CARDIOLOGY | Facility: CLINIC | Age: 84
End: 2024-01-03
Payer: MEDICARE

## 2024-01-03 VITALS
WEIGHT: 127 LBS | HEIGHT: 63 IN | DIASTOLIC BLOOD PRESSURE: 60 MMHG | HEART RATE: 95 BPM | BODY MASS INDEX: 22.5 KG/M2 | SYSTOLIC BLOOD PRESSURE: 148 MMHG | OXYGEN SATURATION: 97 %

## 2024-01-03 PROCEDURE — 99214 OFFICE O/P EST MOD 30 MIN: CPT | Mod: 25

## 2024-01-03 PROCEDURE — 93000 ELECTROCARDIOGRAM COMPLETE: CPT

## 2024-01-03 RX ORDER — ATORVASTATIN CALCIUM 10 MG/1
10 TABLET, FILM COATED ORAL
Qty: 30 | Refills: 0 | Status: DISCONTINUED | COMMUNITY
Start: 2021-04-13 | End: 2024-01-03

## 2024-01-03 NOTE — HISTORY OF PRESENT ILLNESS
[FreeTextEntry1] : Pt is an 82 y/o F who presents today for evaluation.  She is accompanied by her daughter.  She has PMH HLD, RBBB, mild-mod AI, anxiety.    She was in a lot of pain 12/31/2023 from recent R hip surgery (fractured hip) complicated by RLE DVT and went to urgent care.  She was told that her ECG was abnormal and that she should go to the ER.  She did not.  ECG shows NSR with RBBB which is unchanged.  She denies CP, SOB, diaphoresis, palpitations, dizziness, syncope, LE edema, PND, orthopnea.   TTE 01/2021 EF 60-65%, mild MR, mild-mod AI TTE 01/2017 EF 70%, mild DD, mild-mod MR, mild TR stress echo 01/2017 borderline study, possible small region of apical anteroseptal insufficiency montano 05/2021 NSR SVT episodes - longest 12min CUS 04/2021 mild plaque  PMH: HLD, hypothyroidism, GERD PCP Dr Inocente Sanchez Smoking status: never no ETOH no drug use Current exercise: none Daily water intake: 32 oz Daily caffeine intake: none Family hx: father CVA 73, sister colon cancer Previous hospitalizations: 01/2021 allergic reaction, phlebitis 4 children - no problem with pregnancies

## 2024-01-03 NOTE — PHYSICAL EXAM
[Well Developed] : well developed [Well Nourished] : well nourished [No Acute Distress] : no acute distress [Normal Conjunctiva] : normal conjunctiva [Normal Venous Pressure] : normal venous pressure [No Carotid Bruit] : no carotid bruit [Normal S1, S2] : normal S1, S2 [No Murmur] : no murmur [No Rub] : no rub [No Gallop] : no gallop [Clear Lung Fields] : clear lung fields [Good Air Entry] : good air entry [No Respiratory Distress] : no respiratory distress  [Soft] : abdomen soft [Non Tender] : non-tender [No Masses/organomegaly] : no masses/organomegaly [Normal Bowel Sounds] : normal bowel sounds [No Edema] : no edema [No Cyanosis] : no cyanosis [No Clubbing] : no clubbing [No Varicosities] : no varicosities [No Rash] : no rash [No Skin Lesions] : no skin lesions [Moves all extremities] : moves all extremities [No Focal Deficits] : no focal deficits [Normal Speech] : normal speech [Alert and Oriented] : alert and oriented [Normal memory] : normal memory [de-identified] : ambulates with walker

## 2024-01-03 NOTE — DISCUSSION/SUMMARY
[EKG obtained to assist in diagnosis and management of assessed problem(s)] : EKG obtained to assist in diagnosis and management of assessed problem(s) [FreeTextEntry1] : Pt is an 82 y/o F PMH HLD, RBBB, mild-mod AI, anxiety.    TTE 01/2021 EF 60-65%, mild MR, mild-mod AI TTE 01/2017 EF 70%, mild DD, mild-mod MR, mild TR stress echo 01/2017 borderline study, possible small region of apical anteroseptal insufficiency montano 05/2021 NSR SVT episodes - longest 12min CUS 04/2021 mild plaque  RBBB: unchanged repeat TTE get results of recent labs from PCP  HLD: she self d/c'ed Advised lifestyle modifications   The described plan was discussed with the pt and daughter.  All questions and concerns were addressed to the best of my knowledge.

## 2024-01-21 NOTE — ED PROVIDER NOTE - CROS ED RESP ALL NEG
Crys Adames discharged to home accompanied by .   Patient provided with the following educational materials upon discharge:  AVS.   Valuables and belongings sent with patient.   discharge summary, discharge instructions, medications, and follow up appointments reviewed with patient and .  Patient and  verbalized understanding   negative...

## 2024-01-25 ENCOUNTER — APPOINTMENT (OUTPATIENT)
Dept: ORTHOPEDIC SURGERY | Facility: CLINIC | Age: 84
End: 2024-01-25

## 2024-02-09 ENCOUNTER — TRANSCRIPTION ENCOUNTER (OUTPATIENT)
Age: 84
End: 2024-02-09

## 2024-02-12 ENCOUNTER — APPOINTMENT (OUTPATIENT)
Dept: CARDIOLOGY | Facility: CLINIC | Age: 84
End: 2024-02-12

## 2024-02-28 ENCOUNTER — TRANSCRIPTION ENCOUNTER (OUTPATIENT)
Age: 84
End: 2024-02-28

## 2024-03-05 ENCOUNTER — APPOINTMENT (OUTPATIENT)
Dept: ORTHOPEDIC SURGERY | Facility: CLINIC | Age: 84
End: 2024-03-05
Payer: MEDICARE

## 2024-03-05 VITALS
HEART RATE: 91 BPM | BODY MASS INDEX: 22.5 KG/M2 | WEIGHT: 127 LBS | HEIGHT: 63 IN | SYSTOLIC BLOOD PRESSURE: 159 MMHG | DIASTOLIC BLOOD PRESSURE: 75 MMHG

## 2024-03-05 DIAGNOSIS — Z96.649 PRESENCE OF UNSPECIFIED ARTIFICIAL HIP JOINT: ICD-10-CM

## 2024-03-05 PROCEDURE — 99213 OFFICE O/P EST LOW 20 MIN: CPT

## 2024-03-05 PROCEDURE — 73502 X-RAY EXAM HIP UNI 2-3 VIEWS: CPT

## 2024-03-05 NOTE — DISCUSSION/SUMMARY
[Medication Risks Reviewed] : Medication risks reviewed [Surgical risks reviewed] : Surgical risks reviewed [de-identified] : Patient is an 83-year-old female here today for follow-up of her bilateral knee osteoarthritis right worse than left as well as her right hip hemiarthroplasty.  I did discuss with her that it can take 6 months to a year for full recovery after hemiarthroplasty.  I would recommend that she continue with conservative treatment.  We discussed low impact activity and exercise.  Recommend Tylenol as pain.  We did discuss that she may require a conversion total hip arthroplasty in the future if she continues to have severe pain and dysfunction but we will defer this as long as possible.  I will see her back in 3 months for repeat evaluation and x-ray.  As for her right knee we discussed possibly viscosupplementation as it is too soon for another cortisone injection.  She would like to discuss with her daughter prior to having this injection as she states that she had a bad response to a gel injection given by another provider in the past.  She will follow-up with me at her earliest convenience.  All questions were asked and answered

## 2024-03-05 NOTE — HISTORY OF PRESENT ILLNESS
[Pain Location] : pain [Worsening] : worsening [] : right & left knee [Standing] : standing [Constant] : ~He/She~ states the symptoms seem to be constant [Sitting] : worsened by sitting [Running] : worsened by running [Walking] : worsened by walking [Knee Flexion] : worsened with knee flexion [Knee Extension] : worsened with knee extension [de-identified] : going up and down the stairs, rising from a seated position [de-identified] : 83 year old female presents today for follow-up of her right knee pain.  She is status post a right hip hemiarthroplasty performed by another provider approximately 3 months ago.  She states that she is having increasing pain in the right knee.  Was given a cortisone injection by this provider and states it did not help 2 months ago.  States that she is ambulating with use of a walker.  Has severe pain in her groin as well as the lateral aspect of her hip.  States that she has been doing physical therapy.  Is here today to discuss further treatment options

## 2024-03-05 NOTE — ADDENDUM
[FreeTextEntry1] : This note was written by Marisol Hudson, acting as the  for Dr. Chu. This note accurately reflects the work and decisions made by Dr. Chu.

## 2024-03-05 NOTE — PHYSICAL EXAM
[de-identified] : Right knee exam shows moderate effusion, ROM is 5-100 degrees, no instability, no pain with Louis, lateral joint line tenderness. Left knee exam shows no effusion, ROM is 0-1 20 degrees, no instability, no pain with Louis, medial joint line tenderness. 5/5 motor strength in bilateral lower extremities. Sensory: Intact in bilateral lower extremities. DTRs: Biceps, brachioradialis, triceps, patellar, ankle and plantar 2+ and symmetric bilaterally. Pulses: dorsalis pedis, posterior tibial, femoral, popliteal, and radial 2+ and symmetric bilaterally. Right hip: Incision well-healed without erythema drainage or discharge, hip full flexion with 40 degrees of external rotation and 20 degrees of internal rotation with mild pain, 5 out of 5 strength for plantarflexion dorsiflexion [de-identified] : AP pelvis and 2 views of the right hip obtained the office today show no acute fracture or dislocation.  Right hip hemiarthroplasty in appropriate aligned without evidence of fracture dislocation or hardware complication

## 2024-04-11 ENCOUNTER — APPOINTMENT (OUTPATIENT)
Dept: ORTHOPEDIC SURGERY | Facility: CLINIC | Age: 84
End: 2024-04-11
Payer: MEDICARE

## 2024-04-11 DIAGNOSIS — M17.11 UNILATERAL PRIMARY OSTEOARTHRITIS, RIGHT KNEE: ICD-10-CM

## 2024-04-11 PROCEDURE — 20610 DRAIN/INJ JOINT/BURSA W/O US: CPT | Mod: RT

## 2024-04-11 PROCEDURE — 99214 OFFICE O/P EST MOD 30 MIN: CPT | Mod: 25

## 2024-04-11 NOTE — PHYSICAL EXAM
[Antalgic] : antalgic [de-identified] : Right knee exam shows moderate effusion, ROM is 5-100 degrees, no instability, no pain with Louis, lateral joint line tenderness. 5/5 motor strength in bilateral lower extremities. Sensory: Intact in bilateral lower extremities. DTRs: Biceps, brachioradialis, triceps, patellar, ankle and plantar 2+ and symmetric bilaterally. Pulses: dorsalis pedis, posterior tibial, femoral, popliteal, and radial 2+ and symmetric bilaterally.

## 2024-04-11 NOTE — PROCEDURE
[de-identified] : I injected the right knee. I discussed at length with the patient the planned steroid and lidocaine injection. The risks, benefits, convalescence and alternatives were reviewed. The possible side effects discussed included but were not limited to: pain, swelling, heat, bleeding, and redness. Symptoms are generally mild but if they are extensive then contact the office. Giving pain relievers by mouth such as NSAIDs or Tylenol can generally treat the reactions to steroid and lidocaine. Rare cases of infection have been noted. Rash, hives and itching may occur post injection. If you have muscle pain or cramps, flushing and or swelling of the face, rapid heart beat, nausea, dizziness, fever, chills, headache, difficulty breathing, swelling in the arms or legs, or have a prickly feeling of your skin, contact a health care provider immediately. Following this discussion, the knee was prepped with Alcohol and under sterile condition the 80 mg Depo-Medrol and 6 cc Lidocaine injection was performed with a 20 gauge needle through a superolateral injection site. The needle was introduced into the joint, aspiration was performed to ensure intra-articular placement and the medication was injected. Upon withdrawal of the needle the site was cleaned with alcohol and a band aid applied. The patient tolerated the injection well and there were no adverse effects. Post injection instructions included no strenuous activity for 24 hours, cryotherapy and if there are any adverse effects to contact the office.

## 2024-04-11 NOTE — DISCUSSION/SUMMARY
[Medication Risks Reviewed] : Medication risks reviewed [Surgical risks reviewed] : Surgical risks reviewed [de-identified] : 84-year-old female presents to the office for follow-up of severe right knee osteoarthritis.  She is interested in continuing conservative therapy as she has hemiarthroplasty in her right hip and is waiting for that to heal before considering any surgical intervention.  We did discuss various types of injections to receive in the knee.  Has had bad experience with viscosupplementation in the past and would like to proceed with a cortisone injection instead.  I have given her injection of cortisone to the right knee which she tolerated well.  I recommend she continue with physical therapy.  She may continue to take Tylenol as needed for pain as she is unable to take NSAIDs due to an allergy.  She should follow-up in 3 months for repeat evaluation of her right knee.  All questions were addressed, the patient verbalized understanding and is in agreement with the plan.

## 2024-04-11 NOTE — HISTORY OF PRESENT ILLNESS
[Worsening] : worsening [Standing] : standing [Daily] : ~He/She~ states the symptoms seem to be occuring daily [Sitting] : worsened by sitting [Walking] : worsened by walking [NSAIDs] : relieved by nonsteroidal anti-inflammatory drugs [Physical Therapy] : relieved by physical therapy [de-identified] : 84-year-old female presents to the office for follow-up of right knee pain.  She is here today after providing records for previous series of injections from 2023.  Patient received viscosupplementation at that time and stated that it worsened her pain.  She has also had Zilretta injections in the past which worked but only lasted for a few weeks. She states that the pain in her right knee is persistent, it occurs daily and is exacerbated by walking, rising from a seated position, standing for long peers of time and sitting for long periods of time.  Currently ambulating without the use of assistive device.  Currently ambulating without the use of assistive device.  Is here today to discuss various injection options for her right knee. Has been attending PT. Denies any recent injuries or falls, numbness/tingling in lower extremity, significant changes to her medical history since her last visit. Take Tylenol for pain as she is unable to take NSAIDs due to an allergy.

## 2024-04-15 ENCOUNTER — APPOINTMENT (OUTPATIENT)
Dept: UROLOGY | Facility: CLINIC | Age: 84
End: 2024-04-15
Payer: MEDICARE

## 2024-04-15 VITALS
DIASTOLIC BLOOD PRESSURE: 75 MMHG | WEIGHT: 135 LBS | HEART RATE: 70 BPM | HEIGHT: 63 IN | SYSTOLIC BLOOD PRESSURE: 181 MMHG | BODY MASS INDEX: 23.92 KG/M2

## 2024-04-15 DIAGNOSIS — N39.0 URINARY TRACT INFECTION, SITE NOT SPECIFIED: ICD-10-CM

## 2024-04-15 PROCEDURE — 99203 OFFICE O/P NEW LOW 30 MIN: CPT

## 2024-04-22 ENCOUNTER — APPOINTMENT (OUTPATIENT)
Dept: UROLOGY | Facility: CLINIC | Age: 84
End: 2024-04-22

## 2024-04-22 NOTE — HISTORY OF PRESENT ILLNESS
[FreeTextEntry1] : 11/2023 had pelvic surgery due to pelvic fracture ever since with significant incontinence  mostly urge incontinence  now also concerned with dysuria and burning sensation, no fever, no flank pain  discussed possible UTI also discussed trying oxybutynin or VESIcare for now, patient prefers to treat the dysuria first will wait with cystoscopy, since she may need to see uro-gynecology as well  PVR today small  plan: - urine culture - abx - renal ultrasound  - consult with uro-gynecology - TTM next week to confirm improvement in symptoms

## 2024-04-22 NOTE — ASSESSMENT
[FreeTextEntry1] :  plan: - urine culture - abx - renal ultrasound  - consult with uro-gynecology - TTM next week to confirm improvement in symptoms

## 2024-04-23 ENCOUNTER — APPOINTMENT (OUTPATIENT)
Dept: UROGYNECOLOGY | Facility: CLINIC | Age: 84
End: 2024-04-23
Payer: MEDICARE

## 2024-04-23 VITALS
BODY MASS INDEX: 23.92 KG/M2 | HEIGHT: 63 IN | WEIGHT: 135 LBS | DIASTOLIC BLOOD PRESSURE: 72 MMHG | SYSTOLIC BLOOD PRESSURE: 209 MMHG

## 2024-04-23 DIAGNOSIS — R10.2 PELVIC AND PERINEAL PAIN: ICD-10-CM

## 2024-04-23 DIAGNOSIS — Z78.9 OTHER SPECIFIED HEALTH STATUS: ICD-10-CM

## 2024-04-23 DIAGNOSIS — N95.2 POSTMENOPAUSAL ATROPHIC VAGINITIS: ICD-10-CM

## 2024-04-23 DIAGNOSIS — Z82.3 FAMILY HISTORY OF STROKE: ICD-10-CM

## 2024-04-23 PROCEDURE — 81003 URINALYSIS AUTO W/O SCOPE: CPT | Mod: QW

## 2024-04-23 PROCEDURE — 99459 PELVIC EXAMINATION: CPT

## 2024-04-23 PROCEDURE — 99204 OFFICE O/P NEW MOD 45 MIN: CPT

## 2024-04-23 NOTE — REASON FOR VISIT
[Urinary Incontinence] : urinary incontinence [Urine Frequency] : urine frequency [Recurrent Urinary Infections] : recurrent urinary infections

## 2024-04-23 NOTE — HISTORY OF PRESENT ILLNESS
[FreeTextEntry1] : 83 yo  female with complaints of leaking of urine. She had hip replaced and went to rehab and after that she developed significant leaking with urgency. She had some issues with leaking in the past but after the hip surgery in November it got much worse. This happens mostly at night. She gets up 4 times at night and will leka on the wya to the bathroom. During the day she can control it although she is wearing pads all the item. She denies leaking with sneeze or cough. Last week [id number] some discomfort with urination and was given bactrim and she feels better but iot has not fully resolved. She has significant groin pain on the side that she had hip replaced and she is working with the Ortho to figure that out. She feels she empties fully. She has not bowel complaints, no accidents or soiling. She denies any prolapse. We discussed her needing a primary care doctor to follow her medical issues.

## 2024-04-23 NOTE — OB HISTORY
[Vaginal ___] : [unfilled] vaginal delivery(s) [ ___] : [unfilled]  section delivery(s) [Approximately ___ (Month)] : the LMP was approximately [unfilled] month(s) ago [Last Pap Smear ___] : date of last pap smear was on [unfilled] [Abnormal Pap Smear] : normal pap smear [Taking Estrogens] : is not taking estrogen replacement [Sexually Active] : is not sexually active [FreeTextEntry1] : Largest baby 7#

## 2024-04-23 NOTE — LETTER BODY
[Dear  ___] : Dear  [unfilled], [I had the pleasure of evaluating your patient, [unfilled]. Thank you for referring Ms. [unfilled] for consultation for ___] : I had the pleasure of evaluating your patient, [unfilled]. Thank you for referring Ms. [unfilled] for consultation for [unfilled]. [Attached please find my note.] : Attached please find my note. [Thank you very much for allowing me to participate in the care of this patient. If you have any questions, please do not hesitate to contact me] : Thank you very much for allowing me to participate in the care of this patient. If you have any questions, please do not hesitate to contact me. [DrChani  ___] : Dr. JONES

## 2024-04-23 NOTE — PHYSICAL EXAM
[Chaperone Present] : A chaperone was present in the examining room during all aspects of the physical examination [[id number]] : A chaperone was present during the pelvic exam. [No Acute Distress] : in no acute distress [Well developed] : well developed [Well Nourished] : ~L well nourished [Oriented x3] : oriented to person, place, and time [No Edema] : ~T edema was not present [Scar] : a scar was noted [Warm and Dry] : was warm and dry to touch [Normal Gait] : gait was normal [Normal Appearance] : general appearance was normal [Atrophy] : atrophy [1] : 1 [Aa ____] : Aa [unfilled] [Ba ____] : Ba [unfilled] [C ____] : C [unfilled] [GH ____] : GH [unfilled] [PB ____] : PB [unfilled] [TVL ____] : TVL  [unfilled] [Ap ____] : Ap [unfilled] [Bp ____] : Bp [unfilled] [D ____] : D [unfilled] [Normal] : no abnormalities [Post Void Residual ____ml] : post void residual was [unfilled] ml [FreeTextEntry2] : Meghan [Cough] : no cough [Tenderness] : ~T no ~M abdominal tenderness observed [Distended] : not distended [Hernia] : no hernia observed

## 2024-04-25 LAB
APPEARANCE: ABNORMAL
BACTERIA UR CULT: NORMAL
BACTERIA: NEGATIVE /HPF
BILIRUB UR QL STRIP: NEGATIVE
BILIRUBIN URINE: NEGATIVE
BLOOD URINE: NEGATIVE
CAST: 0 /LPF
COLOR: YELLOW
EPITHELIAL CELLS: 2 /HPF
GLUCOSE QUALITATIVE U: NEGATIVE MG/DL
GLUCOSE UR-MCNC: NEGATIVE
HCG UR QL: 0.2 EU/DL
HGB UR QL STRIP.AUTO: NORMAL
KETONES UR-MCNC: NEGATIVE
KETONES URINE: NEGATIVE MG/DL
LEUKOCYTE ESTERASE UR QL STRIP: NEGATIVE
LEUKOCYTE ESTERASE URINE: NEGATIVE
MICROSCOPIC-UA: NORMAL
NITRITE UR QL STRIP: NEGATIVE
NITRITE URINE: NEGATIVE
PH UR STRIP: 5.5
PH URINE: 5.5
PROT UR STRIP-MCNC: NEGATIVE
PROTEIN URINE: NEGATIVE MG/DL
RED BLOOD CELLS URINE: 0 /HPF
SP GR UR STRIP: 1.02
SPECIFIC GRAVITY URINE: 1.02
URINE CYTOLOGY: NORMAL
UROBILINOGEN URINE: 0.2 MG/DL
WHITE BLOOD CELLS URINE: 0 /HPF

## 2024-05-09 LAB — BACTERIA UR CULT: ABNORMAL

## 2024-05-14 ENCOUNTER — APPOINTMENT (OUTPATIENT)
Dept: UROGYNECOLOGY | Facility: CLINIC | Age: 84
End: 2024-05-14
Payer: MEDICARE

## 2024-05-14 VITALS — SYSTOLIC BLOOD PRESSURE: 165 MMHG | DIASTOLIC BLOOD PRESSURE: 75 MMHG

## 2024-05-14 DIAGNOSIS — R32 UNSPECIFIED URINARY INCONTINENCE: ICD-10-CM

## 2024-05-14 DIAGNOSIS — R39.15 URGENCY OF URINATION: ICD-10-CM

## 2024-05-14 DIAGNOSIS — R35.0 FREQUENCY OF MICTURITION: ICD-10-CM

## 2024-05-14 DIAGNOSIS — R35.1 NOCTURIA: ICD-10-CM

## 2024-05-14 DIAGNOSIS — R30.0 DYSURIA: ICD-10-CM

## 2024-05-14 PROCEDURE — 51798 US URINE CAPACITY MEASURE: CPT

## 2024-05-14 PROCEDURE — 99214 OFFICE O/P EST MOD 30 MIN: CPT

## 2024-05-14 NOTE — DISCUSSION/SUMMARY
[FreeTextEntry1] : Pt reports burning with urination. Offered pt urine testing via catheterization and pt declined. Pt states "that will make me burn more". Pt did not leave a clean catch specimen and states "I just emptied my bladder I cannot go again". PVR 0. Pt requesting to go to a [facility] for urine testing. UA/UC orders sent. Will follow up with results and treat as needed. Pt verbalized understanding. Reviewed with pt the benefits of low dose vaginal estrogen for vaginal atrophy noted at last visit with MD Bravo. Pt will reach out to hematologist for clearance to use vaginal estrogen with recent h/o DVT. We discussed management options for overactive bladder including observation, behavioral modifications and bladder training, physical therapy and medications including anticholinergics and beta 3 agonists.  We discussed additional treatment options including sacral neuromodulation, PTNS and intra detrusor Botox. [facility] handout on PTNS, Botox and SNM given to pt.  Pt states would not like medication at this time and wants a "more natural way" to manage OAB symptoms. Pt did not feel Gemtesa was helpful at all and with recent elevated bps will not start Myrbetriq. Pt aware to F/U with PCP for further evaluation of elevation BPs. Pt states is considering PTNS but would like more time to review handouts. Pt will call our office to let us know how she would like to proceed. All questions answered. Instructed to call with any questions or concerns.

## 2024-05-14 NOTE — HISTORY OF PRESENT ILLNESS
[FreeTextEntry1] : Pt presents to the office today with h/o leaking of urine, urinary urgency and frequency. Pt was last seen in this office on 4/23/24 and was started on Gemtesa 75mg. Pt states took Gemtesa for 10 days with no improvement in symptoms. Pt states "I think my symptoms got worse on the medication". Pt reports once she stopped Gemtesa symptoms seem to improve. Pt states frequency, urgency and UUI during the day is manageable, and is waking up 2x/night which is better than 4x/night on no medication. Pt s/p hip sx in November 2023 and reports urinary symptoms got much worse ever since then. Pt states had a DVT soon after hip sx, was given blood thinners in the hospital and is no longer on blood thinners. Pt received a cortisone injection 3 weeks ago in her knee and states ever since has had fluctuations in her bp. BP elevated today. Pt denies any HA, dizziness, or blurry vision. Denies h/o HTN. Pt reports "I still have burning with urination. I had that the last time I was here as well". Last UC from 4/23/24 negative. Today, denies increase in urinary frequency or urgency. Denies fever, chills, hematuria, or flank pain. Denies sensation of incomplete emptying.  PVR normal today.

## 2024-05-20 ENCOUNTER — NON-APPOINTMENT (OUTPATIENT)
Age: 84
End: 2024-05-20

## 2024-05-20 ENCOUNTER — APPOINTMENT (OUTPATIENT)
Dept: CARDIOLOGY | Facility: CLINIC | Age: 84
End: 2024-05-20
Payer: MEDICARE

## 2024-05-20 VITALS
WEIGHT: 140 LBS | OXYGEN SATURATION: 98 % | DIASTOLIC BLOOD PRESSURE: 68 MMHG | BODY MASS INDEX: 24.8 KG/M2 | HEIGHT: 63 IN | SYSTOLIC BLOOD PRESSURE: 130 MMHG | HEART RATE: 81 BPM

## 2024-05-20 DIAGNOSIS — I45.10 UNSPECIFIED RIGHT BUNDLE-BRANCH BLOCK: ICD-10-CM

## 2024-05-20 DIAGNOSIS — R94.31 ABNORMAL ELECTROCARDIOGRAM [ECG] [EKG]: ICD-10-CM

## 2024-05-20 PROCEDURE — 93242 EXT ECG>48HR<7D RECORDING: CPT

## 2024-05-20 PROCEDURE — 99214 OFFICE O/P EST MOD 30 MIN: CPT | Mod: 25

## 2024-05-20 PROCEDURE — 93000 ELECTROCARDIOGRAM COMPLETE: CPT | Mod: XU

## 2024-05-20 NOTE — PHYSICAL EXAM
[Well Developed] : well developed [Well Nourished] : well nourished [No Acute Distress] : no acute distress [Normal Conjunctiva] : normal conjunctiva [Normal Venous Pressure] : normal venous pressure [No Carotid Bruit] : no carotid bruit [Normal S1, S2] : normal S1, S2 [No Rub] : no rub [No Gallop] : no gallop [Clear Lung Fields] : clear lung fields [Good Air Entry] : good air entry [No Respiratory Distress] : no respiratory distress  [Soft] : abdomen soft [Non Tender] : non-tender [No Masses/organomegaly] : no masses/organomegaly [Normal Bowel Sounds] : normal bowel sounds [No Edema] : no edema [No Cyanosis] : no cyanosis [No Clubbing] : no clubbing [No Varicosities] : no varicosities [No Rash] : no rash [No Skin Lesions] : no skin lesions [Moves all extremities] : moves all extremities [No Focal Deficits] : no focal deficits [Normal Speech] : normal speech [Alert and Oriented] : alert and oriented [Normal memory] : normal memory [de-identified] : +sys murmur [de-identified] : ambulates with walker

## 2024-05-20 NOTE — REVIEW OF SYSTEMS
[Joint Pain] : joint pain [Negative] : Heme/Lymph [SOB] : shortness of breath [Dyspnea on exertion] : not dyspnea during exertion [Chest Discomfort] : no chest discomfort [Lower Ext Edema] : no extremity edema [Leg Claudication] : no intermittent leg claudication [Palpitations] : no palpitations [Orthopnea] : no orthopnea [PND] : no PND [Syncope] : no syncope

## 2024-05-20 NOTE — HISTORY OF PRESENT ILLNESS
[FreeTextEntry1] : Pt is an 83 y/o F PMH HLD, RBBB, mild-mod AI, anxiety.   12/31/2023 R hip surgery (fractured hip) complicated by RLE DVT   She tells me that last night she manually checked her pulse which was 40.  She was feeling SOB last night and dizziness. She states that she fell asleep and is feeling better this morning.   TTE 01/2021 EF 60-65%, mild MR, mild-mod AI TTE 01/2017 EF 70%, mild DD, mild-mod MR, mild TR stress echo 01/2017 borderline study, possible small region of apical anteroseptal insufficiency montano 05/2021 NSR SVT episodes - longest 12min CUS 04/2021 mild plaque  PMH: HLD, hypothyroidism, GERD PCP Dr Inocente Sanchez Smoking status: never no ETOH no drug use Current exercise: none Daily water intake: 32 oz Daily caffeine intake: none Family hx: father CVA 73, sister colon cancer Previous hospitalizations: 01/2021 allergic reaction, phlebitis 4 children - no problem with pregnancies

## 2024-05-20 NOTE — DISCUSSION/SUMMARY
[EKG obtained to assist in diagnosis and management of assessed problem(s)] : EKG obtained to assist in diagnosis and management of assessed problem(s) [FreeTextEntry1] : Pt is an 85 y/o F PMH HLD, RBBB, mild-mod AI, anxiety.  TOday she p/w bradycardia, SOB check montano Montano placed same day as office visit.  Will check transthoracic echocardiogram to evaluate left ventricular function and assess for any structural abnormalities  check pharm nuc stress test to eval for ischemia Advised pt to go to the nearest ED if symptoms persist or worsen   TTE 01/2021 EF 60-65%, mild MR, mild-mod AI TTE 01/2017 EF 70%, mild DD, mild-mod MR, mild TR stress echo 01/2017 borderline study, possible small region of apical anteroseptal insufficiency montano 05/2021 NSR SVT episodes - longest 12min CUS 04/2021 mild plaque  bradycardia at home: check montano   RBBB: unchanged repeat TTE get results of recent labs from PCP  HLD: she self d/c'ed Advised lifestyle modifications   She has appt with Dr Ribera next week - labs to be checked then The described plan was discussed with the pt.  All questions and concerns were addressed to the best of my knowledge.

## 2024-05-31 ENCOUNTER — RESULT REVIEW (OUTPATIENT)
Age: 84
End: 2024-05-31

## 2024-05-31 ENCOUNTER — APPOINTMENT (OUTPATIENT)
Dept: FAMILY MEDICINE | Facility: CLINIC | Age: 84
End: 2024-05-31

## 2024-05-31 ENCOUNTER — INPATIENT (INPATIENT)
Facility: HOSPITAL | Age: 84
LOS: 3 days | Discharge: HOME | DRG: 310 | End: 2024-06-04
Attending: INTERNAL MEDICINE | Admitting: INTERNAL MEDICINE
Payer: MEDICARE

## 2024-05-31 ENCOUNTER — APPOINTMENT (OUTPATIENT)
Dept: FAMILY MEDICINE | Facility: CLINIC | Age: 84
End: 2024-05-31
Payer: MEDICARE

## 2024-05-31 VITALS
WEIGHT: 135 LBS | HEIGHT: 63 IN | DIASTOLIC BLOOD PRESSURE: 72 MMHG | SYSTOLIC BLOOD PRESSURE: 150 MMHG | HEART RATE: 80 BPM | OXYGEN SATURATION: 97 % | BODY MASS INDEX: 23.92 KG/M2 | TEMPERATURE: 97 F

## 2024-05-31 VITALS
OXYGEN SATURATION: 96 % | RESPIRATION RATE: 18 BRPM | HEIGHT: 63 IN | HEART RATE: 48 BPM | SYSTOLIC BLOOD PRESSURE: 204 MMHG | TEMPERATURE: 98 F | DIASTOLIC BLOOD PRESSURE: 74 MMHG | WEIGHT: 144.84 LBS

## 2024-05-31 VITALS — SYSTOLIC BLOOD PRESSURE: 132 MMHG | DIASTOLIC BLOOD PRESSURE: 70 MMHG

## 2024-05-31 DIAGNOSIS — Z13.228 ENCOUNTER FOR SCREENING FOR OTHER SUSPECTED ENDOCRINE DISORDER: ICD-10-CM

## 2024-05-31 DIAGNOSIS — Z13.0 ENCOUNTER FOR SCREENING FOR OTHER SUSPECTED ENDOCRINE DISORDER: ICD-10-CM

## 2024-05-31 DIAGNOSIS — Z98.49 CATARACT EXTRACTION STATUS, UNSPECIFIED EYE: Chronic | ICD-10-CM

## 2024-05-31 DIAGNOSIS — Z98.89 OTHER SPECIFIED POSTPROCEDURAL STATES: Chronic | ICD-10-CM

## 2024-05-31 DIAGNOSIS — K31.89 OTHER DISEASES OF STOMACH AND DUODENUM: ICD-10-CM

## 2024-05-31 DIAGNOSIS — R00.1 BRADYCARDIA, UNSPECIFIED: ICD-10-CM

## 2024-05-31 DIAGNOSIS — R31.29 OTHER MICROSCOPIC HEMATURIA: ICD-10-CM

## 2024-05-31 DIAGNOSIS — Z00.00 ENCOUNTER FOR GENERAL ADULT MEDICAL EXAMINATION W/OUT ABNORMAL FINDINGS: ICD-10-CM

## 2024-05-31 DIAGNOSIS — Z13.29 ENCOUNTER FOR SCREENING FOR OTHER SUSPECTED ENDOCRINE DISORDER: ICD-10-CM

## 2024-05-31 LAB
ALBUMIN SERPL ELPH-MCNC: 3.9 G/DL — SIGNIFICANT CHANGE UP (ref 3.3–5.2)
ALP SERPL-CCNC: 61 U/L — SIGNIFICANT CHANGE UP (ref 40–120)
ALT FLD-CCNC: 13 U/L — SIGNIFICANT CHANGE UP
ANION GAP SERPL CALC-SCNC: 15 MMOL/L — SIGNIFICANT CHANGE UP (ref 5–17)
APPEARANCE: CLEAR
APTT BLD: 30.7 SEC — SIGNIFICANT CHANGE UP (ref 24.5–35.6)
AST SERPL-CCNC: 21 U/L — SIGNIFICANT CHANGE UP
BACTERIA UR CULT: NORMAL
BACTERIA: NEGATIVE /HPF
BASOPHILS # BLD AUTO: 0.03 K/UL — SIGNIFICANT CHANGE UP (ref 0–0.2)
BASOPHILS NFR BLD AUTO: 0.4 % — SIGNIFICANT CHANGE UP (ref 0–2)
BILIRUB SERPL-MCNC: 0.5 MG/DL — SIGNIFICANT CHANGE UP (ref 0.4–2)
BILIRUBIN URINE: NEGATIVE
BLD GP AB SCN SERPL QL: SIGNIFICANT CHANGE UP
BLOOD URINE: ABNORMAL
BUN SERPL-MCNC: 15.7 MG/DL — SIGNIFICANT CHANGE UP (ref 8–20)
CALCIUM SERPL-MCNC: 9.2 MG/DL — SIGNIFICANT CHANGE UP (ref 8.4–10.5)
CAST: 8 /LPF
CHLORIDE SERPL-SCNC: 101 MMOL/L — SIGNIFICANT CHANGE UP (ref 96–108)
CO2 SERPL-SCNC: 23 MMOL/L — SIGNIFICANT CHANGE UP (ref 22–29)
COLOR: NORMAL
CREAT SERPL-MCNC: 0.65 MG/DL — SIGNIFICANT CHANGE UP (ref 0.5–1.3)
EGFR: 86 ML/MIN/1.73M2 — SIGNIFICANT CHANGE UP
EOSINOPHIL # BLD AUTO: 0.13 K/UL — SIGNIFICANT CHANGE UP (ref 0–0.5)
EOSINOPHIL NFR BLD AUTO: 1.6 % — SIGNIFICANT CHANGE UP (ref 0–6)
EPITHELIAL CELLS: 4 /HPF
GLUCOSE QUALITATIVE U: NEGATIVE MG/DL
GLUCOSE SERPL-MCNC: 99 MG/DL — SIGNIFICANT CHANGE UP (ref 70–99)
HCT VFR BLD CALC: 40.7 % — SIGNIFICANT CHANGE UP (ref 34.5–45)
HGB BLD-MCNC: 13.4 G/DL — SIGNIFICANT CHANGE UP (ref 11.5–15.5)
HYALINE CASTS: PRESENT
IMM GRANULOCYTES NFR BLD AUTO: 0.4 % — SIGNIFICANT CHANGE UP (ref 0–0.9)
INR BLD: 0.98 RATIO — SIGNIFICANT CHANGE UP (ref 0.85–1.18)
KETONES URINE: ABNORMAL MG/DL
LEUKOCYTE ESTERASE URINE: NEGATIVE
LYMPHOCYTES # BLD AUTO: 1.44 K/UL — SIGNIFICANT CHANGE UP (ref 1–3.3)
LYMPHOCYTES # BLD AUTO: 17.9 % — SIGNIFICANT CHANGE UP (ref 13–44)
MAGNESIUM SERPL-MCNC: 2 MG/DL — SIGNIFICANT CHANGE UP (ref 1.6–2.6)
MCHC RBC-ENTMCNC: 27.1 PG — SIGNIFICANT CHANGE UP (ref 27–34)
MCHC RBC-ENTMCNC: 32.9 GM/DL — SIGNIFICANT CHANGE UP (ref 32–36)
MCV RBC AUTO: 82.4 FL — SIGNIFICANT CHANGE UP (ref 80–100)
MICROSCOPIC-UA: NORMAL
MONOCYTES # BLD AUTO: 0.72 K/UL — SIGNIFICANT CHANGE UP (ref 0–0.9)
MONOCYTES NFR BLD AUTO: 8.9 % — SIGNIFICANT CHANGE UP (ref 2–14)
NEUTROPHILS # BLD AUTO: 5.71 K/UL — SIGNIFICANT CHANGE UP (ref 1.8–7.4)
NEUTROPHILS NFR BLD AUTO: 70.8 % — SIGNIFICANT CHANGE UP (ref 43–77)
NITRITE URINE: NEGATIVE
NT-PROBNP SERPL-SCNC: 768 PG/ML — HIGH (ref 0–300)
PH URINE: 5.5
PLATELET # BLD AUTO: 212 K/UL — SIGNIFICANT CHANGE UP (ref 150–400)
POTASSIUM SERPL-MCNC: 4.4 MMOL/L — SIGNIFICANT CHANGE UP (ref 3.5–5.3)
POTASSIUM SERPL-SCNC: 4.4 MMOL/L — SIGNIFICANT CHANGE UP (ref 3.5–5.3)
PROT SERPL-MCNC: 7 G/DL — SIGNIFICANT CHANGE UP (ref 6.6–8.7)
PROTEIN URINE: 30 MG/DL
PROTHROM AB SERPL-ACNC: 10.9 SEC — SIGNIFICANT CHANGE UP (ref 9.5–13)
RAPID RVP RESULT: SIGNIFICANT CHANGE UP
RBC # BLD: 4.94 M/UL — SIGNIFICANT CHANGE UP (ref 3.8–5.2)
RBC # FLD: 14.6 % — HIGH (ref 10.3–14.5)
RED BLOOD CELLS URINE: 3 /HPF
REVIEW: NORMAL
SARS-COV-2 RNA SPEC QL NAA+PROBE: SIGNIFICANT CHANGE UP
SARS-COV-2 RNA SPEC QL NAA+PROBE: SIGNIFICANT CHANGE UP
SODIUM SERPL-SCNC: 139 MMOL/L — SIGNIFICANT CHANGE UP (ref 135–145)
SPECIFIC GRAVITY URINE: 1.03
T4 AB SER-ACNC: 6.4 UG/DL — SIGNIFICANT CHANGE UP (ref 4.5–12)
TROPONIN T, HIGH SENSITIVITY RESULT: 20 NG/L — SIGNIFICANT CHANGE UP (ref 0–51)
TROPONIN T, HIGH SENSITIVITY RESULT: 21 NG/L — SIGNIFICANT CHANGE UP (ref 0–51)
TSH SERPL-MCNC: 7.3 UIU/ML — HIGH (ref 0.27–4.2)
UROBILINOGEN URINE: 1 MG/DL
WBC # BLD: 8.06 K/UL — SIGNIFICANT CHANGE UP (ref 3.8–10.5)
WBC # FLD AUTO: 8.06 K/UL — SIGNIFICANT CHANGE UP (ref 3.8–10.5)
WHITE BLOOD CELLS URINE: 2 /HPF

## 2024-05-31 PROCEDURE — G0439: CPT

## 2024-05-31 PROCEDURE — 93306 TTE W/DOPPLER COMPLETE: CPT | Mod: 26

## 2024-05-31 PROCEDURE — 99223 1ST HOSP IP/OBS HIGH 75: CPT

## 2024-05-31 PROCEDURE — 71045 X-RAY EXAM CHEST 1 VIEW: CPT | Mod: 26

## 2024-05-31 PROCEDURE — G0444 DEPRESSION SCREEN ANNUAL: CPT

## 2024-05-31 PROCEDURE — 99221 1ST HOSP IP/OBS SF/LOW 40: CPT

## 2024-05-31 PROCEDURE — 99285 EMERGENCY DEPT VISIT HI MDM: CPT | Mod: GC

## 2024-05-31 PROCEDURE — 93010 ELECTROCARDIOGRAM REPORT: CPT

## 2024-05-31 RX ORDER — HYDRALAZINE HCL 50 MG
10 TABLET ORAL
Refills: 0 | Status: DISCONTINUED | OUTPATIENT
Start: 2024-05-31 | End: 2024-06-04

## 2024-05-31 RX ORDER — MECLIZINE HCL 12.5 MG
6 TABLET ORAL
Qty: 0 | Refills: 0 | DISCHARGE

## 2024-05-31 RX ORDER — ATROPINE SULFATE 0.1 MG/ML
0.5 SYRINGE (ML) INJECTION ONCE
Refills: 0 | Status: DISCONTINUED | OUTPATIENT
Start: 2024-05-31 | End: 2024-06-04

## 2024-05-31 RX ORDER — HYDRALAZINE HCL 50 MG
50 TABLET ORAL THREE TIMES A DAY
Refills: 0 | Status: DISCONTINUED | OUTPATIENT
Start: 2024-05-31 | End: 2024-06-02

## 2024-05-31 NOTE — ED ADULT NURSE NOTE - NSFALLUNIVINTERV_ED_ALL_ED
Bed/Stretcher in lowest position, wheels locked, appropriate side rails in place/Call bell, personal items and telephone in reach/Instruct patient to call for assistance before getting out of bed/chair/stretcher/Non-slip footwear applied when patient is off stretcher/[place] to call system/Physically safe environment - no spills, clutter or unnecessary equipment/Purposeful proactive rounding/Room/bathroom lighting operational, light cord in reach

## 2024-05-31 NOTE — ASSESSMENT
[FreeTextEntry1] : #HCM - Patient presenting for annual wellness visit and to establish care  - Declines flu vaccine today - Based on USPSTF screening guidelines and shared decision-making with patient, age out of breast, cervical and colon cancer screenings  - Refuses mammogram and colonoscopy in the past  #Complete heart block - During the physical examination, patient received a phone call from her cardiologist Dr. Jones, explaining the results of her Holter Monitor which revealed instances of complete heart block - Discussed in detail with patient and cardiologist that this is medical emergency that requires urgent ER evaluation for PPM placement. All questions answered. Patient will present to [facility], cardiologist calling ahead  - Hold bloodwork today as will obtain labs in [facility] ER - Patient in agreement with plan

## 2024-05-31 NOTE — CONSULT NOTE ADULT - NS ATTEND AMEND GEN_ALL_CORE FT
84 year old female presenting with symptomatic heart block. Transient complete heart block on outpatient monitoring and on arrival to the ER noted to have 2:1 AV block with a RBBB. No clear reversible etiology. Plan for dual chamber PPM with LBBa pacing. Obtain TTE.
as above, symptomatic 2:1 AVB, no obvious inciting events, TTE pending, EP consulted re. PPM eval.

## 2024-05-31 NOTE — PATIENT PROFILE ADULT - FUNCTIONAL ASSESSMENT - DAILY ACTIVITY 4.
4 = No assist / stand by assistance Rhombic Flap Text: The defect edges were debeveled with a #15 scalpel blade.  Given the location of the defect and the proximity to free margins a rhombic flap was deemed most appropriate.  Using a sterile surgical marker, an appropriate rhombic flap was drawn incorporating the defect.    The area thus outlined was incised deep to adipose tissue with a #15 scalpel blade.  The skin margins were undermined to an appropriate distance in all directions utilizing iris scissors.

## 2024-05-31 NOTE — ED PROVIDER NOTE - PROGRESS NOTE DETAILS
JK - labs WNL; Will admit to medicine for PPM vs. AICD pending TTE. Cardiology/EP following. VSS resting comfortably.

## 2024-05-31 NOTE — ED ADULT TRIAGE NOTE - CHIEF COMPLAINT QUOTE
dgtr states pt was sent by cardiologist stating her heart monitor showing CHB, wanted her to come for evaluation of pacemaker  A&Ox3, resp wnl, recent Hip replacement 11/3/23

## 2024-05-31 NOTE — HISTORY OF PRESENT ILLNESS
[FreeTextEntry1] : Establish care, AWV [de-identified] : 85yo female with PMH of HLD, Hashimoto's thyroiditis, RBBB, anxiety, GERD, history of provoked DVT following hip surgery who presents to the office for annual wellness visit and to establish care.  Previously followed with Dr. Sanchez, [facility]  Recently saw cardiology Dr. Jones on 5/20/24. She was complaining of low heart rates of 40 and shortness of breath. A Holter monitor was placed and TTE, nuclear stress testing were ordered.  At the appointment, she received her results from her cardiologist with her Holter monitor results which did show evidence of complete heart block.  She self-discontinued her statin.   Has been following with GYN due to complaints of leaking of urine and increased urinary frequency and urgency. She was started on Gemtesa to treat suspect OAB, but symptoms worsened. She was seen for a follow up but declined further medications. She had a repeat urinalysis which showed microscopic hematuria. She was asked to follow up to schedule a cystoscopy.   In 11/2023, patient suffered a fall leading to a right hip fracture s/p right hip hemiarthroplasty at [facility]. and underwent surgery at [facility]. [facility] She unfortunately sustained a tibial vein DVT and was treated with AC for provoked DVT, now off.   History of Hashimoto's thyroiditis, not on thyroid replacement.

## 2024-05-31 NOTE — H&P ADULT - NSHPPHYSICALEXAM_GEN_ALL_CORE
ICU Vital Signs Last 24 Hrs  T(C): 36.7 (31 May 2024 14:09), Max: 36.7 (31 May 2024 14:09)  T(F): 98 (31 May 2024 14:09), Max: 98 (31 May 2024 14:09)  HR: 46 (31 May 2024 14:20) (46 - 48)  BP: 191/50 (31 May 2024 14:20) (191/50 - 204/74)  BP(mean): --  ABP: --  ABP(mean): --  RR: 18 (31 May 2024 14:20) (18 - 18)  SpO2: 98% (31 May 2024 14:20) (96% - 98%)    O2 Parameters below as of 31 May 2024 14:20  Patient On (Oxygen Delivery Method): room air    Constitutional: AAOx3, No distress, comfortable, Zolle pads on  Cardiovascular: +S1S2 RRR, no murmurs, rubs, gallops HR now in high 40s  Pulmonary: CTA b/l, unlabored, no wheezes, rales. rhonchi  Abdomen: soft NTND, BS +  Extremities: no edema b/l, +distal pulses b/l  Neuro: non focal, speech clear, URRUTIA x 4  Skin: no rash or lesions  Head: NC AT  ENT: Moist mucous membranes; No JVD

## 2024-05-31 NOTE — CONSULT NOTE ADULT - ASSESSMENT
84F PMH hypothyroidism (on no medications), RBBB, anxiety, R hip fracture s/p right hemiarthroplasty 12/31/23 complicated by RLE  DVT (off AC at this time) who was sent to [facility] ER by her cardiologist Dr. Delfina Jones for bradycardia.  She reports having episodes of shortness of breath and some occasional dizziness during exertion for the last month. She reports taking her pulse manually and noting her HR was in the 40's earlier in the month. She had followed up with her cardiologist and wore Holter monitor which revealed 28 episodes of AV Block (2nd degree Mobitz II and 3rd) lasting 30 minutes and 45 seconds and brief episodes of SVT. Patient reports she was called by Dr. Jones today and instructed to come to ER. EKG In ER reveal Sinus Bradycardia 2:1 AVB HR 40's. Patient is hemodynamically stable at present. Patient pending labs and TTE. EP consulted. She denies chest pain, chest pressure,  jaw pain, arm pain, orthopnea; PND; leg swelling; abdominal pain; nausea/ vomiting; recent syncopal episodes; or recent fevers/chills/ infections.

## 2024-05-31 NOTE — PHYSICAL EXAM
[No Acute Distress] : no acute distress [Normal Sclera/Conjunctiva] : normal sclera/conjunctiva [EOMI] : extraocular movements intact [Normal Outer Ear/Nose] : the outer ears and nose were normal in appearance [Normal Oropharynx] : the oropharynx was normal [No Lymphadenopathy] : no lymphadenopathy [No Respiratory Distress] : no respiratory distress  [Clear to Auscultation] : lungs were clear to auscultation bilaterally [Normal Rate] : normal rate  [Regular Rhythm] : with a regular rhythm [Normal S1, S2] : normal S1 and S2 [Soft] : abdomen soft [Non Tender] : non-tender [Non-distended] : non-distended [Grossly Normal Strength/Tone] : grossly normal strength/tone [No Rash] : no rash [Coordination Grossly Intact] : coordination grossly intact [No Focal Deficits] : no focal deficits [Normal Gait] : normal gait [Normal Affect] : the affect was normal [Normal Insight/Judgement] : insight and judgment were intact

## 2024-05-31 NOTE — H&P ADULT - HISTORY OF PRESENT ILLNESS
84 yr old F with medical history of HLD, hypothyroidism sec to Hashimoto's thyroiditis and was on synthroid as late as 2020 and then was stopped, RBBB, mild-mod AI, anxiety, R hip surgery 12/31/23 (fractured hip) as a result of fall complicated by RLE right soleal vein DVT (currently being monitored off AC) who presents to [facility] after being sent in by Dr Jones (cardiologist) for concern of heart block on Holter monitor.   Pt state that she has been experiencing SOB and dizziness for the past month.  Pt saw Dr Jones who ordered a Holter monitor which revealed 28 episodes of CHB with HR in the 30's during sleeping hours that lasted total 30 mins and 45 secs and brief episodes of SVTs.  Pt was contacted by Dr Jones and was instructed to come to ED.  Pt arrives and found to have 2:1 AV block on Telemetry. Denies CP/ palpitations/ LOC/ fevers/ cough    FH- F- CVA; Sister- Liver Ca  SH- uses caffiene; denies other habits

## 2024-05-31 NOTE — ED ADULT NURSE NOTE - CAS TRG GENERAL NORM CIRC DET
Personalized Preventive Plan for Ceferino Rosa - 4/13/2022  Medicare offers a range of preventive health benefits. Some of the tests and screenings are paid in full while other may be subject to a deductible, co-insurance, and/or copay. Some of these benefits include a comprehensive review of your medical history including lifestyle, illnesses that may run in your family, and various assessments and screenings as appropriate. After reviewing your medical record and screening and assessments performed today your provider may have ordered immunizations, labs, imaging, and/or referrals for you. A list of these orders (if applicable) as well as your Preventive Care list are included within your After Visit Summary for your review. Other Preventive Recommendations:    · A preventive eye exam performed by an eye specialist is recommended every 1-2 years to screen for glaucoma; cataracts, macular degeneration, and other eye disorders. · A preventive dental visit is recommended every 6 months. · Try to get at least 150 minutes of exercise per week or 10,000 steps per day on a pedometer . · Order or download the FREE \"Exercise & Physical Activity: Your Everyday Guide\" from [employer]. Call 4-505.978.9057 or search [employer] online. · You need 7662-1411 mg of calcium and 5584-7381 IU of vitamin D per day. It is possible to meet your calcium requirement with diet alone, but a vitamin D supplement is usually necessary to meet this goal.  · When exposed to the sun, use a sunscreen that protects against both UVA and UVB radiation with an SPF of 30 or greater. Reapply every 2 to 3 hours or after sweating, drying off with a towel, or swimming. · Always wear a seat belt when traveling in a car. Always wear a helmet when riding a bicycle or motorcycle.
Strong peripheral pulses

## 2024-05-31 NOTE — CONSULT NOTE ADULT - SUBJECTIVE AND OBJECTIVE BOX
CARDIAC ELECTROPHYSIOLOGY  [facility]   Office: [address] [place] [address]  Telephone: [phone] Fax:[phone]      HPI:  Pt is an 83 y/o F HLD, hypothyroidism, RBBB, mild-mod AI, anxiety, R hip surgery 23 (fractured hip) complicated by RLE right soleal vein DVT (currently being monitored off AC) who presents to [facility] after being sent in by Dr Jones (cardiologist) for concern of heart block on Holter monitor.   Pt state that she has been experiencing SOB and dizziness for the past month.  Pt saw Dr Jones who ordered a Holter monitor which revealed episodes of CHB with HR in the 30's during sleeping hours and brief episodes of SVTs.  Pt was contacted by Dr Jones and was instructed to come to ED.  Pt arrives and found to have 2:1 AV block on Telemetry.  Pt does report that 5 years ago she had a syncopal episode for which she was w/u (urgent care or [facility], pt unsure where she went) and claims the work-up was "ok". Pt states she continues to have SOB and dizziness but denies any CP, palp, fevers, chills, near-syncope or syncope.  Pt also states she does not take any medications  ?    Cardiology Summary  ?  TTE 2021 EF 60-65%, mild MR, mild-mod AI  TTE 2017 EF 70%, mild DD, mild-mod MR, mild TR  Stress echo 2017 borderline study, possible small region of apical anteroseptal insufficiency  Carter 2021 NSR SVT episodes - longest 12min      PAST MEDICAL & SURGICAL HISTORY:    Vertigo  Phlebitis right leg  s/p surgery  History of  section  Status post cataract extraction right eye          REVIEW OF SYSTEMS:    CONSTITUTIONAL: No fever, weight loss, or fatigue  EYES: No visual disturbances  NECK: No pain or stiffness  RESPIRATORY: No cough, wheezing, chills or hemoptysis; No shortness of breath  CARDIOVASCULAR: see HPI  GASTROINTESTINAL: No abdominal or epigastric pain. No nausea, vomiting, or hematemesis; No diarrhea or constipation. No melena or hematochezia.  NEUROLOGICAL: No headaches, memory loss, loss of strength, numbness, or tremors  SKIN: No itching, burning, rashes, or lesions   LYMPH NODES: No enlarged glands  ENDOCRINE: No heat or cold intolerance; No hair loss  PSYCHIATRIC: No depression, anxiety, mood swings, or difficulty sleeping  HEME/LYMPH: No easy bruising, or bleeding gums          Allergies  Advil (Swelling)  codeine (Swelling)  mycins (Swelling)  Excedrin (Swelling)  Cipro (Swelling)            SOCIAL HISTORY:  Tobacco use denies  Alcohol use denies  Illicit drug use denies  Caffine use denies    FAMILY HISTORY:  Denies any family history of heart disease    Vital Signs Last 24 Hrs  T(C): 36.7 (31 May 2024 14:09), Max: 36.7 (31 May 2024 14:09)  T(F): 98 (31 May 2024 14:09), Max: 98 (31 May 2024 14:09)  HR: 46 (31 May 2024 14:20) (46 - 48)  BP: 191/50 (31 May 2024 14:20) (191/50 - 204/74)  RR: 18 (31 May 2024 14:20) (18 - 18)  SpO2: 98% (31 May 2024 14:20) (96% - 98%)    Parameters below as of 31 May 2024 14:20  Patient On (Oxygen Delivery Method): room air        Physical Exam:  Constitutional: AAOx3, NAD  Cardiovascular: +S1S2 RRR, no murmurs, rubs, gallops   Pulmonary: CTA b/l, unlabored, no wheezes, rales. rhonci  Abdomen: soft NTND  Extremities: no edema b/l, +distal pulses b/l  Neuro: non focal, speech clear, URRUTIA x 4    LABS:                        13.4   8.06  )-----------( 212      ( 31 May 2024 14:25 )             40.7         PT/INR - ( 31 May 2024 14:25 )   PT: 10.9 sec;   INR: 0.98 ratio         PTT - ( 31 May 2024 14:25 )  PTT:30.7 sec      A/P  Pt is an 83 y/o F HLD, hypothyroidism, RBBB, mild-mod AI, anxiety, R hip surgery 23 (fractured hip) complicated by RLE right soleal vein DVT (currently being monitored off AC) who presents to [facility] after being sent in by Dr Jones (cardiologist) for concern of heart block on Holter monitor.   Pt state that she has been experiencing SOB and dizziness for the past month.  Pt saw Dr Jones who ordered a Holter monitor which revealed episodes of CHB with HR in the 30's during sleeping hours and brief episodes of SVTs.  Pt was contacted by Dr Jones and was instructed to come to ED.  Pt arrives and found to have 2:1 AV block on Telemetry.  Pt does report that 5 years ago she had a syncopal episode for which she was w/u (urgent care or [facility], pt unsure where she went) and claims the work-up was "ok". Pt states she continues to have SOB and dizziness but denies any CP, palp, fevers, chills, near-syncope or syncope.  Pt also states she does not take any medications  ?    Plan  -stat labs, EKG, TTE  -Admit to medicine  - Keep NPO   - Will d/w Dr Green, Further recommendation to follow CARDIAC ELECTROPHYSIOLOGY  [facility]   Office: [address] [place] [address]  Telephone: [phone] Fax:[phone]      HPI:  Pt is an 85 y/o F HLD, hypothyroidism, RBBB, mild-mod AI, anxiety, R hip surgery 23 (fractured hip) complicated by RLE right soleal vein DVT (currently being monitored off AC) who presents to [facility] after being sent in by Dr Jones (cardiologist) for concern of heart block on Holter monitor.   Pt state that she has been experiencing SOB and dizziness for the past month.  Pt saw Dr Jones who ordered a Holter monitor which revealed episodes of CHB with HR in the 30's during sleeping hours and brief episodes of SVTs.  Pt was contacted by Dr Jones and was instructed to come to ED.  Pt arrives and found to have 2:1 AV block on Telemetry.  Pt does report that 5 years ago she had a syncopal episode for which she was w/u (urgent care or [facility], pt unsure where she went) and claims the work-up was "ok". Pt states she continues to have SOB and dizziness but denies any CP, palp, fevers, chills, near-syncope or syncope.  Pt also states she does not take any medications  ?    Cardiology Summary  ?  TTE 2021 EF 60-65%, mild MR, mild-mod AI    TTE 2017 EF 70%, mild DD, mild-mod MR, mild TR    ECG 23 SR 78bpm, with RBBB, TADEO 136ms, QRSD 120ms    Stress echo 2017 borderline study, possible small region of apical anteroseptal insufficiency    Carter 2021 NSR SVT episodes - longest 12min      PAST MEDICAL & SURGICAL HISTORY:    Vertigo  Phlebitis right leg  s/p surgery  History of  section  Status post cataract extraction right eye          REVIEW OF SYSTEMS:    CONSTITUTIONAL: No fever, weight loss, or fatigue  EYES: No visual disturbances  NECK: No pain or stiffness  RESPIRATORY: No cough, wheezing, chills or hemoptysis; No shortness of breath  CARDIOVASCULAR: see HPI  GASTROINTESTINAL: No abdominal or epigastric pain. No nausea, vomiting, or hematemesis; No diarrhea or constipation. No melena or hematochezia.  NEUROLOGICAL: No headaches, memory loss, loss of strength, numbness, or tremors  SKIN: No itching, burning, rashes, or lesions   LYMPH NODES: No enlarged glands  ENDOCRINE: No heat or cold intolerance; No hair loss  PSYCHIATRIC: No depression, anxiety, mood swings, or difficulty sleeping  HEME/LYMPH: No easy bruising, or bleeding gums          Allergies  Advil (Swelling)  codeine (Swelling)  mycins (Swelling)  Excedrin (Swelling)  Cipro (Swelling)            SOCIAL HISTORY:  Tobacco use denies  Alcohol use denies  Illicit drug use denies  Caffine use denies    FAMILY HISTORY:  Denies any family history of heart disease    Vital Signs Last 24 Hrs  T(C): 36.7 (31 May 2024 14:09), Max: 36.7 (31 May 2024 14:09)  T(F): 98 (31 May 2024 14:09), Max: 98 (31 May 2024 14:09)  HR: 46 (31 May 2024 14:20) (46 - 48)  BP: 191/50 (31 May 2024 14:20) (191/50 - 204/74)  RR: 18 (31 May 2024 14:20) (18 - 18)  SpO2: 98% (31 May 2024 14:20) (96% - 98%)    Parameters below as of 31 May 2024 14:20  Patient On (Oxygen Delivery Method): room air        Physical Exam:  Constitutional: AAOx3, NAD  Cardiovascular: +S1S2 RRR, no murmurs, rubs, gallops   Pulmonary: CTA b/l, unlabored, no wheezes, rales. rhonci  Abdomen: soft NTND  Extremities: no edema b/l, +distal pulses b/l  Neuro: non focal, speech clear, URRUTIA x 4    LABS:                        13.4   8.06  )-----------( 212      ( 31 May 2024 14:25 )             40.7         PT/INR - ( 31 May 2024 14:25 )   PT: 10.9 sec;   INR: 0.98 ratio         PTT - ( 31 May 2024 14:25 )  PTT:30.7 sec      A/P  Pt is an 85 y/o F HLD, hypothyroidism, RBBB, mild-mod AI, anxiety, R hip surgery 23 (fractured hip) complicated by RLE right soleal vein DVT (currently being monitored off AC) who presents to [facility] after being sent in by Dr Jones (cardiologist) for concern of heart block on Holter monitor.   Pt state that she has been experiencing SOB and dizziness for the past month.  Pt saw Dr Jones who ordered a Holter monitor which revealed episodes of CHB with HR in the 30's during sleeping hours and brief episodes of SVTs.  Pt was contacted by Dr Jones and was instructed to come to ED.  Pt arrives and found to have 2:1 AV block on Telemetry.  Pt does report that 5 years ago she had a syncopal episode for which she was w/u (urgent care or [facility], pt unsure where she went) and claims the work-up was "ok". Pt states she continues to have SOB and dizziness but denies any CP, palp, fevers, chills, near-syncope or syncope.  Pt also states she does not take any medications  ?  Telemetry- 2:1 AV block  ECG 24- SR 49bpm with 2:1 AV block and iRBBB, TADEO 152, QRSD 114ms     Plan  -stat labs, EKG, TTE  -Admit to medicine  -Keep NPO   -Will d/w Dr Green, Further recommendation to follow

## 2024-05-31 NOTE — HEALTH RISK ASSESSMENT
[Good] : ~his/her~  mood as  good [Yes] : Yes [Monthly or less (1 pt)] : Monthly or less (1 point) [1 or 2 (0 pts)] : 1 or 2 (0 points) [Never (0 pts)] : Never (0 points) [No] : In the past 12 months have you used drugs other than those required for medical reasons? No [0] : 2) Feeling down, depressed, or hopeless: Not at all (0) [PHQ-2 Negative - No further assessment needed] : PHQ-2 Negative - No further assessment needed [Never] : Never [Patient declined mammogram] : Patient declined mammogram [Patient declined PAP Smear] : Patient declined PAP Smear [Patient declined colonoscopy] : Patient declined colonoscopy [With Family] : lives with family [] :  [# Of Children ___] : has [unfilled] children [Fully functional (bathing, dressing, toileting, transferring, walking, feeding)] : Fully functional (bathing, dressing, toileting, transferring, walking, feeding) [Fully functional (using the telephone, shopping, preparing meals, housekeeping, doing laundry, using] : Fully functional and needs no help or supervision to perform IADLs (using the telephone, shopping, preparing meals, housekeeping, doing laundry, using transportation, managing medications and managing finances) [Smoke Detector] : smoke detector [Carbon Monoxide Detector] : carbon monoxide detector [Seat Belt] :  uses seat belt [Sunscreen] : uses sunscreen [Designated Healthcare Proxy] : Designated healthcare proxy [Name: ___] : Health Care Proxy's Name: [unfilled]  [Relationship: ___] : Relationship: [unfilled] [de-identified] : one glass of white wine only on special occasions [Audit-CScore] : 1 [de-identified] : limited from hip surgery  [de-identified] : mediterranean  [[id number]] : 0 [Reports changes in hearing] : Reports no changes in hearing [Reports changes in vision] : Reports no changes in vision [Reports changes in dental health] : Reports no changes in dental health [de-identified] : s/p cataract surgery 4 years ago with improved vision  [de-identified] : will make dental follow up  [FreeTextEntry4] : Amy Arenas and Korin Mendoza

## 2024-05-31 NOTE — ED PROVIDER NOTE - PHYSICAL EXAMINATION
Gen: no acute distress  Head: normocephalic, atraumatic  Lung: CTAB, no respiratory distress, no wheezing, rales, rhonchi  CV: bradycardic, irregular rhythm, no peripheral edema   Abd: soft, non-tender, non-distended  MSK: No edema, no visible deformities, full range of motion in all 4 extremities  Neuro: No focal neurologic deficits  Skin: No rash

## 2024-05-31 NOTE — CONSULT NOTE ADULT - PROBLEM SELECTOR RECOMMENDATION 9
-Outpatient HOLTER MONITOR: 5/20/24-5/22/24 11 SVT runs occurred; 28 episodes of AV Block (2nd degree Mobitz II and Complete Heart Block) occurred lasting a total of 30 minutes and 45 seconds.   -TELE with 2:1 AVB; EP consulted and pending recommendations  -Please avoid AV mary alice blocking agents  -Please having pacing pads at bedside  -Check TSH; free T4 and T3  -Obtain TTE -Outpatient HOLTER MONITOR: 5/20/24-5/22/24 11 SVT runs occurred; 28 episodes of AV Block (2nd degree Mobitz II and Complete Heart Block) occurred lasting a total of 30 minutes and 45 seconds.   -TELE with 2:1 AVB; EP consulted and pending recommendations  -Please avoid AV mary alice blocking agents  -Please having pacing pads at bedside  -Check TSH; free T4 and T3  -trend trops, cardiac enzymes r/o ischemia  -Obtain TTE

## 2024-05-31 NOTE — PATIENT PROFILE ADULT - FALL HARM RISK - HARM RISK INTERVENTIONS

## 2024-05-31 NOTE — CONSULT NOTE ADULT - SUBJECTIVE AND OBJECTIVE BOX
[facility]                                              CARDIOLOGY AT [place] [address]                                             Telephone: [phone]. Fax:[phone]                                                       CARDIOLOGY CONSULTATION NOTE                                                                                             History obtained by: Patient and medical record  Community Cardiologist: Dr. Delfina Jones   obtained: Yes [  ] No [  X]  Reason for Consultation: bradycardia  Available out pt records reviewed: Yes [ X ] No [  ]    Chief complaint:    Patient is a 84y old  Female who presents with a chief complaint of bradycardia     HPI: 84F PMH hypothyroidism (on no medications), RBBB, anxiety, R hip fracture s/p right hemiarthroplasty 23 complicated by RLE  DVT (off AC at this time) who was sent to [facility] ER by her cardiologist Dr. Delfina Jones for bradycardia.  She reports having episodes of shortness of breath and some occasional dizziness during exertion for the last month. She reports taking her pulse manually and noting her HR was in the 40's earlier in the month. She had followed up with her cardiologist and wore Holter monitor which revealed 28 episodes of AV Block (2nd degree Mobitz II and 3rd) lasting 30 minutes and 45 seconds and brief episodes of SVT. Patient reports she was called by Dr. Jones today and instructed to come to ER. EKG In ER reveal Sinus Bradycardia 2:1 AVB HR 40's. Patient pending labs and TTE. EP consulted. She denies chest pain, chest pressure,  jaw pain, arm pain, orthopnea; PND; leg swelling; abdominal pain; nausea/ vomiting; recent syncopal episodes; or recent fevers/chills/ infections.    CARDIAC TESTING   HOLTER MONITOR: 24-24 11 SVT runs occurred; 28 episodes of AV Block (2nd degree Mobitz II and 3rd) occurred lasting a total of 30 minutes and 45 seconds.   ECHO: TTE OUTPATIENT 1/15/21 LVEF 60-65%; no regional wall motions present; mild-mod (1-2+) AVR; mild (1+) MVR; trace TVR; trace pulmonic regurgitation; no pericardial effusion  ECHO: TTE OUTPATIENT 17 LVEF 70%; Normal LV wall thickness; mild left ventricular diastolic dysfunction    PAST MEDICAL HISTORY  Vertigo    Phlebitis    Hypothyroidism        PAST SURGICAL HISTORY  History of  section    Status post cataract extraction    Right hip hemiarthroplasty        SOCIAL HISTORY:  Denies smoking/alcohol/drugs  CIGARETTES:   denies  ALCOHOL: denies   DRUGS: denies    FAMILY HISTORY:    Family History of Cardiovascular Disease:  Yes [  ] No [ X ]  Coronary Artery Disease in first degree relative: Yes [  ] No [X  ]  Sudden Cardiac Death in First degree relative: Yes [  ] No [ X ]    HOME MEDICATIONS:  acetaminophen 325 mg oral tablet: 3 tab(s) orally every 8 hours (:24)  Antivert: 6 milligram(s) orally once a day (2017 04:50)  enoxaparin: 40 milligram(s) subcutaneous once a day 40 mg subcutaneous once a day. (:24)  magnesium hydroxide 8% oral suspension: 30 milliliter(s) orally once a day As needed Constipation (:24)  Multiple Vitamins oral tablet: 1 tab(s) orally once a day (:24)  ondansetron 2 mg/mL injectable solution: 4 milligram(s) injectable every 8 hours as needed for  nausea (:24)  polyethylene glycol 3350 oral powder for reconstitution: 17 gram(s) orally once a day (at bedtime) (:24)  senna leaf extract oral tablet: 2 tab(s) orally once a day (at bedtime) (:24)  traMADol 50 mg oral tablet: 1 tab(s) orally every 4 hours As needed Moderate Pain (4 - 6) (:24)  traMADol 50 mg oral tablet: 2 tab(s) orally every 6 hours As needed Severe Pain (7 - 10) (:24)  traMADol 50 mg oral tablet: 1 tab(s) orally every 6 hours As needed Mild Pain (1 - 3) (:24)      CURRENT CARDIAC MEDICATIONS:      CURRENT OTHER MEDICATIONS:      ALLERGIES:   Advil (Swelling)  codeine (Swelling)  mycins (Swelling)  Excedrin (Swelling)  Cipro (Swelling)      REVIEW OF SYMPTOMS:   CONSTITUTIONAL: No fever, no chills, no weight loss, no weight gain, no fatigue   ENMT:  No vertigo; No sinus or throat pain  NECK: No pain or stiffness  CARDIOVASCULAR: +SIMPSON; +dizziness; No chest pain, no dyspnea at rest, no syncope/presyncope, no palpitations, no Orthopnea, no Paroxsymal nocturnal dyspnea; no leg swelling  RESPIRATORY: +SIMPSON; no cough, no wheezing  : No dysuria, no hematuria   GI: No dark color stool, no nausea, no diarrhea, no constipation, no abdominal pain   NEURO: No headache, no slurred speech   MUSCULOSKELETAL: No joint pain or swelling; No muscle, back, or extremity pain  PSYCH: No agitation, no anxiety.    ALL OTHER REVIEW OF SYSTEMS ARE NEGATIVE.    VITAL SIGNS:  T(C): 36.7 (24 @ 14:09), Max: 36.7 (24 @ 14:09)  T(F): 98 (24 @ 14:09), Max: 98 (24 @ 14:09)  HR: 46 (24 @ 14:20) (46 - 48)  BP: 191/50 (24 @ 14:20) (191/50 - 204/74)  RR: 18 (24 @ 14:20) (18 - 18)  SpO2: 98% (24 @ 14:20) (96% - 98%)    INTAKE AND OUTPUT:       PHYSICAL EXAM:  Constitutional: Comfortable . No acute distress.   HEENT: Atraumatic and normocephalic , neck is supple . no JVD. No carotid bruit.  CNS: A&Ox3. No focal deficits.   Respiratory: CTAB, unlabored   Cardiovascular: RRR normal s1 s2. +systolic murmur  Gastrointestinal: Soft, non-tender. +Bowel sounds.   Extremities: 2+ Peripheral Pulses, No clubbing, cyanosis, or edema  Psychiatric: Calm . no agitation.   Skin: Warm and dry, no ulcers on extremities     LABS:                            13.4   8.06  )-----------( 212      ( 31 May 2024 14:25 )             40.7         139  |  101  |  15.7  ----------------------------<  99  4.4   |  23.0  |  0.65    Ca    9.2      31 May 2024 14:25  Mg     2.0         TPro  7.0  /  Alb  3.9  /  TBili  0.5  /  DBili  x   /  AST  21  /  ALT  13  /  AlkPhos  61      PT/INR - ( 31 May 2024 14:25 )   PT: 10.9 sec;   INR: 0.98 ratio         PTT - ( 31 May 2024 14:25 )  PTT:30.7 sec  Urinalysis Basic - ( 31 May 2024 14:25 )    Color: x / Appearance: x / SG: x / pH: x  Gluc: 99 mg/dL / Ketone: x  / Bili: x / Urobili: x   Blood: x / Protein: x / Nitrite: x   Leuk Esterase: x / RBC: x / WBC x   Sq Epi: x / Non Sq Epi: x / Bacteria: x          Thyroid Stimulating Hormone, Serum: 7.30 uIU/mL (24 @ 14:25)      INTERPRETATION OF TELEMETRY: Sinus Bradycardia rate 40's; 2:1 AVB; RBBB    ECG:  Sinus Bradycardia rate 49; 2:1 AVB; RBBB  Prior ECG: Yes [X  ] No [  ]

## 2024-05-31 NOTE — ED PROVIDER NOTE - ATTENDING CONTRIBUTION TO CARE
Pt has been having sob and brink for the past week. Pt saw her cardiologist and had monitor placed for bradycardia. called today and told that she had an episode of complete heart block.  Pt currently asymptomatic.    physical - ravi regular. ctab. abd - soft, nt. no edema. no rash.    plan - labs and xr reviewed.  ep aware of patient and evaluate din ED.  will admit to medicine with plan for PPM implantation.

## 2024-05-31 NOTE — ED PROVIDER NOTE - CLINICAL SUMMARY MEDICAL DECISION MAKING FREE TEXT BOX
Patient is a 83 yo female with PMHx hashimoto's thyroiditis, vertigo, PVCs and RBBB sent in by her cardiologist for bradycardia. Bradycardic, hypertensive, vss otherwise, well appearing, lungs ctab belly soft nontender no FND.    Will consult cardiology, check labs r.o electrolyte and thyroid abnormalities, cxr viral swab to r.o URI vs. PNA, troponin ekg to assess for acs, bnp to evaluate for HF, reassess.

## 2024-05-31 NOTE — ED PROVIDER NOTE - BIRTH SEX
I will call in a steroid pack for this.  No immediate sign of infection.  However, for some reason the crystal analysis was not done.  Will you please call [facility] and see if they can add it? Unknown

## 2024-05-31 NOTE — H&P ADULT - ASSESSMENT
84 yr old F with medical history of HLD, hypothyroidism sec to Hashimoto's thyroiditis and was on synthroid as late as 2020 and then was stopped, RBBB, mild-mod AI, anxiety, R hip surgery 12/31/23 (fractured hip) as a result of fall complicated by RLE right soleal vein DVT (currently being monitored off AC) who presents to [facility] after being sent in by Dr Jones (cardiologist) for concern of heart block on Holter monitor.   Pt state that she has been experiencing SOB and dizziness for the past month.  Pt saw Dr Jones who ordered a Holter monitor which revealed 28 episodes of CHB with HR in the 30's during sleeping hours that lasted total 30 mins and 45 secs and brief episodes of SVTs.  Pt was contacted by Dr Jones and was instructed to come to ED.  Pt arrives and found to have 2:1 AV block on Telemetry. Denies CP/ palpitations/ LOC/ fevers/ cough      # CHB on Holter  Telemetry- 2:1 AV block  ECG 5/31/24- SR 49bpm with 2:1 AV block and iRBBB, TADEO 152, QRSD 114ms   for PPM today  seen by EPS  Pacer pads on  Maintain NPO    # Known Hashimoto   was on replacement in the past  TSH in 7 range----  can be reactive   no recent TSH in o/p records  T4, T3 WNL    ICDs    High Acquity- Spent at least 75 mins in evaluating, assessing, medical decision making in providing patient care separate from teaching.   84 yr old F with medical history of HLD, hypothyroidism sec to Hashimoto's thyroiditis and was on synthroid as late as 2020 and then was stopped, RBBB, mild-mod AI, anxiety, R hip surgery 12/31/23 (fractured hip) as a result of fall complicated by RLE right soleal vein DVT (currently being monitored off AC) who presents to [facility] after being sent in by Dr Jones (cardiologist) for concern of heart block on Holter monitor.   Pt state that she has been experiencing SOB and dizziness for the past month.  Pt saw Dr Jones who ordered a Holter monitor which revealed 28 episodes of CHB with HR in the 30's during sleeping hours that lasted total 30 mins and 45 secs and brief episodes of SVTs.  Pt was contacted by Dr Jones and was instructed to come to ED.  Pt arrives and found to have 2:1 AV block on Telemetry. Denies CP/ palpitations/ LOC/ fevers/ cough      # CHB on Holter  Telemetry- 2:1 AV block  ECG 5/31/24- SR 49bpm with 2:1 AV block and iRBBB, TADEO 152, QRSD 114ms   for PPM today  seen by EPS  Pacer pads on  Maintain NPO    # HTN urgency  in the setting of bradycardia  CTH ordered to rule out cushings reflex though clinically she does not demonstrate any signs or symptoms for the same  Hydralazine 50 tid to start now a  Hydralazine IVP PRN      # Known Hashimoto   was on replacement in the past  TSH in 7 range----  can be reactive   no recent TSH in o/p records  T4, T3 WNL    ICDs    High Acquity- Spent at least 75 mins in evaluating, assessing, medical decision making in providing patient care separate from teaching.

## 2024-05-31 NOTE — ED PROVIDER NOTE - OBJECTIVE STATEMENT
Patient is a 85 yo female with PMHx hashimoto's thyroiditis, vertigo, PVCs and RBBB sent in by her cardiologist for bradycardia. Patient has had SOB at rest and with exertion for 1 week; patient saw her cardiologist at [place] Dr. Jones and was told to come in for a PPM due to heart block. Denies any other PMHx, smoking, alcohol use. Denies fevers, chills, dizziness, lightheadedness, dysphagia, dysarthria, diplopia, photophobia, syncope, cough, congestion, CP, abdominal pain, neck pain, back pain, diarrhea, dysuria, hematuria, hematochezia, hematemesis, n/v, recent travel, sick contacts, leg swelling.

## 2024-05-31 NOTE — REVIEW OF SYSTEMS
[Shortness Of Breath] : shortness of breath [Dysuria] : dysuria [Frequency] : frequency [Joint Pain] : joint pain [Muscle Pain] : muscle pain [Anxiety] : anxiety [Fever] : no fever [Chills] : no chills [Earache] : no earache [Nasal Discharge] : no nasal discharge [Sore Throat] : no sore throat [Chest Pain] : no chest pain [Palpitations] : no palpitations [Cough] : no cough [Abdominal Pain] : no abdominal pain [Nausea] : no nausea [Constipation] : no constipation [Diarrhea] : diarrhea [Vomiting] : no vomiting [Heartburn] : no heartburn [Itching] : no itching [Mole Changes] : no mole changes [Skin Rash] : no skin rash [Headache] : no headache [Dizziness] : no dizziness [de-identified] : occasional vertigo

## 2024-05-31 NOTE — ED ADULT NURSE NOTE - OBJECTIVE STATEMENT
pt with reports of dizziness, weakness and SOB. states went to her doctor recently and was given a halter. was then told to come to ED because she had episodes of heart block. pt in no apparent distress, even and unlabored resps present. no extremity edema present on exam, no fevers, pt bradycardic. sent by cards. mentating appropriately, AOX4 with family @ bedside. Yes

## 2024-05-31 NOTE — ED ADULT NURSE REASSESSMENT NOTE - NS ED NURSE REASSESS COMMENT FT1
pt with no change in mental status, EP @ bedside for evaluation. pt aware of plan of care. even and unlabored resps present.

## 2024-06-01 LAB
ANION GAP SERPL CALC-SCNC: 14 MMOL/L — SIGNIFICANT CHANGE UP (ref 5–17)
BUN SERPL-MCNC: 17.8 MG/DL — SIGNIFICANT CHANGE UP (ref 8–20)
CALCIUM SERPL-MCNC: 8.9 MG/DL — SIGNIFICANT CHANGE UP (ref 8.4–10.5)
CHLORIDE SERPL-SCNC: 104 MMOL/L — SIGNIFICANT CHANGE UP (ref 96–108)
CO2 SERPL-SCNC: 23 MMOL/L — SIGNIFICANT CHANGE UP (ref 22–29)
CREAT SERPL-MCNC: 0.6 MG/DL — SIGNIFICANT CHANGE UP (ref 0.5–1.3)
EGFR: 88 ML/MIN/1.73M2 — SIGNIFICANT CHANGE UP
GLUCOSE SERPL-MCNC: 97 MG/DL — SIGNIFICANT CHANGE UP (ref 70–99)
HCT VFR BLD CALC: 37.8 % — SIGNIFICANT CHANGE UP (ref 34.5–45)
HGB BLD-MCNC: 12.4 G/DL — SIGNIFICANT CHANGE UP (ref 11.5–15.5)
MAGNESIUM SERPL-MCNC: 2 MG/DL — SIGNIFICANT CHANGE UP (ref 1.6–2.6)
MCHC RBC-ENTMCNC: 27.1 PG — SIGNIFICANT CHANGE UP (ref 27–34)
MCHC RBC-ENTMCNC: 32.8 GM/DL — SIGNIFICANT CHANGE UP (ref 32–36)
MCV RBC AUTO: 82.7 FL — SIGNIFICANT CHANGE UP (ref 80–100)
PHOSPHATE SERPL-MCNC: 3.4 MG/DL — SIGNIFICANT CHANGE UP (ref 2.4–4.7)
PLATELET # BLD AUTO: 185 K/UL — SIGNIFICANT CHANGE UP (ref 150–400)
POTASSIUM SERPL-MCNC: 4 MMOL/L — SIGNIFICANT CHANGE UP (ref 3.5–5.3)
POTASSIUM SERPL-SCNC: 4 MMOL/L — SIGNIFICANT CHANGE UP (ref 3.5–5.3)
RBC # BLD: 4.57 M/UL — SIGNIFICANT CHANGE UP (ref 3.8–5.2)
RBC # FLD: 14.5 % — SIGNIFICANT CHANGE UP (ref 10.3–14.5)
SODIUM SERPL-SCNC: 140 MMOL/L — SIGNIFICANT CHANGE UP (ref 135–145)
WBC # BLD: 6.72 K/UL — SIGNIFICANT CHANGE UP (ref 3.8–10.5)
WBC # FLD AUTO: 6.72 K/UL — SIGNIFICANT CHANGE UP (ref 3.8–10.5)

## 2024-06-01 PROCEDURE — 99232 SBSQ HOSP IP/OBS MODERATE 35: CPT

## 2024-06-01 PROCEDURE — 99233 SBSQ HOSP IP/OBS HIGH 50: CPT

## 2024-06-01 PROCEDURE — 93010 ELECTROCARDIOGRAM REPORT: CPT

## 2024-06-01 RX ORDER — ENOXAPARIN SODIUM 100 MG/ML
40 INJECTION SUBCUTANEOUS EVERY 24 HOURS
Refills: 0 | Status: DISCONTINUED | OUTPATIENT
Start: 2024-06-01 | End: 2024-06-02

## 2024-06-01 RX ADMIN — ENOXAPARIN SODIUM 40 MILLIGRAM(S): 100 INJECTION SUBCUTANEOUS at 21:19

## 2024-06-01 NOTE — PROGRESS NOTE ADULT - NS ATTEND AMEND GEN_ALL_CORE FT
84F PMH hypothyroidism (on no medications), RBBB, anxiety, R hip fracture s/p right hemiarthroplasty 12/31/23 complicated by RLE  DVT (off AC at this time) who was sent to [facility] ER by her cardiologist Dr. Delfina Jones for bradycardia.  She reports having episodes of shortness of breath and some occasional dizziness during exertion for the last month. She reports taking her pulse manually and noting her HR was in the 40's earlier in the month. She had followed up with her cardiologist and wore Holter monitor which revealed 28 episodes of AV Block (2nd degree Mobitz II and 3rd) lasting 30 minutes and 45 seconds and brief episodes of SVT. Patient reports she was called by Dr. Jones today and instructed to come to ER. EKG In ER reveal Sinus Bradycardia 2:1 AVB HR 40's. Patient is hemodynamically stable at present. Patient pending labs and TTE. EP consulted. She denies chest pain, chest pressure,  jaw pain, arm pain, orthopnea; PND; leg swelling; abdominal pain; nausea/ vomiting; recent syncopal episodes; or recent fevers/chills/ infections.      Mobitz II AV block  Dizziness  shortness of breath  Sinus pause 6 seconds overnight      Seen in presence of , sitting in chair  No current complaints  sinus with mobitz 2, intermittent high grade    plan for PPM  EP aware and followinig  off of AC for treated lower extremity DVT in past  check TSH    pads on patient, telemetry, cardiac telemetry    we will follow

## 2024-06-01 NOTE — PROGRESS NOTE ADULT - ASSESSMENT
Pt is an 84 year old female with a history HLD, hypothyroidism, RBBB, mild-mod AI, anxiety, R hip surgery 12/31/23 (fractured hip) complicated by RLE right soleal vein DVT (currently being monitored off AC) who presents to [facility] after being sent in by Dr Jones (cardiologist) for concern of heart block on Holter monitor. On arrival patient noted to have 2:1 AV block and then resumed normal conduction.    Recommendations:  Plan for dual chamber PPM on Monday  Please keep NPO past midnight on Sunday  Continue monitoring on tele

## 2024-06-01 NOTE — PROGRESS NOTE ADULT - SUBJECTIVE AND OBJECTIVE BOX
[facility] Division of Hospital Medicine    Chief Complaint:      SUBJECTIVE / OVERNIGHT EVENTS:    Patient c/o dizziness from being hungry    MEDICATIONS  (STANDING):  hydrALAZINE 50 milliGRAM(s) Oral three times a day    MEDICATIONS  (PRN):  atropine Injectable 0.5 milliGRAM(s) IV Push once PRN if symptomatic bradycardia noted <30 bpm  hydrALAZINE Injectable 10 milliGRAM(s) IV Push every 2 hours PRN give for SBP > 150        I&O's Summary      PHYSICAL EXAM:  Vital Signs Last 24 Hrs  T(C): 36.8 (01 Jun 2024 06:16), Max: 36.8 (01 Jun 2024 06:16)  T(F): 98.2 (01 Jun 2024 06:16), Max: 98.2 (01 Jun 2024 06:16)  HR: 76 (01 Jun 2024 10:00) (44 - 85)  BP: 148/64 (01 Jun 2024 10:00) (142/58 - 199/90)  BP(mean): --  RR: 18 (01 Jun 2024 08:16) (18 - 18)  SpO2: 97% (01 Jun 2024 08:16) (96% - 99%)    Parameters below as of 01 Jun 2024 08:16  Patient On (Oxygen Delivery Method): room air            CONSTITUTIONAL: NAD,   ENMT: normocephalic, atraumatic  RESPIRATORY: Normal respiratory effort; lungs are clear to auscultation bilaterally  CARDIOVASCULAR: Regular rate and rhythm, normal S1 and S2, no murmur/rub/gallop  MUSCLOSKELETAL:  No edema  PSYCH: A+O to person, place, and time; affect appropriate  NEUROLOGY: CN 2-12 are intact and symmetric; no gross deficits;   SKIN: No rashes; no palpable lesions    LABS:                        12.4   6.72  )-----------( 185      ( 01 Jun 2024 05:02 )             37.8     06-01    140  |  104  |  17.8  ----------------------------<  97  4.0   |  23.0  |  0.60    Ca    8.9      01 Jun 2024 05:02  Phos  3.4     06-01  Mg     2.0     06-01    TPro  7.0  /  Alb  3.9  /  TBili  0.5  /  DBili  x   /  AST  21  /  ALT  13  /  AlkPhos  61  05-31    PT/INR - ( 31 May 2024 14:25 )   PT: 10.9 sec;   INR: 0.98 ratio         PTT - ( 31 May 2024 14:25 )  PTT:30.7 sec      Urinalysis Basic - ( 01 Jun 2024 05:02 )    Color: x / Appearance: x / SG: x / pH: x  Gluc: 97 mg/dL / Ketone: x  / Bili: x / Urobili: x   Blood: x / Protein: x / Nitrite: x   Leuk Esterase: x / RBC: x / WBC x   Sq Epi: x / Non Sq Epi: x / Bacteria: x        CAPILLARY BLOOD GLUCOSE            RADIOLOGY & ADDITIONAL TESTS:  Results Reviewed:   Imaging Personally Reviewed:  Electrocardiogram Personally Reviewed:

## 2024-06-01 NOTE — PROGRESS NOTE ADULT - SUBJECTIVE AND OBJECTIVE BOX
[facility]                                                         CARDIOLOGY AT [facility]                                                                  [address] [place] [address]                                                         Telephone: [phone]. Fax:[phone]                                                                             PROGRESS NOTE    Reason for follow up:   Nicho/CHB  Update:   6 seconds ventricular standstill - EP following    Review of symptoms:   Cardiac:  No chest pain. No dyspnea. No palpitations.  Respiratory: no cough. No dyspnea  Gastrointestinal: No diarrhea. No abdominal pain. No bleeding.   Neuro: No focal neuro complaints.    Vitals:  T(C): 36.8 (06-01-24 @ 06:16), Max: 36.8 (06-01-24 @ 06:16)  HR: 47 (06-01-24 @ 08:16) (44 - 85)  BP: 171/64 (06-01-24 @ 08:16) (142/58 - 204/74)  RR: 18 (06-01-24 @ 08:16) (18 - 18)  SpO2: 97% (06-01-24 @ 08:16) (96% - 99%)  I&O's Summary    Weight (kg): 59 (05-31 @ 17:01)    PHYSICAL EXAM:  Appearance: Comfortable. No acute distress  HEENT:  Atraumatic. Normocephalic.  Normal oral mucosa  Neurologic: A & O x 3, no gross focal deficits.  Cardiovascular: RRR S1 S2, No murmur, no rubs/gallops. No JVD  Respiratory: Lungs clear to auscultation, unlabored   Gastrointestinal:  Soft, Non-tender, + BS  Lower Extremities: + Peripheral Pulses, No clubbing, cyanosis, or edema  Psychiatry: Patient is calm. No agitation.   Skin: warm and dry.    CURRENT CARDIAC MEDICATIONS:  hydrALAZINE 50 milliGRAM(s) Oral three times a day  hydrALAZINE Injectable 10 milliGRAM(s) IV Push every 2 hours PRN    CURRENT OTHER MEDICATIONS:  atropine Injectable 0.5 milliGRAM(s) IV Push once, Stop order after: 1 Doses PRN if symptomatic bradycardia noted <30 bpm    LABS:	 	                        12.4   6.72  )-----------( 185      ( 01 Jun 2024 05:02 )             37.8     06-01    140  |  104  |  17.8  ----------------------------<  97  4.0   |  23.0  |  0.60    Ca    8.9      01 Jun 2024 05:02  Phos  3.4     06-01  Mg     2.0     06-01    TPro  7.0  /  Alb  3.9  /  TBili  0.5  /  DBili  x   /  AST  21  /  ALT  13  /  AlkPhos  61  05-31    PT/INR/PTT ( 31 May 2024 14:25 )                       :                       :      10.9         :       30.7                  .        .                   .              .           .       0.98        .                                         TSH: Thyroid Stimulating Hormone, Serum: 7.30 uIU/mL    TELEMETRY:   2-1 AV block Ventricular stand still at > 6 seconds

## 2024-06-01 NOTE — PROGRESS NOTE ADULT - ASSESSMENT
84F PMH hypothyroidism (on no medications), RBBB, anxiety, R hip fracture s/p right hemiarthroplasty 12/31/23 complicated by RLE  DVT (off AC at this time) who was sent to [facility] ER by her cardiologist Dr. Delfina Jones for bradycardia.  She reports having episodes of shortness of breath and some occasional dizziness during exertion for the last month. She reports taking her pulse manually and noting her HR was in the 40's earlier in the month. She had followed up with her cardiologist and wore Holter monitor which revealed 28 episodes of AV Block (2nd degree Mobitz II and 3rd) lasting 30 minutes and 45 seconds and brief episodes of SVT. Patient reports she was called by Dr. Jones today and instructed to come to ER. EKG In ER reveal Sinus Bradycardia 2:1 AVB HR 40's. Patient is hemodynamically stable at present. Patient pending labs and TTE. EP consulted. She denies chest pain, chest pressure,  jaw pain, arm pain, orthopnea; PND; leg swelling; abdominal pain; nausea/ vomiting; recent syncopal episodes; or recent fevers/chills/ infections.

## 2024-06-01 NOTE — PROGRESS NOTE ADULT - ASSESSMENT
84 yr old F with medical history of HLD, hypothyroidism sec to Hashimoto's thyroiditis and was on synthroid as late as 2020 and then was stopped, RBBB, mild-mod AI, anxiety, R hip surgery 12/31/23 (fractured hip) as a result of fall complicated by RLE right soleal vein DVT (currently being monitored off AC) who presents to [facility] after being sent in by Dr Jones (cardiologist) for concern of heart block on Holter monitor.   Pt state that she has been experiencing SOB and dizziness for the past month.  Pt saw Dr Jones who ordered a Holter monitor which revealed 28 episodes of CHB with HR in the 30's during sleeping hours that lasted total 30 mins and 45 secs and brief episodes of SVTs.  Pt was contacted by Dr Jones and was instructed to come to ED.  Pt arrives and found to have 2:1 AV block on Telemetry. Denies CP/ palpitations/ LOC/ fevers/ cough      # intermittent CHB and 2:1 AV block   d/w Dr Green. PPM on Monday. TTE reviewed  cardiology and EP following  Pacer pads on      # elevated BP without h/o HTN  she attributes her elevated BP to her anxiety  refusing scheduled BP meds for now.   Hydralazine IVP PRN      #  Hashimoto   was on replacement in the past  TSH in 7 range, T4, T3 WNL  can repeat in few weeks as outpatient    #. DVT prophylaxis: lovenox    #. Dispo; home after PPM

## 2024-06-01 NOTE — PROGRESS NOTE ADULT - PROBLEM SELECTOR PLAN 1
Outpatient HOLTER MONITOR: 5/20/24-5/22/24 11 SVT runs occurred; 28 episodes of AV Block (2nd degree Mobitz II and Complete Heart Block) occurred lasting a total of 30 minutes and 45 seconds.   TELE with 2:1 AVB; EP consulted and pending recommendations  Please avoid AV mary alice blocking agents  Please having pacing pads at bedside  Check TSH; free T4 and T3  trend trops, cardiac enzymes r/o ischemia  TTE - EF 55-60%, grade I, DD  EP following - plan for PPM

## 2024-06-01 NOTE — PROGRESS NOTE ADULT - SUBJECTIVE AND OBJECTIVE BOX
Subjective: No complaints    TELE: sinus rhythm with occasional 2:1 AV block    MEDICATIONS  (STANDING):  enoxaparin Injectable 40 milliGRAM(s) SubCutaneous every 24 hours  hydrALAZINE 50 milliGRAM(s) Oral three times a day    MEDICATIONS  (PRN):  atropine Injectable 0.5 milliGRAM(s) IV Push once PRN if symptomatic bradycardia noted <30 bpm  hydrALAZINE Injectable 10 milliGRAM(s) IV Push every 2 hours PRN give for SBP > 150      Allergies    Advil (Swelling)  codeine (Swelling)  mycins (Swelling)  Excedrin (Swelling)  Cipro (Swelling)    Intolerances      Vital Signs Last 24 Hrs  T(C): 36.8 (01 Jun 2024 06:16), Max: 36.8 (01 Jun 2024 06:16)  T(F): 98.2 (01 Jun 2024 06:16), Max: 98.2 (01 Jun 2024 06:16)  HR: 76 (01 Jun 2024 10:00) (44 - 85)  BP: 148/64 (01 Jun 2024 10:00) (142/58 - 199/90)  BP(mean): --  RR: 18 (01 Jun 2024 08:16) (18 - 18)  SpO2: 97% (01 Jun 2024 08:16) (96% - 99%)    Parameters below as of 01 Jun 2024 08:16  Patient On (Oxygen Delivery Method): room air        Physical Exam:  Constitutional: NAD, AAOx3  Cardiovascular: +S1S2 RRR  Pulmonary: CTA b/l, unlabored  GI: soft NTND +BS  Extremities: no pedal edema, +distal pulses b/l  Neuro: non focal, URRUTIA x4    LABS:                        12.4   6.72  )-----------( 185      ( 01 Jun 2024 05:02 )             37.8     06-01    140  |  104  |  17.8  ----------------------------<  97  4.0   |  23.0  |  0.60    Ca    8.9      01 Jun 2024 05:02  Phos  3.4     06-01  Mg     2.0     06-01    TPro  7.0  /  Alb  3.9  /  TBili  0.5  /  DBili  x   /  AST  21  /  ALT  13  /  AlkPhos  61  05-31    PT/INR - ( 31 May 2024 14:25 )   PT: 10.9 sec;   INR: 0.98 ratio         PTT - ( 31 May 2024 14:25 )  PTT:30.7 sec  Urinalysis Basic - ( 01 Jun 2024 05:02 )    Color: x / Appearance: x / SG: x / pH: x  Gluc: 97 mg/dL / Ketone: x  / Bili: x / Urobili: x   Blood: x / Protein: x / Nitrite: x   Leuk Esterase: x / RBC: x / WBC x   Sq Epi: x / Non Sq Epi: x / Bacteria: x        RADIOLOGY & ADDITIONAL TESTS:

## 2024-06-02 LAB
ALBUMIN SERPL ELPH-MCNC: 3.7 G/DL — SIGNIFICANT CHANGE UP (ref 3.3–5.2)
ALP SERPL-CCNC: 60 U/L — SIGNIFICANT CHANGE UP (ref 40–120)
ALT FLD-CCNC: 8 U/L — SIGNIFICANT CHANGE UP
ANION GAP SERPL CALC-SCNC: 13 MMOL/L — SIGNIFICANT CHANGE UP (ref 5–17)
APTT BLD: 25.2 SEC — SIGNIFICANT CHANGE UP (ref 24.5–35.6)
APTT BLD: 97.6 SEC — HIGH (ref 24.5–35.6)
AST SERPL-CCNC: 13 U/L — SIGNIFICANT CHANGE UP
BASOPHILS # BLD AUTO: 0.04 K/UL — SIGNIFICANT CHANGE UP (ref 0–0.2)
BASOPHILS NFR BLD AUTO: 0.6 % — SIGNIFICANT CHANGE UP (ref 0–2)
BILIRUB SERPL-MCNC: 0.3 MG/DL — LOW (ref 0.4–2)
BUN SERPL-MCNC: 21.6 MG/DL — HIGH (ref 8–20)
CALCIUM SERPL-MCNC: 8.7 MG/DL — SIGNIFICANT CHANGE UP (ref 8.4–10.5)
CHLORIDE SERPL-SCNC: 102 MMOL/L — SIGNIFICANT CHANGE UP (ref 96–108)
CO2 SERPL-SCNC: 22 MMOL/L — SIGNIFICANT CHANGE UP (ref 22–29)
CREAT SERPL-MCNC: 0.69 MG/DL — SIGNIFICANT CHANGE UP (ref 0.5–1.3)
EGFR: 85 ML/MIN/1.73M2 — SIGNIFICANT CHANGE UP
EOSINOPHIL # BLD AUTO: 0.17 K/UL — SIGNIFICANT CHANGE UP (ref 0–0.5)
EOSINOPHIL NFR BLD AUTO: 2.4 % — SIGNIFICANT CHANGE UP (ref 0–6)
GLUCOSE SERPL-MCNC: 105 MG/DL — HIGH (ref 70–99)
HCT VFR BLD CALC: 38.6 % — SIGNIFICANT CHANGE UP (ref 34.5–45)
HCT VFR BLD CALC: 39 % — SIGNIFICANT CHANGE UP (ref 34.5–45)
HGB BLD-MCNC: 12.7 G/DL — SIGNIFICANT CHANGE UP (ref 11.5–15.5)
HGB BLD-MCNC: 12.8 G/DL — SIGNIFICANT CHANGE UP (ref 11.5–15.5)
IMM GRANULOCYTES NFR BLD AUTO: 0.3 % — SIGNIFICANT CHANGE UP (ref 0–0.9)
INR BLD: 0.99 RATIO — SIGNIFICANT CHANGE UP (ref 0.85–1.18)
LYMPHOCYTES # BLD AUTO: 1.43 K/UL — SIGNIFICANT CHANGE UP (ref 1–3.3)
LYMPHOCYTES # BLD AUTO: 20.6 % — SIGNIFICANT CHANGE UP (ref 13–44)
MAGNESIUM SERPL-MCNC: 2 MG/DL — SIGNIFICANT CHANGE UP (ref 1.6–2.6)
MCHC RBC-ENTMCNC: 26.8 PG — LOW (ref 27–34)
MCHC RBC-ENTMCNC: 27 PG — SIGNIFICANT CHANGE UP (ref 27–34)
MCHC RBC-ENTMCNC: 32.8 GM/DL — SIGNIFICANT CHANGE UP (ref 32–36)
MCHC RBC-ENTMCNC: 32.9 GM/DL — SIGNIFICANT CHANGE UP (ref 32–36)
MCV RBC AUTO: 81.6 FL — SIGNIFICANT CHANGE UP (ref 80–100)
MCV RBC AUTO: 82 FL — SIGNIFICANT CHANGE UP (ref 80–100)
MONOCYTES # BLD AUTO: 0.59 K/UL — SIGNIFICANT CHANGE UP (ref 0–0.9)
MONOCYTES NFR BLD AUTO: 8.5 % — SIGNIFICANT CHANGE UP (ref 2–14)
MRSA PCR RESULT.: SIGNIFICANT CHANGE UP
NEUTROPHILS # BLD AUTO: 4.7 K/UL — SIGNIFICANT CHANGE UP (ref 1.8–7.4)
NEUTROPHILS NFR BLD AUTO: 67.6 % — SIGNIFICANT CHANGE UP (ref 43–77)
PHOSPHATE SERPL-MCNC: 2.8 MG/DL — SIGNIFICANT CHANGE UP (ref 2.4–4.7)
PLATELET # BLD AUTO: 188 K/UL — SIGNIFICANT CHANGE UP (ref 150–400)
PLATELET # BLD AUTO: 204 K/UL — SIGNIFICANT CHANGE UP (ref 150–400)
POTASSIUM SERPL-MCNC: 3.8 MMOL/L — SIGNIFICANT CHANGE UP (ref 3.5–5.3)
POTASSIUM SERPL-SCNC: 3.8 MMOL/L — SIGNIFICANT CHANGE UP (ref 3.5–5.3)
PROT SERPL-MCNC: 6.6 G/DL — SIGNIFICANT CHANGE UP (ref 6.6–8.7)
PROTHROM AB SERPL-ACNC: 11 SEC — SIGNIFICANT CHANGE UP (ref 9.5–13)
RBC # BLD: 4.71 M/UL — SIGNIFICANT CHANGE UP (ref 3.8–5.2)
RBC # BLD: 4.78 M/UL — SIGNIFICANT CHANGE UP (ref 3.8–5.2)
RBC # FLD: 14.3 % — SIGNIFICANT CHANGE UP (ref 10.3–14.5)
RBC # FLD: 14.6 % — HIGH (ref 10.3–14.5)
S AUREUS DNA NOSE QL NAA+PROBE: SIGNIFICANT CHANGE UP
SODIUM SERPL-SCNC: 137 MMOL/L — SIGNIFICANT CHANGE UP (ref 135–145)
WBC # BLD: 6.95 K/UL — SIGNIFICANT CHANGE UP (ref 3.8–10.5)
WBC # BLD: 8.69 K/UL — SIGNIFICANT CHANGE UP (ref 3.8–10.5)
WBC # FLD AUTO: 6.95 K/UL — SIGNIFICANT CHANGE UP (ref 3.8–10.5)
WBC # FLD AUTO: 8.69 K/UL — SIGNIFICANT CHANGE UP (ref 3.8–10.5)

## 2024-06-02 PROCEDURE — 93010 ELECTROCARDIOGRAM REPORT: CPT

## 2024-06-02 PROCEDURE — 93970 EXTREMITY STUDY: CPT | Mod: 26

## 2024-06-02 PROCEDURE — 71045 X-RAY EXAM CHEST 1 VIEW: CPT | Mod: 26

## 2024-06-02 RX ORDER — DIAZEPAM 5 MG
5 TABLET ORAL ONCE
Refills: 0 | Status: DISCONTINUED | OUTPATIENT
Start: 2024-06-02 | End: 2024-06-02

## 2024-06-02 RX ORDER — CHLORHEXIDINE GLUCONATE 213 G/1000ML
1 SOLUTION TOPICAL
Refills: 0 | Status: DISCONTINUED | OUTPATIENT
Start: 2024-06-02 | End: 2024-06-04

## 2024-06-02 RX ORDER — ACETAMINOPHEN 500 MG
650 TABLET ORAL EVERY 6 HOURS
Refills: 0 | Status: DISCONTINUED | OUTPATIENT
Start: 2024-06-02 | End: 2024-06-04

## 2024-06-02 RX ORDER — HYDRALAZINE HCL 50 MG
50 TABLET ORAL THREE TIMES A DAY
Refills: 0 | Status: DISCONTINUED | OUTPATIENT
Start: 2024-06-02 | End: 2024-06-04

## 2024-06-02 RX ORDER — HEPARIN SODIUM 5000 [USP'U]/ML
2000 INJECTION INTRAVENOUS; SUBCUTANEOUS EVERY 6 HOURS
Refills: 0 | Status: DISCONTINUED | OUTPATIENT
Start: 2024-06-02 | End: 2024-06-03

## 2024-06-02 RX ORDER — HEPARIN SODIUM 5000 [USP'U]/ML
4500 INJECTION INTRAVENOUS; SUBCUTANEOUS EVERY 6 HOURS
Refills: 0 | Status: DISCONTINUED | OUTPATIENT
Start: 2024-06-02 | End: 2024-06-03

## 2024-06-02 RX ORDER — HEPARIN SODIUM 5000 [USP'U]/ML
INJECTION INTRAVENOUS; SUBCUTANEOUS
Qty: 25000 | Refills: 0 | Status: DISCONTINUED | OUTPATIENT
Start: 2024-06-02 | End: 2024-06-03

## 2024-06-02 RX ADMIN — Medication 50 MILLIGRAM(S): at 06:34

## 2024-06-02 RX ADMIN — Medication 5 MILLIGRAM(S): at 05:49

## 2024-06-02 RX ADMIN — HEPARIN SODIUM 1100 UNIT(S)/HR: 5000 INJECTION INTRAVENOUS; SUBCUTANEOUS at 19:04

## 2024-06-02 RX ADMIN — Medication 650 MILLIGRAM(S): at 12:07

## 2024-06-02 RX ADMIN — CHLORHEXIDINE GLUCONATE 1 APPLICATION(S): 213 SOLUTION TOPICAL at 05:41

## 2024-06-02 RX ADMIN — Medication 5 MILLIGRAM(S): at 07:06

## 2024-06-02 RX ADMIN — Medication 10 MILLIGRAM(S): at 01:40

## 2024-06-02 RX ADMIN — Medication 650 MILLIGRAM(S): at 11:37

## 2024-06-02 RX ADMIN — Medication 650 MILLIGRAM(S): at 19:20

## 2024-06-02 RX ADMIN — HEPARIN SODIUM 1100 UNIT(S)/HR: 5000 INJECTION INTRAVENOUS; SUBCUTANEOUS at 16:01

## 2024-06-02 RX ADMIN — HEPARIN SODIUM 1100 UNIT(S)/HR: 5000 INJECTION INTRAVENOUS; SUBCUTANEOUS at 23:49

## 2024-06-02 NOTE — CONSULT NOTE ADULT - SUBJECTIVE AND OBJECTIVE BOX
Patient is a 84y old  Female who presents with a chief complaint of bradycardia (31 May 2024 15:41)      BRIEF HOSPITAL COURSE: 84F PMH hypothyroidism (on no medications), RBBB, anxiety, R hip fracture s/p right hemiarthroplasty 23 complicated by RLE  DVT (off AC at this time) who was sent to [facility] ER by her cardiologist Dr. Delfina Jones for bradycardia.  She reports having episodes of shortness of breath and some occasional dizziness during exertion for the last month. She reports taking her pulse manually and noting her HR was in the 40's earlier in the month. She had followed up with her cardiologist and wore Holter monitor which revealed 28 episodes of AV Block (2nd degree Mobitz II and 3rd) lasting 30 minutes and 45 seconds and brief episodes of SVT. Patient reports she was called by Dr. Jones today and instructed to come to ER. EKG In ER reveal Sinus Bradycardia 2:1 AVB HR 40's.    EP Following plan for dual chamber ppm on Monday    Events last 24 hours: Pt with multiple cardiac arrythmia' s from NSR, CHB, 2-1 AVB and ventricular stand still      PAST MEDICAL & SURGICAL HISTORY:  Vertigo      Phlebitis  right leg  s/p surgery      History of  section      Status post cataract extraction  right eye        Allergies    Advil (Swelling)  codeine (Swelling)  mycins (Swelling)  Excedrin (Swelling)  Cipro (Swelling)    Intolerances      FAMILY HISTORY:    SOCIAL HISTORY:     Review of Systems:  CONSTITUTIONAL: No fever, chills, or fatigue.  EYES: No eye pain, visual disturbances, or discharge.  ENMT:  No difficulty hearing, tinnitus, or vertigo. No sinus or throat pain.  NECK: No pain or stiffness.  RESPIRATORY: No shortness of breath, cough, or wheezing.  CARDIOVASCULAR: No chest pain, palpitations, dizziness, or leg swelling.  GASTROINTESTINAL: No abdominal or epigastric pain. No nausea, vomiting,  diarrhea, or constipation. No hematemesis, melena, or hematochezia.  GENITOURINARY: No dysuria, frequency, hematuria, or incontinence.  NEUROLOGICAL: No headaches, memory loss, loss of strength, numbness, or tremors.  SKIN: No itching, burning, rashes, or lesions.  MUSCULOSKELETAL: No joint pain or swelling; No muscle, back, or extremity pain.  PSYCHIATRIC: No depression, anxiety, mood swings, or difficulty sleeping.    Medications:    hydrALAZINE 50 milliGRAM(s) Oral three times a day  hydrALAZINE Injectable 10 milliGRAM(s) IV Push every 2 hours PRN          enoxaparin Injectable 40 milliGRAM(s) SubCutaneous every 24 hours    atropine Injectable 0.5 milliGRAM(s) IV Push once PRN                      ICU Vital Signs Last 24 Hrs  T(C): 36.6 (2024 21:17), Max: 36.8 (2024 06:16)  T(F): 97.9 (2024 21:17), Max: 98.2 (2024 06:16)  HR: 90 (2024 22:34) (44 - 90)  BP: 152/62 (2024 22:34) (132/60 - 171/64)  BP(mean): 97 (2024 17:04) (97 - 97)  ABP: --  ABP(mean): --  RR: 18 (2024 22:34) (18 - 18)  SpO2: 96% (:34) (95% - 99%)    O2 Parameters below as of 2024 22:34  Patient On (Oxygen Delivery Method): room air          Vital Signs Last 24 Hrs  T(C): 36.6 (2024 21:17), Max: 36.8 (2024 06:16)  T(F): 97.9 (2024 21:17), Max: 98.2 (2024 06:16)  HR: 90 (2024 22:34) (44 - 90)  BP: 152/62 (2024 22:34) (132/60 - 171/64)  BP(mean): 97 (2024 17:04) (97 - 97)  RR: 18 (2024 22:34) (18 - 18)  SpO2: 96% (:34) (95% - 99%)    Parameters below as of 2024 22:34  Patient On (Oxygen Delivery Method): room air            I&O's Detail    2024 07:01  -  2024 01:07  --------------------------------------------------------  IN:    Oral Fluid: 630 mL  Total IN: 630 mL    OUT:  Total OUT: 0 mL    Total NET: 630 mL          LABS:                        12.4   6.72  )-----------( 185      ( 2024 05:02 )             37.8     06-01    140  |  104  |  17.8  ----------------------------<  97  4.0   |  23.0  |  0.60    Ca    8.9      2024 05:02  Phos  3.4     06-01  Mg     2.0     06-01    TPro  7.0  /  Alb  3.9  /  TBili  0.5  /  DBili  x   /  AST  21  /  ALT  13  /  AlkPhos  61  05-31          CAPILLARY BLOOD GLUCOSE        PT/INR - ( 31 May 2024 14:25 )   PT: 10.9 sec;   INR: 0.98 ratio         PTT - ( 31 May 2024 14:25 )  PTT:30.7 sec  Urinalysis Basic - ( 2024 05:02 )    Color: x / Appearance: x / SG: x / pH: x  Gluc: 97 mg/dL / Ketone: x  / Bili: x / Urobili: x   Blood: x / Protein: x / Nitrite: x   Leuk Esterase: x / RBC: x / WBC x   Sq Epi: x / Non Sq Epi: x / Bacteria: x        CULTURES:      Physical Examination:      General: Well appearing, lying in bed in NAD.    HEENT: Pupils equal, reactive to light. Symmetric. No scleral icterus or injection.    PULM: Clear to auscultation B/L. No wheezes, rales, or rhonchi appreciated. No significant sputum production or increased respiratory effort.    NECK: Supple, no lymphadenopathy, trachea midline.    CVS: Regular rate and rhythm, no murmurs appreciated, +s1/s2.    ABD: Soft, nondistended, nontender, normoactive bowel sounds.    EXT: No edema, nontender.    SKIN: Warm and well perfused, no rashes noted.    NEURO: Alert, oriented, interactive, nonfocal.    RADIOLOGY: < from: Xray Chest 1 View- PORTABLE-Urgent (Xray Chest 1 View- PORTABLE-Urgent .) (24 @ 15:09) >    ACC: [id number] EXAM:  XR CHEST PORTABLE URGENT 1V   ORDERED BY: MAZIN FOY     PROCEDURE DATE:  2024          INTERPRETATION:  AP semierect chest on May 31, 2024 at 2:54 PM. Patient   is short of breath and bradycardia.    Heart magnified by technique.    No lung consolidation or layering effusion is evident.    Chest is similar to 2023.    IMPRESSION: No acute finding or change.    --- End of Report ---            ANNELISE CARLSON MD; Attending Radiologist  This document has been electronically signed. May 31 2024  4:50PM    < end of copied text >

## 2024-06-02 NOTE — PROGRESS NOTE ADULT - SUBJECTIVE AND OBJECTIVE BOX
Subjective: Transferred to the ICU overnight after episode of transient CHB associated with dizziness. Also NSVT noted. TVP was placed via right IJ.    TELE: Mostly 1:1 AV conduction with periods of 2:1 AV block and one episode of transient complete heart block with pauses up to 5 seconds.     MEDICATIONS  (STANDING):  chlorhexidine 2% Cloths 1 Application(s) Topical <User Schedule>  enoxaparin Injectable 40 milliGRAM(s) SubCutaneous every 24 hours  hydrALAZINE 50 milliGRAM(s) Oral three times a day    MEDICATIONS  (PRN):  acetaminophen     Tablet .. 650 milliGRAM(s) Oral every 6 hours PRN Mild Pain (1 - 3), Moderate Pain (4 - 6), Severe Pain (7 - 10)  atropine Injectable 0.5 milliGRAM(s) IV Push once PRN if symptomatic bradycardia noted <30 bpm  hydrALAZINE Injectable 10 milliGRAM(s) IV Push every 2 hours PRN give for SBP > 150      Allergies    Advil (Swelling)  codeine (Swelling)  mycins (Swelling)  Excedrin (Swelling)  Cipro (Swelling)    Intolerances        Vital Signs Last 24 Hrs  T(C): 36.6 (02 Jun 2024 12:00), Max: 36.7 (02 Jun 2024 03:05)  T(F): 97.9 (02 Jun 2024 12:00), Max: 98 (02 Jun 2024 03:05)  HR: 87 (02 Jun 2024 12:00) (70 - 93)  BP: 113/59 (02 Jun 2024 12:00) (96/85 - 179/115)  BP(mean): 75 (02 Jun 2024 12:00) (66 - 130)  RR: 20 (02 Jun 2024 12:00) (18 - 25)  SpO2: 98% (02 Jun 2024 12:00) (95% - 100%)    Parameters below as of 02 Jun 2024 12:00  Patient On (Oxygen Delivery Method): room air        Physical Exam:  Constitutional: NAD, AAOx3  Cardiovascular: +S1S2 RRR  Pulmonary: CTA b/l, unlabored  GI: soft NTND +BS  Extremities: no pedal edema, +distal pulses b/l  Neuro: non focal, URRUTIA x4    LABS:                        12.8   6.95  )-----------( 188      ( 02 Jun 2024 03:53 )             39.0     06-02    137  |  102  |  21.6<H>  ----------------------------<  105<H>  3.8   |  22.0  |  0.69    Ca    8.7      02 Jun 2024 03:53  Phos  2.8     06-02  Mg     2.0     06-02    TPro  6.6  /  Alb  3.7  /  TBili  0.3<L>  /  DBili  x   /  AST  13  /  ALT  8   /  AlkPhos  60  06-02    PT/INR - ( 31 May 2024 14:25 )   PT: 10.9 sec;   INR: 0.98 ratio         PTT - ( 31 May 2024 14:25 )  PTT:30.7 sec  Urinalysis Basic - ( 02 Jun 2024 03:53 )    Color: x / Appearance: x / SG: x / pH: x  Gluc: 105 mg/dL / Ketone: x  / Bili: x / Urobili: x   Blood: x / Protein: x / Nitrite: x   Leuk Esterase: x / RBC: x / WBC x   Sq Epi: x / Non Sq Epi: x / Bacteria: x        RADIOLOGY & ADDITIONAL TESTS:

## 2024-06-02 NOTE — PROGRESS NOTE ADULT - ASSESSMENT
Pt is an 84 year old female with a history HLD, hypothyroidism, RBBB, mild-mod AI, anxiety, R hip surgery 12/31/23 (fractured hip) complicated by RLE right soleal vein DVT, who presented to [facility] after being sent in by Dr Jones (cardiologist) for concern of heart block on Holter monitor. On arrival patient noted to have 2:1 AV block and then resumption of normal conduction. EF preserved on TTE.     Transferred to the ICU overnight after an episode of transient CHB associated with dizziness and pauses up to 5 seconds. Also NSVT noted. TVP was placed via right IJ.    Recommendations  - Plan for dual chamber PPM tomorrow  - Keep NPO past midnight

## 2024-06-02 NOTE — CONSULT NOTE ADULT - NS PANP COMMENT GEN_ALL_CORE FT
84F with hx of RBBB, hypothyroidism secondary to Hashimoto's thyroiditis, HLD, anxiety, R hip fracture s/p R hemiarthroplasty c/b RLE soleal DVT not on AC referred to the ED 5/31 by her cardiologist who placed her on Holter monitor due to complaint of dyspnea/dizziness for the last month and found to have 28 episodes of CHB. Pt was noted on initial presentation to have 2:1 heart block with RBBB with plan per EP for PPM placement. Serial troponin negative. Echo showed normal EF with mild diastolic dysfunction. Yesterday developed episode of 6 second ventricular standstill and has had episodes of CHB and overnight was noted with additional episodes of ventricular standstill. Pt noted associated dizziness with episodes and described sensation of numbness through the body. Pt upgraded to MICU for closer monitoring and had additional episode of ventricular standstill, later CHB and an episode of NSVT. Cardiology contacted and attempted to reach EP to discuss possible expedited PPM but given recurrent arrhythmias, TVP subsequently placed with appropriate capture. EP updated and daughters updated at the bedside.

## 2024-06-02 NOTE — CONSULT NOTE ADULT - ASSESSMENT
84F PMH hypothyroidism (on no medications), RBBB, anxiety, R hip fracture s/p right hemiarthroplasty 12/31/23 complicated by RLE  DVT (off AC at this time) admitted to ICU for    # Intermittent CHB, 2-1 AVB, Ventricular Stand Still    Neuro:  - No Active issues  - Avoid Neuro Deliriogenic / sedative medications  - Aspiration Precautions, HOB > 30 degrees    CV:  - Continue current antihypertensive regimen with Hydralazine  - Currently in NSR on monitor, occasional CHB, 2-1 AVB and Ventricular stand still max documented 6 secs.   - Pacer Pads on pt at all times, avoid mary alice blockers, no emergent need for TVP at this time  - Tick Panel ordered, pt states she has had ticks in the past    Pulm:  - Supplemental oxygen as needed to maintain SpO2 > 92%,  - Incentive spirometry                  GI:  - regular diet    Renal:  - Preserved Renal Function Continue to monitor Bun/Cr and UOP  - Replacing electrolytes as needed with Goal K> 4, PO> 3, Mg> 2               - Strict I&O's    Heme:  - Lovenox for DVT ppx    ID:  - Microbiology and Radiology reviewed   - trend CBC with diff, CMP  and fever curve  - Avoiding antibiotics unless there is a concern for a bacterial/fungal infection    Endo:  - ISS for aggressive glycemic control to limit FS glucose to < 180mg/dl.  - Keep Euthyroid    Time spent on this patient encounter, which includes documenting this note in the electronic medical record, was 60 minutes including assessing the presenting problems with associated risks, reviewing the medical record to prepare for the encounter, and meeting face to face with the patient to obtain additional history. I have also performed an appropriate physical exam, made interventions listed and ordered and interpreted appropriate diagnostic studies as documented. To improve  communication and patient safety, I have coordinated care with the multidisciplinary team including the bedside nurse, appropriate attending of record and consultants as needed.    Date of entry of this note is equal to the date of services rendered    Plan discussed with Dr SHETH      84F PMH hypothyroidism (on no medications), RBBB, anxiety, R hip fracture s/p right hemiarthroplasty 12/31/23 complicated by RLE  DVT (off AC at this time) admitted to ICU for    # Intermittent CHB, 2-1 AVB, Ventricular Stand Still    Neuro:  - No Active issues  - Avoid Neuro Deliriogenic / sedative medications  - Aspiration Precautions, HOB > 30 degrees    CV:  - Continue current antihypertensive regimen with Hydralazine  - Currently in NSR on monitor, occasional CHB, 2-1 AVB and Ventricular stand still max documented 6 secs.   - Pacer Pads on pt at all times, avoid mary alice blockers, no emergent need for TVP at this time  - Tick Panel ordered, pt states she has had ticks in the past  - Cardio Following  - EP Following Plans for dual chamber ppm on Monday  - TTE normal EF with Grade 1 Diastolic Dysfunction    Pulm:  - Supplemental oxygen as needed to maintain SpO2 > 92%,  - Incentive spirometry                  GI:  - regular diet    Renal:  - Preserved Renal Function Continue to monitor Bun/Cr and UOP  - Replacing electrolytes as needed with Goal K> 4, PO> 3, Mg> 2               - Strict I&O's    Heme:  - Lovenox for DVT ppx    ID:  - Microbiology and Radiology reviewed   - trend CBC with diff, CMP  and fever curve  - Avoiding antibiotics unless there is a concern for a bacterial/fungal infection    Endo:  - ISS for aggressive glycemic control to limit FS glucose to < 180mg/dl.  - Keep Euthyroid    Time spent on this patient encounter, which includes documenting this note in the electronic medical record, was 60 minutes including assessing the presenting problems with associated risks, reviewing the medical record to prepare for the encounter, and meeting face to face with the patient to obtain additional history. I have also performed an appropriate physical exam, made interventions listed and ordered and interpreted appropriate diagnostic studies as documented. To improve  communication and patient safety, I have coordinated care with the multidisciplinary team including the bedside nurse, appropriate attending of record and consultants as needed.    Date of entry of this note is equal to the date of services rendered    Plan discussed with Dr SHETH

## 2024-06-03 ENCOUNTER — TRANSCRIPTION ENCOUNTER (OUTPATIENT)
Age: 84
End: 2024-06-03

## 2024-06-03 LAB
ALBUMIN SERPL ELPH-MCNC: 3.5 G/DL — SIGNIFICANT CHANGE UP (ref 3.3–5.2)
ALP SERPL-CCNC: 60 U/L — SIGNIFICANT CHANGE UP (ref 40–120)
ALT FLD-CCNC: 7 U/L — SIGNIFICANT CHANGE UP
ANION GAP SERPL CALC-SCNC: 12 MMOL/L — SIGNIFICANT CHANGE UP (ref 5–17)
APTT BLD: 175.4 SEC — CRITICAL HIGH (ref 24.5–35.6)
APTT BLD: 95.2 SEC — HIGH (ref 24.5–35.6)
AST SERPL-CCNC: 12 U/L — SIGNIFICANT CHANGE UP
BILIRUB SERPL-MCNC: 0.4 MG/DL — SIGNIFICANT CHANGE UP (ref 0.4–2)
BLD GP AB SCN SERPL QL: SIGNIFICANT CHANGE UP
BUN SERPL-MCNC: 20.5 MG/DL — HIGH (ref 8–20)
CALCIUM SERPL-MCNC: 8.7 MG/DL — SIGNIFICANT CHANGE UP (ref 8.4–10.5)
CHLORIDE SERPL-SCNC: 103 MMOL/L — SIGNIFICANT CHANGE UP (ref 96–108)
CO2 SERPL-SCNC: 21 MMOL/L — LOW (ref 22–29)
CREAT SERPL-MCNC: 0.67 MG/DL — SIGNIFICANT CHANGE UP (ref 0.5–1.3)
EGFR: 86 ML/MIN/1.73M2 — SIGNIFICANT CHANGE UP
GLUCOSE SERPL-MCNC: 114 MG/DL — HIGH (ref 70–99)
HCT VFR BLD CALC: 42.1 % — SIGNIFICANT CHANGE UP (ref 34.5–45)
HGB BLD-MCNC: 13.5 G/DL — SIGNIFICANT CHANGE UP (ref 11.5–15.5)
INR BLD: 1 RATIO — SIGNIFICANT CHANGE UP (ref 0.85–1.18)
MAGNESIUM SERPL-MCNC: 2.1 MG/DL — SIGNIFICANT CHANGE UP (ref 1.6–2.6)
MCHC RBC-ENTMCNC: 26.4 PG — LOW (ref 27–34)
MCHC RBC-ENTMCNC: 32.1 GM/DL — SIGNIFICANT CHANGE UP (ref 32–36)
MCV RBC AUTO: 82.2 FL — SIGNIFICANT CHANGE UP (ref 80–100)
PHOSPHATE SERPL-MCNC: 4.1 MG/DL — SIGNIFICANT CHANGE UP (ref 2.4–4.7)
PLATELET # BLD AUTO: 191 K/UL — SIGNIFICANT CHANGE UP (ref 150–400)
POTASSIUM SERPL-MCNC: 4.1 MMOL/L — SIGNIFICANT CHANGE UP (ref 3.5–5.3)
POTASSIUM SERPL-SCNC: 4.1 MMOL/L — SIGNIFICANT CHANGE UP (ref 3.5–5.3)
PROT SERPL-MCNC: 6.4 G/DL — LOW (ref 6.6–8.7)
PROTHROM AB SERPL-ACNC: 11.1 SEC — SIGNIFICANT CHANGE UP (ref 9.5–13)
RBC # BLD: 5.12 M/UL — SIGNIFICANT CHANGE UP (ref 3.8–5.2)
RBC # FLD: 14.6 % — HIGH (ref 10.3–14.5)
SODIUM SERPL-SCNC: 136 MMOL/L — SIGNIFICANT CHANGE UP (ref 135–145)
WBC # BLD: 7.85 K/UL — SIGNIFICANT CHANGE UP (ref 3.8–10.5)
WBC # FLD AUTO: 7.85 K/UL — SIGNIFICANT CHANGE UP (ref 3.8–10.5)

## 2024-06-03 PROCEDURE — 93010 ELECTROCARDIOGRAM REPORT: CPT

## 2024-06-03 PROCEDURE — 71045 X-RAY EXAM CHEST 1 VIEW: CPT | Mod: 26

## 2024-06-03 PROCEDURE — 99232 SBSQ HOSP IP/OBS MODERATE 35: CPT

## 2024-06-03 PROCEDURE — 99233 SBSQ HOSP IP/OBS HIGH 50: CPT | Mod: GC

## 2024-06-03 PROCEDURE — 71045 X-RAY EXAM CHEST 1 VIEW: CPT | Mod: 26,77

## 2024-06-03 PROCEDURE — 33208 INSRT HEART PM ATRIAL & VENT: CPT | Mod: KX

## 2024-06-03 RX ORDER — DIAZEPAM 5 MG
5 TABLET ORAL ONCE
Refills: 0 | Status: DISCONTINUED | OUTPATIENT
Start: 2024-06-03 | End: 2024-06-03

## 2024-06-03 RX ORDER — CEFAZOLIN SODIUM 1 G
2000 VIAL (EA) INJECTION EVERY 8 HOURS
Refills: 0 | Status: DISCONTINUED | OUTPATIENT
Start: 2024-06-03 | End: 2024-06-03

## 2024-06-03 RX ORDER — APIXABAN 2.5 MG/1
5 TABLET, FILM COATED ORAL EVERY 12 HOURS
Refills: 0 | Status: DISCONTINUED | OUTPATIENT
Start: 2024-06-03 | End: 2024-06-03

## 2024-06-03 RX ORDER — CEFAZOLIN SODIUM 1 G
2000 VIAL (EA) INJECTION EVERY 8 HOURS
Refills: 0 | Status: COMPLETED | OUTPATIENT
Start: 2024-06-03 | End: 2024-06-04

## 2024-06-03 RX ORDER — APIXABAN 2.5 MG/1
10 TABLET, FILM COATED ORAL EVERY 12 HOURS
Refills: 0 | Status: DISCONTINUED | OUTPATIENT
Start: 2024-06-03 | End: 2024-06-04

## 2024-06-03 RX ORDER — METOPROLOL TARTRATE 50 MG
12.5 TABLET ORAL
Refills: 0 | Status: DISCONTINUED | OUTPATIENT
Start: 2024-06-03 | End: 2024-06-04

## 2024-06-03 RX ORDER — ACETAMINOPHEN 500 MG
650 TABLET ORAL ONCE
Refills: 0 | Status: COMPLETED | OUTPATIENT
Start: 2024-06-03 | End: 2024-06-03

## 2024-06-03 RX ORDER — ACETAMINOPHEN 500 MG
1000 TABLET ORAL ONCE
Refills: 0 | Status: COMPLETED | OUTPATIENT
Start: 2024-06-03 | End: 2024-06-03

## 2024-06-03 RX ADMIN — Medication 400 MILLIGRAM(S): at 18:42

## 2024-06-03 RX ADMIN — APIXABAN 10 MILLIGRAM(S): 2.5 TABLET, FILM COATED ORAL at 19:40

## 2024-06-03 RX ADMIN — Medication 5 MILLIGRAM(S): at 12:37

## 2024-06-03 RX ADMIN — Medication 650 MILLIGRAM(S): at 23:21

## 2024-06-03 RX ADMIN — APIXABAN 5 MILLIGRAM(S): 2.5 TABLET, FILM COATED ORAL at 11:02

## 2024-06-03 RX ADMIN — Medication 650 MILLIGRAM(S): at 22:21

## 2024-06-03 RX ADMIN — Medication 5 MILLIGRAM(S): at 14:51

## 2024-06-03 RX ADMIN — Medication 1000 MILLIGRAM(S): at 18:55

## 2024-06-03 RX ADMIN — Medication 12.5 MILLIGRAM(S): at 19:40

## 2024-06-03 RX ADMIN — Medication 2000 MILLIGRAM(S): at 23:11

## 2024-06-03 RX ADMIN — HEPARIN SODIUM 0 UNIT(S)/HR: 5000 INJECTION INTRAVENOUS; SUBCUTANEOUS at 06:10

## 2024-06-03 RX ADMIN — HEPARIN SODIUM 900 UNIT(S)/HR: 5000 INJECTION INTRAVENOUS; SUBCUTANEOUS at 07:17

## 2024-06-03 RX ADMIN — CHLORHEXIDINE GLUCONATE 1 APPLICATION(S): 213 SOLUTION TOPICAL at 05:52

## 2024-06-03 NOTE — DISCHARGE NOTE PROVIDER - CARE PROVIDER_API CALL
Lazarus Green [facility]  Cardiac Electrophysiology  [address], [address]  [place] [address]  Phone: [phone]  Fax: [phone]  Follow Up Time:    Lazarus Green [facility]  Cardiac Electrophysiology  [address], [address]  [place] [address]  Phone: [phone]  Fax: [phone]  Follow Up Time: 1 week    Gabbie Ribera  [facility]  [address] [place] [address]  Phone: [phone]  Fax: [phone]  Established Patient  Follow Up Time: 1 week    Tez Harris  Vascular Surgery  [address]  [place] [address]  Phone: [phone]  Fax: [phone]  Established Patient  Follow Up Time: 2 weeks

## 2024-06-03 NOTE — DISCHARGE NOTE PROVIDER - HOSPITAL COURSE
84yoF hx HLD, hypothyroidism, R-hip pain (12/2023) complicated by RLE DVT, s/p AC sent to hospital by outpatient cardiology for concern for heart block on Holter monitor.  On arrival to hospital, pt noted to have 2:1 AV block.  Course complicated by transient CHF associated with dizziness and sinus pauses up to 5 seconds prompting transfer to MICU.  Pt received TVP while in MICU then underwent PPM on 6/3. Post procedure, she was monitored closely. Device was interrogated. Cleared by EP for discharge.   During hospitalization, she was found to have RLE DVT, started on Eliquis. She is feeling better and is stable for discharge.

## 2024-06-03 NOTE — PROGRESS NOTE ADULT - ASSESSMENT
84yoF hx HLD, hypothyroidism, R-hip pain (12/2023) complicated by RLE DVT, s/p AC sent to hospital by outpatient cardiology for concern for heart block on Holter monitor, initially found to be in 2:1 AV block w/ course complicated by transient CHB and symptomatic sinus pauses requiring TVP and PPM    AV block, w/ transient CHB and symptomatic pauses   -s/p TVP in MICU, now removed and s/p PPM on 6/3  -Cefazolin x 2 more doses  -CXR and device check in AM  -Transfer from MICU to telemetry   -EP following, per most recent note, possible d/c home in AM pending status overnight     Atrial tachycardia  -Episodes of HR in 120s  -Started on metoprolol 12.5mg BID as per EP    Acute RLE DVT  -DVT in R-gastrocnemius vein, thrombophlebitis in L-thigh saphenous vein  -Started on Eliquis,  EP okay w/ start on AC    Hx HTN  -Elevated SBP prior to PPM  -Started on hydralazine 50mg TID,  -Has not received any doses due to higher hold parameters () since 6/2  -Since SBP is soft, will d/c hydralazine     Hx chronic pain s/p R hip surgery  -Tylenol PRN. pt declines to take other analgesics     Hx HLD, hypothyroidism  -Not on any meds; reports general intolerance to meds    Prophylactic measure  -On Eliquis

## 2024-06-03 NOTE — DIETITIAN INITIAL EVALUATION ADULT - ORAL INTAKE PTA/DIET HISTORY
Pt is NPO for plan for PPM today. Previously on Regular diet (no beef, no pork). Current weight 150 lbs. Per HIE weights: 11/26/23 134 lbs; monitor trends. RD to follow up at more appropriate time for nutrition history/appropriate diet education.

## 2024-06-03 NOTE — DISCHARGE NOTE PROVIDER - CARE PROVIDERS DIRECT ADDRESSES
,[email or web address]

## 2024-06-03 NOTE — DISCHARGE NOTE PROVIDER - NSDCMRMEDTOKEN_GEN_ALL_CORE_FT
acetaminophen 325 mg oral tablet: 2 tab(s) orally every 6 hours as needed for Pain  apixaban 5 mg oral tablet: 1 tab(s) orally every 12 hours 2 tabs twice a day for 6 days then 1 tab twice a day  Metoprolol Tartrate 25 mg oral tablet: 0.5 tab(s) orally 2 times a day

## 2024-06-03 NOTE — DIETITIAN INITIAL EVALUATION ADULT - ADD RECOMMEND
Re-instate diet when medically feasible  Diet education as feasible/appropriate on follow up  Monitor nutrition related labs, weight trends and BMs

## 2024-06-03 NOTE — PROGRESS NOTE ADULT - SUBJECTIVE AND OBJECTIVE BOX
Patient is a 84y old  Female who presents with a chief complaint of Bradycardia     (03 Jun 2024 12:49)    BRIEF HOSPITAL COURSE: 84yoF hx HLD, hypothyroidism, R-hip pain (12/2023) complicated by RLE DVT, s/p AC sent to hospital by outpatient cardiology for concern for heart block on Holter monitor.  On arrival to hospital, pt noted to have 2:1 AV block.  Course complicated by transient CHF associated with dizziness and sinus pauses up to 5 seconds prompting transfer to MICU.  Pt received TVP while in MICU then underwent PPM on 6/3.      Pt seen and examined; reports some mild to moderate pain at PPM insertion site and chronic hip pain, which she only wants to take Tylenol for as she does not like to take medications in general due to her hx of allergies and intolerances.  Pt reports resolution of dizziness s/p PPM.    MEDICATIONS  (STANDING):  apixaban 10 milliGRAM(s) Oral every 12 hours  ceFAZolin  Injectable. 2000 milliGRAM(s) IV Push every 8 hours  chlorhexidine 2% Cloths 1 Application(s) Topical <User Schedule>  metoprolol tartrate 12.5 milliGRAM(s) Oral two times a day    MEDICATIONS  (PRN):  acetaminophen     Tablet .. 650 milliGRAM(s) Oral every 6 hours PRN Mild Pain (1 - 3), Moderate Pain (4 - 6), Severe Pain (7 - 10)  atropine Injectable 0.5 milliGRAM(s) IV Push once PRN if symptomatic bradycardia noted <30 bpm      Allergies: Allergies    Advil (Swelling)  codeine (Swelling)  mycins (Swelling)  Excedrin (Swelling)  Cipro (Swelling)    Intolerances    REVIEW OF SYSTEMS:  CONSTITUTIONAL: No fever, weight loss, or fatigue  EYES: No eye pain, visual disturbances, or discharge  ENMT:  No difficulty hearing, tinnitus, vertigo; No sinus or throat pain  NECK: No pain or stiffness  CHEST: LEFT CHEST WALL PAIN AT PPM SURGICAL SITE  RESPIRATORY: No cough, wheezing, chills or hemoptysis; No shortness of breath  CARDIOVASCULAR: No chest pain, palpitations, dizziness, or leg swelling  GASTROINTESTINAL: No abdominal or epigastric pain. No nausea, vomiting, or hematemesis; No diarrhea or constipation. No melena or hematochezia.  GENITOURINARY: No dysuria, frequency, hematuria, or incontinence  NEUROLOGICAL: No headaches, memory loss, loss of strength, numbness, or tremors  SKIN: No itching, burning, rashes, or lesions   LYMPH NODES: No enlarged glands  ENDOCRINE: No heat or cold intolerance; No hair loss  MUSCULOSKELETAL: RIGHT HIP PAIN  PSYCHIATRIC: No depression, anxiety, mood swings, or difficulty sleeping  HEME/LYMPH: No easy bruising, or bleeding gums      T(C): 36.4 (06-04-24 @ 00:00), Max: 37.2 (06-03-24 @ 14:58)  HR: 68 (06-04-24 @ 01:00) (40 - 122)  BP: 139/53 (06-04-24 @ 01:00) (93/71 - 169/52)  RR: 21 (06-04-24 @ 01:00) (15 - 26)  SpO2: 99% (06-04-24 @ 01:00) (92% - 100%)  Wt(kg): --Vital Signs Last 24 Hrs  T(C): 36.4 (04 Jun 2024 00:00), Max: 37.2 (03 Jun 2024 14:58)  T(F): 97.6 (04 Jun 2024 00:00), Max: 98.9 (03 Jun 2024 14:58)  HR: 68 (04 Jun 2024 01:00) (40 - 122)  BP: 139/53 (04 Jun 2024 01:00) (93/71 - 169/52)  BP(mean): 75 (04 Jun 2024 01:00) (56 - 123)  RR: 21 (04 Jun 2024 01:00) (15 - 26)  SpO2: 99% (04 Jun 2024 01:00) (92% - 100%)    Parameters below as of 04 Jun 2024 00:00  Patient On (Oxygen Delivery Method): nasal cannula  O2 Flow (L/min): 2    I&O's Summary    02 Jun 2024 07:01  -  03 Jun 2024 07:00  --------------------------------------------------------  IN: 773 mL / OUT: 1000 mL / NET: -227 mL    03 Jun 2024 07:01  -  04 Jun 2024 02:24  --------------------------------------------------------  IN: 547 mL / OUT: 1000 mL / NET: -453 mL      PHYSICAL EXAM:  GENERAL:  Well-appearing elderly female, not in acute distress  EYES:  Clear conjunctiva extraocular movement intact  ENT: Moist mucous membranes  CHEST WALL:  PPM covered in clean dressing  RESP:  Non-labored breathing pattern, lungs clear to ausculation in anterior fields  CV: Regular rate and rhythm, no murmurs appreciated, no lower extremity edema  GI: Soft, non-tender, non-distended  MSK: LUE In sling  NEURO: Awake, alert, conversant, upper and lower extremity strength 5/5, light touch sensation grossly intact  PSYCH: Calm, cooperative  SKIN: No rash or lesions, warm and dry    Consultant(s) Notes Reviewed:  [x ] YES  [ ] NO      LABS:                        13.5   7.85  )-----------( 191      ( 03 Jun 2024 05:00 )             42.1     06-03    136  |  103  |  20.5<H>  ----------------------------<  114<H>  4.1   |  21.0<L>  |  0.67    Ca    8.7      03 Jun 2024 05:00  Phos  4.1     06-03  Mg     2.1     06-03    TPro  6.4<L>  /  Alb  3.5  /  TBili  0.4  /  DBili  x   /  AST  12  /  ALT  7   /  AlkPhos  60  06-03    PT/INR - ( 03 Jun 2024 05:00 )   PT: 11.1 sec;   INR: 1.00 ratio         PTT - ( 03 Jun 2024 13:09 )  PTT:95.2 sec  Urinalysis Basic - ( 03 Jun 2024 05:00 )    Color: x / Appearance: x / SG: x / pH: x  Gluc: 114 mg/dL / Ketone: x  / Bili: x / Urobili: x   Blood: x / Protein: x / Nitrite: x   Leuk Esterase: x / RBC: x / WBC x   Sq Epi: x / Non Sq Epi: x / Bacteria: x    Urinalysis Basic - ( 03 Jun 2024 05:00 )    Color: x / Appearance: x / SG: x / pH: x  Gluc: 114 mg/dL / Ketone: x  / Bili: x / Urobili: x   Blood: x / Protein: x / Nitrite: x   Leuk Esterase: x / RBC: x / WBC x   Sq Epi: x / Non Sq Epi: x / Bacteria: x        RADIOLOGY & ADDITIONAL TESTS:  Reviewed.  *Pt seen and examined prior to midnight*    MICU DOWNGRADE NOTE    Patient is a 84y old  Female who presents with a chief complaint of Bradycardia     (03 Jun 2024 12:49)    BRIEF HOSPITAL COURSE: 84yoF hx HLD, hypothyroidism, R-hip pain (12/2023) complicated by RLE DVT, s/p AC sent to hospital by outpatient cardiology for concern for heart block on Holter monitor.  On arrival to hospital, pt noted to have 2:1 AV block.  Course complicated by transient CHF associated with dizziness and sinus pauses up to 5 seconds prompting transfer to MICU.  Pt received TVP while in MICU then underwent PPM on 6/3.      Pt seen and examined; reports some mild to moderate pain at PPM insertion site and chronic hip pain, which she only wants to take Tylenol for as she does not like to take medications in general due to her hx of allergies and intolerances.  Pt reports resolution of dizziness s/p PPM.    MEDICATIONS  (STANDING):  apixaban 10 milliGRAM(s) Oral every 12 hours  ceFAZolin  Injectable. 2000 milliGRAM(s) IV Push every 8 hours  chlorhexidine 2% Cloths 1 Application(s) Topical <User Schedule>  metoprolol tartrate 12.5 milliGRAM(s) Oral two times a day    MEDICATIONS  (PRN):  acetaminophen     Tablet .. 650 milliGRAM(s) Oral every 6 hours PRN Mild Pain (1 - 3), Moderate Pain (4 - 6), Severe Pain (7 - 10)  atropine Injectable 0.5 milliGRAM(s) IV Push once PRN if symptomatic bradycardia noted <30 bpm      Allergies: Allergies    Advil (Swelling)  codeine (Swelling)  mycins (Swelling)  Excedrin (Swelling)  Cipro (Swelling)    Intolerances    REVIEW OF SYSTEMS:  CONSTITUTIONAL: No fever, weight loss, or fatigue  EYES: No eye pain, visual disturbances, or discharge  ENMT:  No difficulty hearing, tinnitus, vertigo; No sinus or throat pain  NECK: No pain or stiffness  CHEST: LEFT CHEST WALL PAIN AT PPM SURGICAL SITE  RESPIRATORY: No cough, wheezing, chills or hemoptysis; No shortness of breath  CARDIOVASCULAR: No chest pain, palpitations, dizziness, or leg swelling  GASTROINTESTINAL: No abdominal or epigastric pain. No nausea, vomiting, or hematemesis; No diarrhea or constipation. No melena or hematochezia.  GENITOURINARY: No dysuria, frequency, hematuria, or incontinence  NEUROLOGICAL: No headaches, memory loss, loss of strength, numbness, or tremors  SKIN: No itching, burning, rashes, or lesions   LYMPH NODES: No enlarged glands  ENDOCRINE: No heat or cold intolerance; No hair loss  MUSCULOSKELETAL: RIGHT HIP PAIN  PSYCHIATRIC: No depression, anxiety, mood swings, or difficulty sleeping  HEME/LYMPH: No easy bruising, or bleeding gums      T(C): 36.4 (06-04-24 @ 00:00), Max: 37.2 (06-03-24 @ 14:58)  HR: 68 (06-04-24 @ 01:00) (40 - 122)  BP: 139/53 (06-04-24 @ 01:00) (93/71 - 169/52)  RR: 21 (06-04-24 @ 01:00) (15 - 26)  SpO2: 99% (06-04-24 @ 01:00) (92% - 100%)  Wt(kg): --Vital Signs Last 24 Hrs  T(C): 36.4 (04 Jun 2024 00:00), Max: 37.2 (03 Jun 2024 14:58)  T(F): 97.6 (04 Jun 2024 00:00), Max: 98.9 (03 Jun 2024 14:58)  HR: 68 (04 Jun 2024 01:00) (40 - 122)  BP: 139/53 (04 Jun 2024 01:00) (93/71 - 169/52)  BP(mean): 75 (04 Jun 2024 01:00) (56 - 123)  RR: 21 (04 Jun 2024 01:00) (15 - 26)  SpO2: 99% (04 Jun 2024 01:00) (92% - 100%)    Parameters below as of 04 Jun 2024 00:00  Patient On (Oxygen Delivery Method): nasal cannula  O2 Flow (L/min): 2    I&O's Summary    02 Jun 2024 07:01  -  03 Jun 2024 07:00  --------------------------------------------------------  IN: 773 mL / OUT: 1000 mL / NET: -227 mL    03 Jun 2024 07:01  -  04 Jun 2024 02:24  --------------------------------------------------------  IN: 547 mL / OUT: 1000 mL / NET: -453 mL      PHYSICAL EXAM:  GENERAL:  Well-appearing elderly female, not in acute distress  EYES:  Clear conjunctiva extraocular movement intact  ENT: Moist mucous membranes  CHEST WALL:  PPM covered in clean dressing  RESP:  Non-labored breathing pattern, lungs clear to ausculation in anterior fields  CV: Regular rate and rhythm, no murmurs appreciated, no lower extremity edema  GI: Soft, non-tender, non-distended  MSK: LUE In sling  NEURO: Awake, alert, conversant, upper and lower extremity strength 5/5, light touch sensation grossly intact  PSYCH: Calm, cooperative  SKIN: No rash or lesions, warm and dry    Consultant(s) Notes Reviewed:  [x ] YES  [ ] NO      LABS:                        13.5   7.85  )-----------( 191      ( 03 Jun 2024 05:00 )             42.1     06-03    136  |  103  |  20.5<H>  ----------------------------<  114<H>  4.1   |  21.0<L>  |  0.67    Ca    8.7      03 Jun 2024 05:00  Phos  4.1     06-03  Mg     2.1     06-03    TPro  6.4<L>  /  Alb  3.5  /  TBili  0.4  /  DBili  x   /  AST  12  /  ALT  7   /  AlkPhos  60  06-03    PT/INR - ( 03 Jun 2024 05:00 )   PT: 11.1 sec;   INR: 1.00 ratio         PTT - ( 03 Jun 2024 13:09 )  PTT:95.2 sec  Urinalysis Basic - ( 03 Jun 2024 05:00 )    Color: x / Appearance: x / SG: x / pH: x  Gluc: 114 mg/dL / Ketone: x  / Bili: x / Urobili: x   Blood: x / Protein: x / Nitrite: x   Leuk Esterase: x / RBC: x / WBC x   Sq Epi: x / Non Sq Epi: x / Bacteria: x    Urinalysis Basic - ( 03 Jun 2024 05:00 )    Color: x / Appearance: x / SG: x / pH: x  Gluc: 114 mg/dL / Ketone: x  / Bili: x / Urobili: x   Blood: x / Protein: x / Nitrite: x   Leuk Esterase: x / RBC: x / WBC x   Sq Epi: x / Non Sq Epi: x / Bacteria: x        RADIOLOGY & ADDITIONAL TESTS:  Reviewed.

## 2024-06-03 NOTE — DISCHARGE NOTE PROVIDER - ATTENDING DISCHARGE PHYSICAL EXAMINATION:
Vital Signs   T(C): 36.5 (04 Jun 2024 12:05), Max: 37.2 (03 Jun 2024 14:58)  T(F): 97.7 (04 Jun 2024 12:05), Max: 98.9 (03 Jun 2024 14:58)  HR: 84 (04 Jun 2024 13:00) (40 - 122)  BP: 116/50 (04 Jun 2024 13:00) (93/71 - 169/52)  BP(mean): 70 (04 Jun 2024 13:00) (65 - 90)  RR: 20 (04 Jun 2024 13:00) (14 - 21)  SpO2: 96% (04 Jun 2024 13:00) (92% - 100%)  Parameters below as of 04 Jun 2024 13:00  Patient On (Oxygen Delivery Method): room air  General: Elderly female sitting in chair comfortably. No acute distress  HEENT: EOMI. Clear conjunctivae. Moist mucus membrane  Neck: Supple.   Chest: Good air entry. No wheezing, rales or rhonchi. Steri-strips on pacemaker site in left upper chest.   Heart: S1 & S2 with RRR.  Abdomen: Non distended. Soft. Non-tender. + BS  Ext: No pedal edema. No calf tenderness   Neuro: Active and alert. No focal deficit. No speech disorder.  Skin: Warm and Dry  Psychiatry: Normal mood and affect

## 2024-06-03 NOTE — DISCHARGE NOTE PROVIDER - PROVIDER TOKENS
PROVIDER:[TOKEN:[[id number]]] PROVIDER:[TOKEN:[[id number]],FOLLOWUP:[1 week]],PROVIDER:[TOKEN:[[id number]],FOLLOWUP:[1 week],ESTABLISHEDPATIENT:[T]],PROVIDER:[TOKEN:[[id number]],FOLLOWUP:[2 weeks],ESTABLISHEDPATIENT:[T]]

## 2024-06-03 NOTE — DISCHARGE NOTE PROVIDER - NSDCFUADDAPPT_GEN_ALL_CORE_FT
Dr. Green's office will contact you in 3-5 days to schedule this appointment. Please call [phone] with questions or concerns.

## 2024-06-03 NOTE — PROGRESS NOTE ADULT - SUBJECTIVE AND OBJECTIVE BOX
SUBJECTIVE:    Reason for admission to MICU:     BRIEF HOSPITAL COURSE:  84F with PMHx of hypothyroidism (on no medications), RBBB, anxiety, R hip fracture s/p right hemiarthroplasty 12/31/23 complicated by RLE  DVT (off AC at this time) who was sent to [facility] ER by her cardiologist Dr. Delfina Jones for bradycardia. Pt was experiencing episodes of SOB and dizziness on exertion s/p holter which revealed 28 episodes of 2nd degree Mobitz II and 3rd lasting 30mins and brief SVTs. In ED pt would in sinus bradycardia with 2:1 AVB, HR in 40s)    Plan for PPM today by EP.      INTERVAL HPI/LAST 24HR EVENTS: Patient seen and examined at bedside at AM. No clinically acute event overnight.        MEDICATIONS  (STANDING):  apixaban 5 milliGRAM(s) Oral every 12 hours  chlorhexidine 2% Cloths 1 Application(s) Topical <User Schedule>  heparin  Infusion.  Unit(s)/Hr (11 mL/Hr) IV Continuous <Continuous>  hydrALAZINE 50 milliGRAM(s) Oral three times a day    MEDICATIONS  (PRN):  acetaminophen     Tablet .. 650 milliGRAM(s) Oral every 6 hours PRN Mild Pain (1 - 3), Moderate Pain (4 - 6), Severe Pain (7 - 10)  atropine Injectable 0.5 milliGRAM(s) IV Push once PRN if symptomatic bradycardia noted <30 bpm  heparin   Injectable 4500 Unit(s) IV Push every 6 hours PRN For aPTT less than 40  heparin   Injectable 2000 Unit(s) IV Push every 6 hours PRN For aPTT between 40 - 57  hydrALAZINE Injectable 10 milliGRAM(s) IV Push every 2 hours PRN give for SBP > 150      Allergies    Advil (Swelling)  codeine (Swelling)  mycins (Swelling)  Excedrin (Swelling)  Cipro (Swelling)    Intolerances        REVIEW OF SYSTEMS:  CONSTITUTIONAL: No fever, or fatigue  RESPIRATORY: No cough, wheezing, chills or hemoptysis; No shortness of breath  CARDIOVASCULAR: No chest pain, palpitations, dizziness, or leg swelling  GASTROINTESTINAL: No abdominal or epigastric pain. No nausea, vomiting, or hematemesis; No diarrhea or constipation. No melena or hematochezia.  NEUROLOGICAL: No headaches, loss of strength, numbness, or tremors  MUSCULOSKELETAL: No joint pain or swelling; No muscle, back, or extremity pain    OBJECTIVE:    Vital Signs Last 24 Hrs  T(C): 36.8 (03 Jun 2024 08:00), Max: 36.8 (02 Jun 2024 23:00)  T(F): 98.2 (03 Jun 2024 08:00), Max: 98.2 (02 Jun 2024 23:00)  HR: 70 (03 Jun 2024 07:00) (64 - 93)  BP: 115/56 (03 Jun 2024 07:00) (85/61 - 143/60)  BP(mean): 74 (03 Jun 2024 07:00) (66 - 123)  RR: 19 (03 Jun 2024 07:00) (16 - 29)  SpO2: 100% (03 Jun 2024 07:00) (95% - 100%)    Parameters below as of 03 Jun 2024 03:00  Patient On (Oxygen Delivery Method): room air        PHYSICAL EXAM:  Constitutional: Alert, interactive, in no acute distress  Head and Face: Atraumatic, head and face were normal in appearance  Eyes: Sclera and conjunctiva were normal, pupils were equal in size, round, eyelids normal  ENT: Ears and nose were normal in appearance  Neck: Appearance was normal, neck was supple, No JVD  Pulmonary: Clear to auscultation bilaterally, no rales/crackles, or wheezing  Heart: Regular rate and rhythm, no murmurs, gallops, or pericardial rubs  Abdominal: Soft, nontender, nondistended. Bowel sounds normal  Skin: Normal color and intact without appreciable rash or abnormal skin lesion  Extremities: Warm without edema. No clubbing.  Pulse: 2+ radial pulse  Neuro: Oriented to person, place, and time    Lab/ Imaging:    LABS:                        13.5   7.85  )-----------( 191      ( 03 Jun 2024 05:00 )             42.1     06-03    136  |  103  |  20.5<H>  ----------------------------<  114<H>  4.1   |  21.0<L>  |  0.67    Ca    8.7      03 Jun 2024 05:00  Phos  4.1     06-03  Mg     2.1     06-03    TPro  6.4<L>  /  Alb  3.5  /  TBili  0.4  /  DBili  x   /  AST  12  /  ALT  7   /  AlkPhos  60  06-03    PT/INR - ( 03 Jun 2024 05:00 )   PT: 11.1 sec;   INR: 1.00 ratio         PTT - ( 03 Jun 2024 05:00 )  PTT:175.4 sec  Urinalysis Basic - ( 03 Jun 2024 05:00 )    Color: x / Appearance: x / SG: x / pH: x  Gluc: 114 mg/dL / Ketone: x  / Bili: x / Urobili: x   Blood: x / Protein: x / Nitrite: x   Leuk Esterase: x / RBC: x / WBC x   Sq Epi: x / Non Sq Epi: x / Bacteria: x      CAPILLARY BLOOD GLUCOSE          RADIOLOGY & ADDITIONAL TESTS:        Imaging Personally Reviewed:  [ ] YES  [ ] NO    Consultant(s) Notes Reviewed:  [ ] YES  [ ] NO    Care Discussed with Consultants/Other Providers [ ] YES  [ ] NO    Plan of Care discussed with Housestaff [ ]YES [ ] NO SUBJECTIVE:    Reason for admission to MICU:     BRIEF HOSPITAL COURSE:  84F with PMHx of hypothyroidism (on no medications), RBBB, anxiety, R hip fracture s/p right hemiarthroplasty 12/31/23 complicated by RLE  DVT (off AC at this time) who was sent to [facility] ER by her cardiologist Dr. Delfina Jones for bradycardia. Pt was experiencing episodes of SOB and dizziness on exertion s/p holter which revealed 28 episodes of 2nd degree Mobitz II and 3rd lasting 30mins and brief SVTs. In ED pt would in sinus bradycardia with 2:1 AVB, HR in 40s)    Plan for PPM today by EP.    INTERVAL HPI/LAST 24HR EVENTS: Patient seen and examined at bedside at AM. No clinically acute event overnight. B/L LE US found to have below knee DVT.       MEDICATIONS  (STANDING):  apixaban 5 milliGRAM(s) Oral every 12 hours  chlorhexidine 2% Cloths 1 Application(s) Topical <User Schedule>  heparin  Infusion.  Unit(s)/Hr (11 mL/Hr) IV Continuous <Continuous>  hydrALAZINE 50 milliGRAM(s) Oral three times a day    MEDICATIONS  (PRN):  acetaminophen     Tablet .. 650 milliGRAM(s) Oral every 6 hours PRN Mild Pain (1 - 3), Moderate Pain (4 - 6), Severe Pain (7 - 10)  atropine Injectable 0.5 milliGRAM(s) IV Push once PRN if symptomatic bradycardia noted <30 bpm  heparin   Injectable 4500 Unit(s) IV Push every 6 hours PRN For aPTT less than 40  heparin   Injectable 2000 Unit(s) IV Push every 6 hours PRN For aPTT between 40 - 57  hydrALAZINE Injectable 10 milliGRAM(s) IV Push every 2 hours PRN give for SBP > 150      Allergies    Advil (Swelling)  codeine (Swelling)  mycins (Swelling)  Excedrin (Swelling)  Cipro (Swelling)    Intolerances        REVIEW OF SYSTEMS:  CONSTITUTIONAL: No fever, or fatigue  RESPIRATORY: No cough, wheezing, chills or hemoptysis; No shortness of breath  CARDIOVASCULAR: No chest pain, palpitations, dizziness, or leg swelling  GASTROINTESTINAL: No abdominal or epigastric pain. No nausea, vomiting, or hematemesis; No diarrhea or constipation. No melena or hematochezia.  NEUROLOGICAL: No headaches, loss of strength, numbness, or tremors  MUSCULOSKELETAL: No joint pain or swelling; No muscle, back, or extremity pain    OBJECTIVE:    Vital Signs Last 24 Hrs  T(C): 36.8 (03 Jun 2024 08:00), Max: 36.8 (02 Jun 2024 23:00)  T(F): 98.2 (03 Jun 2024 08:00), Max: 98.2 (02 Jun 2024 23:00)  HR: 70 (03 Jun 2024 07:00) (64 - 93)  BP: 115/56 (03 Jun 2024 07:00) (85/61 - 143/60)  BP(mean): 74 (03 Jun 2024 07:00) (66 - 123)  RR: 19 (03 Jun 2024 07:00) (16 - 29)  SpO2: 100% (03 Jun 2024 07:00) (95% - 100%)    Parameters below as of 03 Jun 2024 03:00  Patient On (Oxygen Delivery Method): room air        PHYSICAL EXAM:  Constitutional: Alert, interactive, in no acute distress  Head and Face: Atraumatic, head and face were normal in appearance  Eyes: Sclera and conjunctiva were normal, pupils were equal in size, round, eyelids normal  ENT: Ears and nose were normal in appearance  Neck: Appearance was normal, neck was supple, No JVD  Pulmonary: Clear to auscultation bilaterally, no rales/crackles, or wheezing  Heart: Regular rate and rhythm, no murmurs, gallops, or pericardial rubs  Abdominal: Soft, nontender, nondistended. Bowel sounds normal  Skin: Normal color and intact without appreciable rash or abnormal skin lesion  Extremities: Warm without edema. No clubbing.  Pulse: 2+ radial pulse  Neuro: Oriented to person, place, and time    Lab/ Imaging:    LABS:                        13.5   7.85  )-----------( 191      ( 03 Jun 2024 05:00 )             42.1     06-03    136  |  103  |  20.5<H>  ----------------------------<  114<H>  4.1   |  21.0<L>  |  0.67    Ca    8.7      03 Jun 2024 05:00  Phos  4.1     06-03  Mg     2.1     06-03    TPro  6.4<L>  /  Alb  3.5  /  TBili  0.4  /  DBili  x   /  AST  12  /  ALT  7   /  AlkPhos  60  06-03    PT/INR - ( 03 Jun 2024 05:00 )   PT: 11.1 sec;   INR: 1.00 ratio         PTT - ( 03 Jun 2024 05:00 )  PTT:175.4 sec  Urinalysis Basic - ( 03 Jun 2024 05:00 )    Color: x / Appearance: x / SG: x / pH: x  Gluc: 114 mg/dL / Ketone: x  / Bili: x / Urobili: x   Blood: x / Protein: x / Nitrite: x   Leuk Esterase: x / RBC: x / WBC x   Sq Epi: x / Non Sq Epi: x / Bacteria: x      CAPILLARY BLOOD GLUCOSE          RADIOLOGY & ADDITIONAL TESTS:      < from: US Duplex Venous Lower Ext Complete, Bilateral (06.02.24 @ 13:40) >  IMPRESSION:  Deep venous thrombosis right gastrocnemius vein; below the knee DVT.    Superficial thrombophlebitis left upper thigh in a branch of the greater   saphenous vein.  Please see above comments.    < end of copied text >   SUBJECTIVE:    Reason for admission to MICU:     BRIEF HOSPITAL COURSE:  84F with PMHx of hypothyroidism (on no medications), RBBB, anxiety, R hip fracture s/p right hemiarthroplasty 12/31/23 complicated by RLE  DVT (off AC at this time) who was sent to [facility] ER by her cardiologist Dr. Delfina Jones for bradycardia. Pt was experiencing episodes of SOB and dizziness on exertion s/p holter which revealed 28 episodes of 2nd degree Mobitz II and 3rd lasting 30mins and brief SVTs. In ED pt would in sinus bradycardia with 2:1 AVB, HR in 40s)    Plan for PPM today by EP.    INTERVAL HPI/LAST 24HR EVENTS: Patient seen and examined at bedside at AM. No clinically acute event overnight. B/L LE US found to have below knee DVT.       MEDICATIONS  (STANDING):  apixaban 5 milliGRAM(s) Oral every 12 hours  chlorhexidine 2% Cloths 1 Application(s) Topical <User Schedule>  heparin  Infusion.  Unit(s)/Hr (11 mL/Hr) IV Continuous <Continuous>  hydrALAZINE 50 milliGRAM(s) Oral three times a day    MEDICATIONS  (PRN):  acetaminophen     Tablet .. 650 milliGRAM(s) Oral every 6 hours PRN Mild Pain (1 - 3), Moderate Pain (4 - 6), Severe Pain (7 - 10)  atropine Injectable 0.5 milliGRAM(s) IV Push once PRN if symptomatic bradycardia noted <30 bpm  heparin   Injectable 4500 Unit(s) IV Push every 6 hours PRN For aPTT less than 40  heparin   Injectable 2000 Unit(s) IV Push every 6 hours PRN For aPTT between 40 - 57  hydrALAZINE Injectable 10 milliGRAM(s) IV Push every 2 hours PRN give for SBP > 150      Allergies    Advil (Swelling)  codeine (Swelling)  mycins (Swelling)  Excedrin (Swelling)  Cipro (Swelling)    Intolerances        REVIEW OF SYSTEMS:  CONSTITUTIONAL: No fever, or fatigue  RESPIRATORY: No cough, wheezing, chills or hemoptysis; No shortness of breath  CARDIOVASCULAR: No chest pain, palpitations, dizziness, or leg swelling  GASTROINTESTINAL: No abdominal or epigastric pain. No nausea, vomiting, or hematemesis; No diarrhea or constipation. No melena or hematochezia.  NEUROLOGICAL: No headaches, loss of strength, numbness, or tremors  MUSCULOSKELETAL: No joint pain or swelling; No muscle, back, or extremity pain    OBJECTIVE:    Vital Signs Last 24 Hrs  T(C): 36.8 (03 Jun 2024 08:00), Max: 36.8 (02 Jun 2024 23:00)  T(F): 98.2 (03 Jun 2024 08:00), Max: 98.2 (02 Jun 2024 23:00)  HR: 70 (03 Jun 2024 07:00) (64 - 93)  BP: 115/56 (03 Jun 2024 07:00) (85/61 - 143/60)  BP(mean): 74 (03 Jun 2024 07:00) (66 - 123)  RR: 19 (03 Jun 2024 07:00) (16 - 29)  SpO2: 100% (03 Jun 2024 07:00) (95% - 100%)    Parameters below as of 03 Jun 2024 03:00  Patient On (Oxygen Delivery Method): room air        PHYSICAL EXAM:  Constitutional: Alert, interactive, in no acute distress  Head and Face: Atraumatic, head and face were normal in appearance  Eyes: Sclera and conjunctiva were normal, pupils were equal in size, round, eyelids normal  ENT: Ears and nose were normal in appearance  Neck: Appearance was normal, neck was supple, No JVD  Pulmonary: Clear to auscultation bilaterally, no rales/crackles, or wheezing  Heart: Bradycardiac and normal rhythm, no murmurs, gallops, or pericardial rubs  Abdominal: Soft, nontender, nondistended. Bowel sounds normal  Skin: Normal color and intact without appreciable rash or abnormal skin lesion  Extremities: Warm without edema. No clubbing.  Pulse: 2+ radial pulse  Neuro: Oriented to person, place, and time    Lab/ Imaging:    LABS:                        13.5   7.85  )-----------( 191      ( 03 Jun 2024 05:00 )             42.1     06-03    136  |  103  |  20.5<H>  ----------------------------<  114<H>  4.1   |  21.0<L>  |  0.67    Ca    8.7      03 Jun 2024 05:00  Phos  4.1     06-03  Mg     2.1     06-03    TPro  6.4<L>  /  Alb  3.5  /  TBili  0.4  /  DBili  x   /  AST  12  /  ALT  7   /  AlkPhos  60  06-03    PT/INR - ( 03 Jun 2024 05:00 )   PT: 11.1 sec;   INR: 1.00 ratio         PTT - ( 03 Jun 2024 05:00 )  PTT:175.4 sec  Urinalysis Basic - ( 03 Jun 2024 05:00 )    Color: x / Appearance: x / SG: x / pH: x  Gluc: 114 mg/dL / Ketone: x  / Bili: x / Urobili: x   Blood: x / Protein: x / Nitrite: x   Leuk Esterase: x / RBC: x / WBC x   Sq Epi: x / Non Sq Epi: x / Bacteria: x      CAPILLARY BLOOD GLUCOSE          RADIOLOGY & ADDITIONAL TESTS:      < from: US Duplex Venous Lower Ext Complete, Bilateral (06.02.24 @ 13:40) >  IMPRESSION:  Deep venous thrombosis right gastrocnemius vein; below the knee DVT.    Superficial thrombophlebitis left upper thigh in a branch of the greater   saphenous vein.  Please see above comments.    < end of copied text >

## 2024-06-03 NOTE — DISCHARGE NOTE PROVIDER - NSDCFUADDINST_GEN_ALL_CORE_FT
No raising arm for 6 week  Cardiac Device Implant Post Operative Instructions  - Do not touch the incision until it is completely healed.   - There is Dermabond and/or Steristrips (white strips of tape) on your incision, which will start to peel off on their own over the next 2-3 weeks. Do not pick at or peel off.  - Bruising around the implant site or over the chest, side or arm near the incision is normal, and will take a few weeks to resolve.  -Do not lift the affected arm higher than 90 degrees (shoulder height) in any direction for 4 weeks.   - Do not push, pull or lift anything heavier than 10 lbs (about a gallon of milk) with the affected arm for 4 weeks.     - Do not apply soaps, creams, lotions, ointments or powders to the incision until it is completely healed.  - You may take a shower in 24 hours, and allow the water to run over the incision. However, do not submerge the incision in water: do not swim or soak in bath tubs, hot tubs, swimming pools, etc.   You should call the doctor if:   - You notice redness, drainage, swelling, increased tenderness, hot sensation around the incision, bleeding or incision edges pulling apart.  - Your temperature is greater than 100 degrees F for more than 24 hours.  - You notice swelling or bulging at the incision or around the device that was not there when you left the hospital or is increasing in size.  - You experience increased difficulty breathing.  - You notice new/worsening swelling in your legs and ankles.  - You faint or have dizzy spells.  - You have any questions or concerns regarding your device or the procedure.

## 2024-06-03 NOTE — PROGRESS NOTE ADULT - ASSESSMENT
84F with PMHx of hypothyroidism (on no medications), RBBB, anxiety, R hip fracture s/p right hemiarthroplasty 12/31/23 complicated by RLE  DVT (off AC at this time). Admitted to MICU for sinus bradycardia with heart block.     Complete heart block  Sinus bradycardia  Below knee DVT  - c/w TVP placed for recurrent arrhythmias  - B/L LE US noted R gastrocnemius vein DVT (below knee)  - d/c'd Heparin gtt and started Eliquis 5mg po bid per EP recs   - Plan for PPM today by EP  - Atropine 0.5mg ivp PRN HR <30  - c/w hydralazine 50mg po tid and PRN for sBP>150 84F with PMHx of hypothyroidism (on no medications), RBBB, anxiety, R hip fracture s/p right hemiarthroplasty 12/31/23 complicated by RLE  DVT (off AC at this time). Admitted to MICU for sinus bradycardia with heart block.     Complete heart block  Sinus bradycardia  Below knee DVT  - c/w TVP placed for recurrent arrhythmias  - B/L LE US noted R gastrocnemius vein DVT (below knee)  - d/c'd Heparin gtt and started Eliquis 10mg po bid per EP recs   - Plan for PPM today by EP  - Atropine 0.5mg ivp PRN HR <30  - c/w hydralazine 50mg po tid and PRN for sBP>150

## 2024-06-03 NOTE — PROGRESS NOTE ADULT - TIME BILLING
chart review, patient interview, chart documentation.  Plan discussed with patient and daughter Amy Arenas at bedside.

## 2024-06-03 NOTE — DISCHARGE NOTE PROVIDER - NSDCCPTREATMENT_GEN_ALL_CORE_FT
PRINCIPAL PROCEDURE  Procedure: Implantation of dual-chamber heart pacemaker  Findings and Treatment: - Bruising at the groin, sometimes extending down the leg, and/or a small lump under the skin at the groin access site is normal and will resolve within 2 – 3 weeks.   - Occasional skipped beats or palpitations that last for a few beats are common and generally resolve within 1-2 months.   - You may walk and take stairs at a regular pace.   - Do not perform any exercise more strenuous than walking for 1 week.   - Do not strain or lift heavy objects for 1 week.  - You may shower the day after the procedure.  - Do not soak in water (such as tub baths, hot tubs, swimming, etc.) for 1 week.   - You may resume all other activities the day after the procedure.  Call your doctor if:   - you notice bleeding, redness, drainage, swelling, increased tenderness or a hot sensation around the catheter insertion site.   - your temperature is greater than 100 degrees F for more than 24 hours.  - your rapid heart rhythm returns.  - you have any questions or concerns regarding the procedure.  If significant bleeding and/or a large lump (the size of a golf ball or bigger) occurs:  - Lie flat and apply continuous direct pressure just above the puncture site for at least 10 minutes  - If the issue resolves, notify your physician immediately.    - If the bleeding cannot be controlled, please seek immediate medical attention.  If you experience increased difficulty breathing or chest pain, or if you faint or have dizzy spells, please seek immediate medical attention.       PRINCIPAL PROCEDURE  Procedure: Implantation of dual-chamber heart pacemaker  Findings and Treatment: Cardiac Device Implant Post Operative Instructions  - Do not touch the incision until it is completely healed.   - There is Dermabond and/or Steristrips (white strips of tape) on your incision, which will start to peel off on their own over the next 2-3 weeks. Do not pick at or peel off.  - Bruising around the implant site or over the chest, side or arm near the incision is normal, and will take a few weeks to resolve.  -Do not lift the affected arm higher than 90 degrees (shoulder height) in any direction for 4 weeks.   - Do not push, pull or lift anything heavier than 10 lbs (about a gallon of milk) with the affected arm for 4 weeks.     - Do not apply soaps, creams, lotions, ointments or powders to the incision until it is completely healed.  - You may take a shower in 24 hours, and allow the water to run over the incision. However, do not submerge the incision in water: do not swim or soak in bath tubs, hot tubs, swimming pools, etc.   You should call the doctor if:   - You notice redness, drainage, swelling, increased tenderness, hot sensation around the incision, bleeding or incision edges pulling apart.  - Your temperature is greater than 100 degrees F for more than 24 hours.  - You notice swelling or bulging at the incision or around the device that was not there when you left the hospital or is increasing in size.  - You experience increased difficulty breathing.  - You notice new/worsening swelling in your legs and ankles.  - You faint or have dizzy spells.  - You have any questions or concerns regarding your device or the procedure.

## 2024-06-03 NOTE — DIETITIAN INITIAL EVALUATION ADULT - OTHER INFO
84F with PMHx of hypothyroidism (on no medications), RBBB, anxiety, R hip fracture s/p right hemiarthroplasty 12/31/23 complicated by RLE  DVT (off AC at this time) who was sent to [facility] ER by her cardiologist Dr. Delfina Jones for bradycardia. Pt was experiencing episodes of SOB and dizziness on exertion s/p holter which revealed 28 episodes of 2nd degree Mobitz II and 3rd lasting 30mins and brief SVTs. In ED pt would in sinus bradycardia with 2:1 AVB, HR in 40s). Plan for PPM today by EP.

## 2024-06-03 NOTE — DISCHARGE NOTE PROVIDER - NSDCFUSCHEDAPPT_GEN_ALL_CORE_FT
[facility] Physician [facility]  CARDIOLOGY [address]  Scheduled Appointment: 07/09/2024

## 2024-06-03 NOTE — DISCHARGE NOTE PROVIDER - NSDCCPCAREPLAN_GEN_ALL_CORE_FT
PRINCIPAL DISCHARGE DIAGNOSIS  Diagnosis: Complete AV block  Assessment and Plan of Treatment: s/p PPM.   Continue medications as prescribed.  Follow up with Cardio.     PRINCIPAL DISCHARGE DIAGNOSIS  Diagnosis: Complete AV block  Assessment and Plan of Treatment: s/p PPM.   Continue medications as prescribed.  Follow up with Cardio.      SECONDARY DISCHARGE DIAGNOSES  Diagnosis: Right leg DVT  Assessment and Plan of Treatment: Continue medications as prescribed.  Follow up with Vascular Surgery.

## 2024-06-03 NOTE — PROGRESS NOTE ADULT - SUBJECTIVE AND OBJECTIVE BOX
PROCEDURE(S): Permanent Pacemaker Implant    ELECTROPHYSIOLOGIST(S): Lazarus Green MD    COMPLICATIONS:  none          DISPOSITION:  Observation Unit           CONDITION: Stable    Cardiomyopathy:  No    Ejection fraction: 55-60%    Pt doing well s/p TVP removal and MDT dual chamber (LB) PPM implant via left axillary stick. Denies complaint    Device settings: DDD 60-130bpm     Exam:   HR:   BP:   RR:   SpO2:     General: NAD  Incision: Dermabond and steri-strips C/D/I; no bleeding, hematoma, erythema or edema  Card: S1/S2, RRR, no m/g/r  Resp: lungs CTA b/l  Abd: S/NTTP  Ext: no edema    EKG:   CXR:    Assessment:   85yo female with a history HLD, hypothyroidism, RBBB, mild-mod AI, anxiety, R hip surgery 12/31/23 (fractured hip) complicated by RLE right soleal vein DVT, who presented to [facility] after being sent in by Dr Jones (cardiologist) for concern of heart block on Holter monitor. On arrival patient noted to have 2:1 AV block and then resumption of normal conduction. EF preserved on TTE.     Transferred to the ICU after an episode of transient CHB associated with dizziness and pauses up to 5 seconds. Also NSVT noted. TVP was placed via right IJ. Now status post uncomplicated TVP removal and MDT dual chamber (LB) PPM implant via left axillary stick.    Plan:   Port CXR now - r/o PTX or effusion, verify lead locations.   Bedrest x 4 hours post implant, then OOB w/ assist & progress as tolerated.    Continued observation on telemetry overnight.   Cont Ancef 2gm IV q 8 hours x 2 additional doses to complete 24 hour course.   Pain control with PO analgesia PRN.   NO HEPARIN OR LOVENOX, INCLUDING PROPHYLACTIC/SUBCUT DOSING, UNTIL OTHERWISE ADVISED BY EP.   Resume home medications.   PA/Lat CXR and device check in AM.   Pending status overnight, anticipate d/c home tomorrow with outpt f/up with Dr. Green in 1-2 weeks.  No raising arm for 6 week PROCEDURE(S): Permanent Pacemaker Implant    ELECTROPHYSIOLOGIST(S): Lazarus Green MD    COMPLICATIONS:  none          DISPOSITION:  Observation Unit           CONDITION: Stable    Cardiomyopathy:  No    Ejection fraction: 55-60%    Pt doing well s/p TVP removal and MDT dual chamber (LB) PPM implant via left axillary stick. Denies complaint    Device settings: DDD 60-130bpm     Exam:   HR:   BP:   RR:   SpO2:     General: NAD  Incision: Dermabond and steri-strips C/D/I; no bleeding, hematoma, erythema or edema  Card: S1/S2, RRR, no m/g/r  Resp: lungs CTA b/l  Abd: S/NTTP  Ext: no edema    EKG:   CXR:    Assessment:   85yo female with a history HLD, hypothyroidism, RBBB, mild-mod AI, anxiety, R hip surgery 12/31/23 (fractured hip) complicated by RLE right soleal vein DVT, who presented to [facility] after being sent in by Dr Jones (cardiologist) for concern of heart block on Holter monitor. On arrival patient noted to have 2:1 AV block and then resumption of normal conduction. EF preserved on TTE.     Transferred to the ICU after an episode of transient CHB associated with dizziness and pauses up to 5 seconds. Also NSVT noted. TVP was placed via right IJ. Now status post uncomplicated TVP removal and MDT dual chamber (LB) PPM implant via left axillary stick.    Plan:   Port CXR now - r/o PTX or effusion, verify lead locations.   Bedrest x 4 hours post implant, then OOB w/ assist & progress as tolerated.    Continued observation on telemetry overnight.   Cont Ancef 2gm IV q 8 hours x 2 additional doses to complete 24 hour course.   Pain control with PO analgesia PRN.   OK to continue Eliquis for known DVT.  Resume home medications.   PA/Lat CXR and device check in AM.   Pending status overnight, anticipate d/c home tomorrow with outpt f/up with Dr. Green in 1-2 weeks.  No raising arm for 6 week PROCEDURE(S): Permanent Pacemaker Implant    ELECTROPHYSIOLOGIST(S): Lazarus Green MD    COMPLICATIONS:  none          DISPOSITION:  Observation Unit           CONDITION: Stable    Cardiomyopathy:  No    Ejection fraction: 55-60%    Pt doing well s/p TVP removal and MDT dual chamber (LB) PPM implant via left axillary stick. Denies complaint    Device settings: DDD 60-130bpm     Exam:   HR:   BP:   RR:   SpO2:     General: NAD  Incision: Dermabond and steri-strips C/D/I; no bleeding, hematoma, erythema or edema  Card: S1/S2, RRR, no m/g/r  Resp: lungs CTA b/l  Abd: S/NTTP  Ext: no edema    EKG:   CXR:    Assessment:   85yo female with a history HLD, hypothyroidism, RBBB, mild-mod AI, anxiety, R hip surgery 12/31/23 (fractured hip) complicated by RLE right soleal vein DVT, who presented to [facility] after being sent in by Dr Jones (cardiologist) for concern of heart block on Holter monitor. On arrival patient noted to have 2:1 AV block and then resumption of normal conduction. EF preserved on TTE.     Transferred to the ICU after an episode of transient CHB associated with dizziness and pauses up to 5 seconds. Also NSVT noted. TVP was placed via right IJ. Now status post uncomplicated TVP removal and MDT dual chamber (LB) PPM implant via left axillary stick.    Plan:   Port CXR now - r/o PTX or effusion, verify lead locations.   Bedrest x 4 hours post implant, then OOB w/ assist & progress as tolerated.    Continued observation on telemetry overnight.   Cont Ancef 2gm IV q 8 hours x 2 additional doses to complete 24 hour course.   Pain control with PO analgesia PRN.   OK to continue Eliquis for known DVT.  PA/Lat CXR and device check in AM.   Pending status overnight, anticipate d/c home tomorrow with outpt f/up with Dr. Green in 1-2 weeks.  No raising arm for 6 week PROCEDURE(S): Permanent Pacemaker Implant    ELECTROPHYSIOLOGIST(S): Lazarus Green MD    COMPLICATIONS:  none          DISPOSITION:  Observation Unit           CONDITION: Stable    Cardiomyopathy:  No    Ejection fraction: 55-60%    Pt doing well s/p TVP removal and MDT dual chamber (LB) PPM implant via left axillary stick. Denies complaint    Device settings: DDD 60-130bpm     Exam:   HR: 115  BP: 116/74  RR: 14  SpO2: 93% RA    General: NAD  Incision: Dermabond and steri-strips C/D/I; no bleeding, hematoma, erythema or edema  Card: S1/S2, Regular, slightly tachycardic   Resp: lungs CTA b/l  Abd: S/NTTP  Ext: no edema    EKG:   CXR:    Assessment:   83yo female with a history HLD, hypothyroidism, RBBB, mild-mod AI, anxiety, R hip surgery 12/31/23 (fractured hip) complicated by RLE right soleal vein DVT, who presented to [facility] after being sent in by Dr Jones (cardiologist) for concern of heart block on Holter monitor. On arrival patient noted to have 2:1 AV block and then resumption of normal conduction. EF preserved on TTE.     Transferred to the ICU after an episode of transient CHB associated with dizziness and pauses up to 5 seconds. Also NSVT noted. TVP was placed via right IJ. Now status post uncomplicated TVP removal and MDT dual chamber (LB) PPM implant via left axillary stick.    Plan:   Port CXR now - r/o PTX or effusion, verify lead locations.   Bedrest x 4 hours post implant, then OOB w/ assist & progress as tolerated.    Continued observation on telemetry overnight.   Cont Ancef 2gm IV q 8 hours x 2 additional doses to complete 24 hour course.   Pain control with PO analgesia PRN.   OK to continue Eliquis for known DVT.  PA/Lat CXR and device check in AM.   Pending status overnight, anticipate d/c home tomorrow with outpt f/up with Dr. Green in 1-2 weeks.  No raising arm for 6 week PROCEDURE(S): Permanent Pacemaker Implant    ELECTROPHYSIOLOGIST(S): Lazarus Green MD    COMPLICATIONS:  none          DISPOSITION:  Observation Unit           CONDITION: Stable    Cardiomyopathy:  No    Ejection fraction: 55-60%    Pt doing well s/p TVP removal and MDT dual chamber (LB) PPM implant via left axillary stick. Denies complaint    Device settings: DDD 60-130bpm     Exam:   HR: 115  BP: 116/74  RR: 14  SpO2: 93% RA    General: NAD  Incision: Dermabond and steri-strips C/D/I; no bleeding, hematoma, erythema or edema  Card: S1/S2, Regular, slightly tachycardic   Resp: lungs CTA b/l  Abd: S/NTTP  Ext: no edema    EKG: Sinus tach ~120bpm, iRBBB. ND 98ms  CXR: Pending    Assessment:   85yo female with a history HLD, hypothyroidism, RBBB, mild-mod AI, anxiety, R hip surgery 12/31/23 (fractured hip) complicated by RLE right soleal vein DVT, who presented to [facility] after being sent in by Dr Jones (cardiologist) for concern of heart block on Holter monitor. On arrival patient noted to have 2:1 AV block and then resumption of normal conduction. EF preserved on TTE.     Transferred to the ICU after an episode of transient CHB associated with dizziness and pauses up to 5 seconds. Also NSVT noted. TVP was placed via right IJ. Now status post uncomplicated TVP removal and MDT dual chamber (LB) PPM implant via left axillary stick.    Plan:   Port CXR now - r/o PTX or effusion, verify lead locations.   Bedrest x 4 hours post implant, then OOB w/ assist & progress as tolerated.    Continued observation on telemetry overnight.   Cont Ancef 2gm IV q 8 hours x 2 additional doses to complete 24 hour course.   Pain control with PO analgesia PRN.   OK to continue Eliquis for known DVT.  PA/Lat CXR and device check in AM.   Pending status overnight, anticipate d/c home tomorrow with outpt f/up with Dr. Green in 1-2 weeks.  No raising arm for 6 week PROCEDURE(S): Permanent Pacemaker Implant    ELECTROPHYSIOLOGIST(S): Lazarus Green MD    COMPLICATIONS:  none          DISPOSITION:  Observation Unit           CONDITION: Stable    Cardiomyopathy:  No    Ejection fraction: 55-60%    Pt doing well s/p TVP removal and MDT dual chamber (LB) PPM implant via left axillary stick. Denies complaint    Device settings: DDD 60-130bpm     Exam:   HR: 115  BP: 116/74  RR: 14  SpO2: 93% RA    General: NAD  Incision: Dermabond and steri-strips C/D/I; no bleeding, hematoma, erythema or edema  Card: S1/S2, Regular, slightly tachycardic   Resp: lungs CTA b/l  Abd: S/NTTP  Ext: no edema    EKG: AT ~120bpm, iRBBB. CA 98ms  CXR: Pending    Assessment:   83yo female with a history HLD, hypothyroidism, RBBB, mild-mod AI, anxiety, R hip surgery 12/31/23 (fractured hip) complicated by RLE right soleal vein DVT, who presented to [facility] after being sent in by Dr Jones (cardiologist) for concern of heart block on Holter monitor. On arrival patient noted to have 2:1 AV block and then resumption of normal conduction. EF preserved on TTE.     Transferred to the ICU after an episode of transient CHB associated with dizziness and pauses up to 5 seconds. Also NSVT noted. TVP was placed via right IJ. Now status post uncomplicated TVP removal and MDT dual chamber (LB) PPM implant via left axillary stick.    Plan:   Pt currently in an AT at 120bpm. Recommend starting Metoprolol 12.5mg twice daily. Can give Lopressor 5mg IVP now if BP allows.   Port CXR now - r/o PTX or effusion, verify lead locations.   Bedrest x 4 hours post implant, then OOB w/ assist & progress as tolerated.    Continued observation on telemetry overnight.   Cont Ancef 2gm IV q 8 hours x 2 additional doses to complete 24 hour course.   Pain control with PO analgesia PRN.   OK to continue Eliquis for known DVT.  PA/Lat CXR and device check in AM.   Pending status overnight, anticipate d/c home tomorrow with outpt f/up with Dr. Green in 1-2 weeks.  No raising arm for 6 week PROCEDURE(S): Permanent Pacemaker Implant    ELECTROPHYSIOLOGIST(S): Lazarus Green MD    COMPLICATIONS:  none          DISPOSITION:  Observation Unit           CONDITION: Stable    Cardiomyopathy:  No    Ejection fraction: 55-60%    Pt doing well s/p TVP removal and MDT dual chamber (LB) PPM implant via left axillary stick. Denies complaint    Device settings: DDD 60-130bpm     Exam:   HR: 115  BP: 116/74  RR: 14  SpO2: 93% RA    General: NAD  Incision: Dermabond and steri-strips C/D/I; no bleeding, hematoma, erythema or edema  Card: S1/S2, Regular, slightly tachycardic   Resp: lungs CTA b/l  Abd: S/NTTP  Ext: no edema    EKG: AT ~120bpm, iRBBB  CXR: Pending    Assessment:   83yo female with a history HLD, hypothyroidism, RBBB, mild-mod AI, anxiety, R hip surgery 12/31/23 (fractured hip) complicated by RLE right soleal vein DVT, who presented to [facility] after being sent in by Dr Jones (cardiologist) for concern of heart block on Holter monitor. On arrival patient noted to have 2:1 AV block and then resumption of normal conduction. EF preserved on TTE.     Transferred to the ICU after an episode of transient CHB associated with dizziness and pauses up to 5 seconds. Also NSVT noted. TVP was placed via right IJ. Now status post uncomplicated TVP removal and MDT dual chamber (LB) PPM implant via left axillary stick.    Plan:   Pt currently in an AT at 120bpm. Recommend starting Metoprolol 12.5mg twice daily. Can give Lopressor 5mg IVP now if BP allows.   Port CXR now - r/o PTX or effusion, verify lead locations.   Bedrest x 4 hours post implant, then OOB w/ assist & progress as tolerated.    Continued observation on telemetry overnight.   Cont Ancef 2gm IV q 8 hours x 2 additional doses to complete 24 hour course.   Pain control with PO analgesia PRN.   OK to continue Eliquis for known DVT.  PA/Lat CXR and device check in AM.   Pending status overnight, anticipate d/c home tomorrow with outpt f/up with Dr. Green in 1-2 weeks.  No raising arm for 6 week

## 2024-06-03 NOTE — PROGRESS NOTE ADULT - ATTENDING COMMENTS
Events:Patient anxious prior to procedure receiving 5 mg p.o. Valium and 2.5 mg IV Valium in total.  Brief noncapture of TVP and patient went back into complete heart block adjusted TVP in the morning by EP team and taken to Cath Lab for PPM placement without any complication and removal of TVP at the same time        Assessment: 84F with hx of RBBB, hypothyroidism secondary to Hashimoto's thyroiditis, HLD, anxiety, R hip fracture s/p R hemiarthroplasty c/b RLE soleal DVT not on AC referred to the ED 5/31 by her cardiologist who placed her on Holter monitor due to complaint of dyspnea/dizziness for the last month  admitted to medical ICU with  Complete heart block/high  second-degree block with RBBB  Lower extremity DVT   severe anxiety    ====================== NEUROLOGY=====================  acetaminophen     Tablet .. 650 milliGRAM(s) Oral every 6 hours PRN Mild Pain (1 - 3), Moderate Pain (4 - 6), Severe Pain (7 - 10)   as needed Valium earlier in the day  ==================== RESPIRATORY======================   oxygenating ventilating fine on room air    ====================CARDIOVASCULAR==================  hydrALAZINE 50 milliGRAM(s) Oral three times a day  hydrALAZINE Injectable 10 milliGRAM(s) IV Push every 2 hours PRN give for SBP > 150   pacemaker placed as above  Further management as per EP  Switch from heparin to Eliquis  Recommended outpatient hematology evaluation  ===================HEMATOLOGIC/ONC ===================  apixaban 10 milliGRAM(s) Oral every 12 hours    ===================== RENAL =========================  Continue monitoring urine output   no acute issues  Electrolytes  ==================== GASTROINTESTINAL===================   n.p.o. for procedure  =======================    ENDOCRINE  =====================   no active issues  ========================INFECTIOUS DISEASE================  ceFAZolin   IVPB 2000 milliGRAM(s) IV Intermittent every 8 hours: Periprocedural          Care plan discussed with ICU care team. plan discussed with patient and daughter at bedside all questions answered plan discussed with EP

## 2024-06-04 ENCOUNTER — TRANSCRIPTION ENCOUNTER (OUTPATIENT)
Age: 84
End: 2024-06-04

## 2024-06-04 VITALS
HEART RATE: 87 BPM | OXYGEN SATURATION: 97 % | TEMPERATURE: 99 F | SYSTOLIC BLOOD PRESSURE: 114 MMHG | RESPIRATION RATE: 18 BRPM | DIASTOLIC BLOOD PRESSURE: 64 MMHG

## 2024-06-04 LAB
ANION GAP SERPL CALC-SCNC: 14 MMOL/L — SIGNIFICANT CHANGE UP (ref 5–17)
BUN SERPL-MCNC: 17.1 MG/DL — SIGNIFICANT CHANGE UP (ref 8–20)
CALCIUM SERPL-MCNC: 8.6 MG/DL — SIGNIFICANT CHANGE UP (ref 8.4–10.5)
CHLORIDE SERPL-SCNC: 101 MMOL/L — SIGNIFICANT CHANGE UP (ref 96–108)
CO2 SERPL-SCNC: 21 MMOL/L — LOW (ref 22–29)
CREAT SERPL-MCNC: 0.71 MG/DL — SIGNIFICANT CHANGE UP (ref 0.5–1.3)
EGFR: 83 ML/MIN/1.73M2 — SIGNIFICANT CHANGE UP
GLUCOSE SERPL-MCNC: 122 MG/DL — HIGH (ref 70–99)
HCT VFR BLD CALC: 38 % — SIGNIFICANT CHANGE UP (ref 34.5–45)
HGB BLD-MCNC: 12.4 G/DL — SIGNIFICANT CHANGE UP (ref 11.5–15.5)
MAGNESIUM SERPL-MCNC: 2 MG/DL — SIGNIFICANT CHANGE UP (ref 1.6–2.6)
MCHC RBC-ENTMCNC: 27.1 PG — SIGNIFICANT CHANGE UP (ref 27–34)
MCHC RBC-ENTMCNC: 32.6 GM/DL — SIGNIFICANT CHANGE UP (ref 32–36)
MCV RBC AUTO: 83 FL — SIGNIFICANT CHANGE UP (ref 80–100)
PLATELET # BLD AUTO: 169 K/UL — SIGNIFICANT CHANGE UP (ref 150–400)
POTASSIUM SERPL-MCNC: 4.4 MMOL/L — SIGNIFICANT CHANGE UP (ref 3.5–5.3)
POTASSIUM SERPL-SCNC: 4.4 MMOL/L — SIGNIFICANT CHANGE UP (ref 3.5–5.3)
RBC # BLD: 4.58 M/UL — SIGNIFICANT CHANGE UP (ref 3.8–5.2)
RBC # FLD: 14.6 % — HIGH (ref 10.3–14.5)
SODIUM SERPL-SCNC: 136 MMOL/L — SIGNIFICANT CHANGE UP (ref 135–145)
WBC # BLD: 9.07 K/UL — SIGNIFICANT CHANGE UP (ref 3.8–10.5)
WBC # FLD AUTO: 9.07 K/UL — SIGNIFICANT CHANGE UP (ref 3.8–10.5)

## 2024-06-04 PROCEDURE — 93970 EXTREMITY STUDY: CPT

## 2024-06-04 PROCEDURE — C1769: CPT

## 2024-06-04 PROCEDURE — C1894: CPT

## 2024-06-04 PROCEDURE — 87640 STAPH A DNA AMP PROBE: CPT

## 2024-06-04 PROCEDURE — 71046 X-RAY EXAM CHEST 2 VIEWS: CPT | Mod: 26

## 2024-06-04 PROCEDURE — 84443 ASSAY THYROID STIM HORMONE: CPT

## 2024-06-04 PROCEDURE — 71045 X-RAY EXAM CHEST 1 VIEW: CPT

## 2024-06-04 PROCEDURE — 86900 BLOOD TYPING SEROLOGIC ABO: CPT

## 2024-06-04 PROCEDURE — 80053 COMPREHEN METABOLIC PANEL: CPT

## 2024-06-04 PROCEDURE — 84436 ASSAY OF TOTAL THYROXINE: CPT

## 2024-06-04 PROCEDURE — 86666 EHRLICHIA ANTIBODY: CPT

## 2024-06-04 PROCEDURE — 0225U NFCT DS DNA&RNA 21 SARSCOV2: CPT

## 2024-06-04 PROCEDURE — 99285 EMERGENCY DEPT VISIT HI MDM: CPT | Mod: 25

## 2024-06-04 PROCEDURE — 85730 THROMBOPLASTIN TIME PARTIAL: CPT

## 2024-06-04 PROCEDURE — 83880 ASSAY OF NATRIURETIC PEPTIDE: CPT

## 2024-06-04 PROCEDURE — 80048 BASIC METABOLIC PNL TOTAL CA: CPT

## 2024-06-04 PROCEDURE — 33208 INSRT HEART PM ATRIAL & VENT: CPT

## 2024-06-04 PROCEDURE — 86901 BLOOD TYPING SEROLOGIC RH(D): CPT

## 2024-06-04 PROCEDURE — 93010 ELECTROCARDIOGRAM REPORT: CPT

## 2024-06-04 PROCEDURE — 99239 HOSP IP/OBS DSCHRG MGMT >30: CPT

## 2024-06-04 PROCEDURE — C1898: CPT

## 2024-06-04 PROCEDURE — 93306 TTE W/DOPPLER COMPLETE: CPT

## 2024-06-04 PROCEDURE — 86753 PROTOZOA ANTIBODY NOS: CPT

## 2024-06-04 PROCEDURE — 86618 LYME DISEASE ANTIBODY: CPT

## 2024-06-04 PROCEDURE — 84484 ASSAY OF TROPONIN QUANT: CPT

## 2024-06-04 PROCEDURE — 36415 COLL VENOUS BLD VENIPUNCTURE: CPT

## 2024-06-04 PROCEDURE — 87635 SARS-COV-2 COVID-19 AMP PRB: CPT

## 2024-06-04 PROCEDURE — C1887: CPT

## 2024-06-04 PROCEDURE — 93005 ELECTROCARDIOGRAM TRACING: CPT

## 2024-06-04 PROCEDURE — 85027 COMPLETE CBC AUTOMATED: CPT

## 2024-06-04 PROCEDURE — 86850 RBC ANTIBODY SCREEN: CPT

## 2024-06-04 PROCEDURE — 71046 X-RAY EXAM CHEST 2 VIEWS: CPT

## 2024-06-04 PROCEDURE — 84100 ASSAY OF PHOSPHORUS: CPT

## 2024-06-04 PROCEDURE — 85610 PROTHROMBIN TIME: CPT

## 2024-06-04 PROCEDURE — 85025 COMPLETE CBC W/AUTO DIFF WBC: CPT

## 2024-06-04 PROCEDURE — 87641 MR-STAPH DNA AMP PROBE: CPT

## 2024-06-04 PROCEDURE — C1785: CPT

## 2024-06-04 PROCEDURE — 83735 ASSAY OF MAGNESIUM: CPT

## 2024-06-04 RX ORDER — METOPROLOL TARTRATE 50 MG
0.5 TABLET ORAL
Qty: 30 | Refills: 0
Start: 2024-06-04 | End: 2024-07-03

## 2024-06-04 RX ORDER — ACETAMINOPHEN 500 MG
2 TABLET ORAL
Qty: 56 | Refills: 0
Start: 2024-06-04 | End: 2024-06-10

## 2024-06-04 RX ORDER — APIXABAN 2.5 MG/1
1 TABLET, FILM COATED ORAL
Qty: 72 | Refills: 0
Start: 2024-06-04 | End: 2024-07-09

## 2024-06-04 RX ADMIN — Medication 2000 MILLIGRAM(S): at 06:32

## 2024-06-04 RX ADMIN — Medication 12.5 MILLIGRAM(S): at 16:21

## 2024-06-04 RX ADMIN — Medication 650 MILLIGRAM(S): at 06:20

## 2024-06-04 RX ADMIN — Medication 650 MILLIGRAM(S): at 16:21

## 2024-06-04 RX ADMIN — Medication 12.5 MILLIGRAM(S): at 05:21

## 2024-06-04 RX ADMIN — APIXABAN 10 MILLIGRAM(S): 2.5 TABLET, FILM COATED ORAL at 16:20

## 2024-06-04 RX ADMIN — Medication 650 MILLIGRAM(S): at 05:22

## 2024-06-04 RX ADMIN — Medication 650 MILLIGRAM(S): at 17:10

## 2024-06-04 RX ADMIN — APIXABAN 10 MILLIGRAM(S): 2.5 TABLET, FILM COATED ORAL at 05:22

## 2024-06-04 RX ADMIN — CHLORHEXIDINE GLUCONATE 1 APPLICATION(S): 213 SOLUTION TOPICAL at 05:25

## 2024-06-04 NOTE — PROGRESS NOTE ADULT - SUBJECTIVE AND OBJECTIVE BOX
Pt doing well POD #1 s/p uncomplicated TVP removal and MDT dual chamber (LB) PPM implant. Stable overnight w/o event. Denies any complaints at time of assessment this AM.       Exam:   VSS, NAD, A&O x 3  Incision: Dermabond C/D/I; no bleeding, hematoma, erythema, exudate or edema; distal pulses intact  Card: S1/S2, RRR, no m/g/r  Resp: lungs CTA b/l  Abd: S/NT/ND  Ext: no edema, distal pulses intact      Device interrogation: measurements appropriate & stable. Parameters confirmed. No diaphragmatic stim.       Tele: A-sensed, V-paced    CXR PA / LAT: Pending       Labs:                         12.4   9.07  )-----------( 169      ( 04 Jun 2024 03:00 )             38.0     06-04    136  |  101  |  17.1  ----------------------------<  122<H>  4.4   |  21.0<L>  |  0.71    Ca    8.6      04 Jun 2024 03:00  Phos  4.1     06-03  Mg     2.0     06-04    TPro  6.4<L>  /  Alb  3.5  /  TBili  0.4  /  DBili  x   /  AST  12  /  ALT  7   /  AlkPhos  60  06-03        Assessment:   85 yo female with a history HLD, hypothyroidism, RBBB, mild-mod AI, anxiety, R hip surgery 12/31/23 (fractured hip) complicated by RLE right soleal vein DVT, who presented to [facility] after being sent in by Dr Jones (cardiologist) for concern of heart block on Holter monitor. On arrival patient noted to have 2:1 AV block and then resumption of normal conduction. EF preserved on TTE. Transferred to the ICU after an episode of transient CHB associated with dizziness and pauses up to 5 seconds. Also NSVT noted. TVP was placed via right IJ.     Pt is now POD #1 s/p uncomplicated TVP removal and MDT dual chamber (LB) PPM implant. Stable overnight w/o event. Denies any complaints at time of assessment this AM.     Plan:   Pt instructed as to ROM of affected arm - no lifting/pushing/pulling >10 lbs & shoulder ROM limited to 90deg & below x 4 weeks.   Pt instructed as to incision care and f/up - written instructions included in d/c documents.  Outpt f/up scheduled.    CXR PA /  LAT pending  Device interrogation pending   Dispo per primary team.

## 2024-06-04 NOTE — PROGRESS NOTE ADULT - PROVIDER SPECIALTY LIST ADULT
Cardiology
Electrophysiology
Electrophysiology
Hospitalist
Electrophysiology
MICU
Electrophysiology
Hospitalist

## 2024-06-04 NOTE — DISCHARGE NOTE NURSING/CASE MANAGEMENT/SOCIAL WORK - NSDCFUADDAPPT_GEN_ALL_CORE_FT
Dr. Green's office will contact you in 3-5 days to schedule this appointment. Please call [phone] with questions or concerns.

## 2024-06-04 NOTE — DISCHARGE NOTE NURSING/CASE MANAGEMENT/SOCIAL WORK - PATIENT PORTAL LINK FT
You can access the FollowMyHealth Patient Portal offered by  by registering at the following website: [email or web address]. By joining [facility]’s FollowMyHealth portal, you will also be able to view your health information using other applications (apps) compatible with our system.

## 2024-06-04 NOTE — CHART NOTE - NSCHARTNOTEFT_GEN_A_CORE
12:15 AM  - Late entry:  Called by bedside nurse pt had 2 episodes of ventricular standstill of 3 sec and 5 seconds associated with dizziness.  Dr Green was contacted who recommended MICU consult.
85yo F w/ RBBB, mild-mod AI, anxiety, admitted for concern of heart block on Holter monitor.  Called by FAITH Ledesma patient on Tele monitor with episodes of bradycardia in the 40's during sleeping and brief episodes of 2:1 AV block.  Patient awake now denies SOB, chest pain, LOC, dizziness or weakness.  Off AV mary alice blockers.  On Telemetry 2:1 AV block, ECG 5/31/24- SR 49bpm with 2:1 AV block and iRBBB, TADEO 152, QRS 114ms.   BP: 150/80  HR: 41  No hemodynamic compromise at this time  PHYSICAL EXAM:  Constitutional: Comfortable . No acute distress.   HEENT: Atraumatic  CNS: A&Ox3. motor 5/5 b/l and sensory intact, no focal deficits   Respiratory: CTAB, unlabored   Cardiovascular: + bradycardia, S1S2, no murmur  Gastrointestinal: Soft, non-tender   Extremities: 2+ Peripheral Pulses, no edema  Psychiatric: Calm  Skin: Warm and dry, no ulcers on extremities  Tele: 2:1 AV block, bradycardia  - continue to monitor on Tele, bedside pacer/Zoll/Atropine   - keep NPO  - follow AM labs   - will d/w Dr. Green this morning plan for dual chamber PPM with LBBa pacing  - awaiting TTE
Called by RN due to lack of capture from TVP  Lead position post procedure with tip of lead at or slightly past TV annulus.   TVP advanced. Pacing threshold tested at 0.9mA  LRL reduced to VVI 50.   Pacemaker implant today
CXR reviewed- RA and LV lead in good position  Device interrogated- measurements appropriate and stable; parameters confirmed.  Patient is stable for dc home from EP perspective.   Office staff to arrange follow up with Dr. Green in 1 week.
PA NOTE-MEDICINE    Called by RN due to Pt refusing to have CTH done.   Will sign out to AM Team

## 2024-06-05 ENCOUNTER — NON-APPOINTMENT (OUTPATIENT)
Age: 84
End: 2024-06-05

## 2024-06-07 LAB
A PHAGOCYTOPH IGG TITR SER IF: SIGNIFICANT CHANGE UP
B BURGDOR AB SER QL IA: 0.61 IV — SIGNIFICANT CHANGE UP
B MICROTI IGG TITR SER: SIGNIFICANT CHANGE UP
E CHAFFEENSIS IGG TITR SER IF: SIGNIFICANT CHANGE UP

## 2024-06-11 ENCOUNTER — APPOINTMENT (OUTPATIENT)
Dept: FAMILY MEDICINE | Facility: CLINIC | Age: 84
End: 2024-06-11
Payer: MEDICARE

## 2024-06-11 VITALS
BODY MASS INDEX: 23.92 KG/M2 | SYSTOLIC BLOOD PRESSURE: 124 MMHG | RESPIRATION RATE: 14 BRPM | HEIGHT: 63 IN | OXYGEN SATURATION: 98 % | WEIGHT: 135 LBS | DIASTOLIC BLOOD PRESSURE: 60 MMHG | HEART RATE: 83 BPM | TEMPERATURE: 98 F

## 2024-06-11 PROCEDURE — 99495 TRANSJ CARE MGMT MOD F2F 14D: CPT

## 2024-06-11 RX ORDER — ACETAMINOPHEN 500 MG/1
500 TABLET ORAL EVERY 6 HOURS
Refills: 0 | Status: ACTIVE | COMMUNITY
Start: 2024-06-11

## 2024-06-11 RX ORDER — APIXABAN 5 MG/1
5 TABLET, FILM COATED ORAL
Qty: 60 | Refills: 3 | Status: ACTIVE | COMMUNITY
Start: 2024-06-11

## 2024-06-11 NOTE — ASSESSMENT
[FreeTextEntry1] : #Hospital Discharge Follow up - Presenting to follow up from recent admission to [facility] 5/31-6/4/24 after outpatient Holter monitoring revealed complete heart block - Patient was admitted to medical floor then transferred to MICU for TVP and eventually underwent PPM placement - Feeling tired since discharge - Notes discomfort in chest wall from PPM placement, taking Tylenol with good relief in pain - Did have elevated HRs after PPM placement and started on Lopressor 12.5mg BID, notes some dizziness when taking medication. HR normal today in 80s. Will follow up with cardiology regarding continuing medication - Will arrange hematology consultation for new RLE DVT found during recent admission. Had RLE DVT in 11/2023 following hip replacement. Will follow up regarding length of AC - Continue Eliquis starter pack, will send refill of Eliquis 5mg BID to pharmacy

## 2024-06-11 NOTE — HISTORY OF PRESENT ILLNESS
[Post-hospitalization from ___ Hospital] : Post-hospitalization from [unfilled] Hospital [Admitted on: ___] : The patient was admitted on [unfilled] [Discharged on ___] : discharged on [unfilled] [Discharge Med List] : discharge medication list [Med Reconciliation] : medication reconciliation has been completed [Patient Contacted By: ____] : and contacted by [unfilled] [Radiology Findings] : radiology findings [FreeTextEntry2] : 85yo female with PMH of HLD, Hashimoto's thyroiditis, RBBB, anxiety, GERD, history of provoked DVT following hip surgery who presents to the office for hospital follow up.  Patient was sent to [facility] by cardiology Dr. Jones after being found to have multiple instances of complete heart block on outpatient Holter monitor. Patient had initially presented to cardiologist complaining of dizziness and shortness of breath at night.   On arrival to [facility], patient was found to be in 2:1 AV block, with HTN urgency /120. Patient's telemetry with sinus pauses up to 5 seconds with dizziness and transferred to MICU. She underwent TVP while in MICU and then underwent PPM on 6/3/24. Her device was interrogated prior to discharge. During hospitalization, patient was found to have a right lower extremity DVT and was started on Eliquis.   Reports discomfort in the area of the PPM. She takes Tylenol which helps greatly with the pain. Has been taking Lopressor 12.5mg BID given elevated HR when pacemaker was placed. Reports she feels dizzy when taking these medications.   Patient will be following with cardiology Dr. Jones tomorrow.  Patient will follow up with EP Dr. Green next week. Has Vascular Surgery consult next week as well for RLE DVT. Would like to establish with hematology to discuss length of AC required given second DVT in one year.

## 2024-06-11 NOTE — PHYSICAL EXAM
[No Acute Distress] : no acute distress [Well-Appearing] : well-appearing [No Respiratory Distress] : no respiratory distress  [Clear to Auscultation] : lungs were clear to auscultation bilaterally [Normal Rate] : normal rate  [Regular Rhythm] : with a regular rhythm [Normal S1, S2] : normal S1 and S2 [06683 - Moderate Complexity requires multiple possible diagnoses and/or the management options, moderate complexity of the medical data (tests, etc.) to be reviewed, and moderate risk of significant complications, morbidity, and/or mortality as well as co] : Moderate Complexity

## 2024-06-12 ENCOUNTER — NON-APPOINTMENT (OUTPATIENT)
Age: 84
End: 2024-06-12

## 2024-06-12 ENCOUNTER — APPOINTMENT (OUTPATIENT)
Dept: CARDIOLOGY | Facility: CLINIC | Age: 84
End: 2024-06-12
Payer: MEDICARE

## 2024-06-12 VITALS
SYSTOLIC BLOOD PRESSURE: 126 MMHG | OXYGEN SATURATION: 96 % | WEIGHT: 135 LBS | BODY MASS INDEX: 23.92 KG/M2 | HEIGHT: 63 IN | DIASTOLIC BLOOD PRESSURE: 66 MMHG | HEART RATE: 80 BPM

## 2024-06-12 DIAGNOSIS — E78.5 HYPERLIPIDEMIA, UNSPECIFIED: ICD-10-CM

## 2024-06-12 PROCEDURE — 99214 OFFICE O/P EST MOD 30 MIN: CPT | Mod: 25

## 2024-06-12 PROCEDURE — 93000 ELECTROCARDIOGRAM COMPLETE: CPT

## 2024-06-12 RX ORDER — APIXABAN 5 MG/1
5 TABLET, FILM COATED ORAL
Qty: 180 | Refills: 0 | Status: DISCONTINUED | COMMUNITY
Start: 2024-06-11 | End: 2024-06-12

## 2024-06-12 NOTE — PHYSICAL EXAM
[Well Developed] : well developed [Well Nourished] : well nourished [No Acute Distress] : no acute distress [Normal Conjunctiva] : normal conjunctiva [Normal Venous Pressure] : normal venous pressure [No Carotid Bruit] : no carotid bruit [Normal S1, S2] : normal S1, S2 [No Rub] : no rub [No Gallop] : no gallop [Clear Lung Fields] : clear lung fields [Good Air Entry] : good air entry [No Respiratory Distress] : no respiratory distress  [Soft] : abdomen soft [Non Tender] : non-tender [No Masses/organomegaly] : no masses/organomegaly [Normal Bowel Sounds] : normal bowel sounds [No Edema] : no edema [No Cyanosis] : no cyanosis [No Clubbing] : no clubbing [No Varicosities] : no varicosities [No Rash] : no rash [No Skin Lesions] : no skin lesions [Moves all extremities] : moves all extremities [No Focal Deficits] : no focal deficits [Normal Speech] : normal speech [Alert and Oriented] : alert and oriented [Normal memory] : normal memory [de-identified] : +sys murmur [de-identified] : ambulates with walker

## 2024-06-12 NOTE — REVIEW OF SYSTEMS
[Joint Pain] : joint pain [Negative] : Heme/Lymph [SOB] : no shortness of breath [Dyspnea on exertion] : not dyspnea during exertion [Chest Discomfort] : no chest discomfort [Lower Ext Edema] : no extremity edema [Leg Claudication] : no intermittent leg claudication [Palpitations] : no palpitations [Orthopnea] : no orthopnea [PND] : no PND [Syncope] : no syncope

## 2024-06-12 NOTE — HISTORY OF PRESENT ILLNESS
[FreeTextEntry1] : Pt is an 83 y/o F PMH HLD, RBBB, mild AS, mild-mod AI, anxiety, DVT on Eliquis .   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  She notes some chest soreness which is relieved with tylenol  She has f/u with EP, Dr Green next week  Home 's/80's  TTE 01/2021 EF 60-65%, mild MR, mild-mod AI TTE 01/2017 EF 70%, mild DD, mild-mod MR, mild TR TTE 05/2024 EF 55-60%, mild AS, mild-mod AI, mild MR stress echo 01/2017 borderline study, possible small region of apical anteroseptal insufficiency montano 05/2021 NSR SVT episodes - longest 12min CUS 04/2021 mild plaque  PMH: HLD, hypothyroidism, GERD PCP Dr Inocente Sanchez Smoking status: never no ETOH no drug use Current exercise: none Daily water intake: 6 glasses Daily caffeine intake: none Family hx: father CVA 73, sister colon cancer Previous hospitalizations: 01/2021 allergic reaction, phlebitis 4 children - no problem with pregnancies

## 2024-06-12 NOTE — DISCUSSION/SUMMARY
[EKG obtained to assist in diagnosis and management of assessed problem(s)] : EKG obtained to assist in diagnosis and management of assessed problem(s) [FreeTextEntry1] : Pt is an 85 y/o F PMH CHB s/p PPM 06/2024, HLD, RBBB, mild AS, mild-mod AI, anxiety, DVT on Eliquis .    TTE 01/2021 EF 60-65%, mild MR, mild-mod AI TTE 01/2017 EF 70%, mild DD, mild-mod MR, mild TR TTE 05/2024 EF 55-60%, mild AS, mild-mod AI, mild MR stress echo 01/2017 borderline study, possible small region of apical anteroseptal insufficiency montano 05/2021 NSR SVT episodes - longest 12min CUS 04/2021 mild plaque  CHB s/p PPM: Dr Green 06/03/2024 doing well  she has f/u next week  RBBB: unchanged  AS: mild will monitor closely  HLD: Advised lifestyle modifications   Pt will return in 6 mnths or sooner as needed but I encouraged communication via phone or portal if necessary.  We will call pt with test results when applicable and arrange for expedited follow up if necessary.  The described plan was discussed with the pt.  All questions and concerns were addressed to the best of my knowledge.

## 2024-06-18 ENCOUNTER — APPOINTMENT (OUTPATIENT)
Dept: VASCULAR SURGERY | Facility: CLINIC | Age: 84
End: 2024-06-18
Payer: MEDICARE

## 2024-06-18 ENCOUNTER — APPOINTMENT (OUTPATIENT)
Dept: CARDIOLOGY | Facility: CLINIC | Age: 84
End: 2024-06-18
Payer: MEDICARE

## 2024-06-18 ENCOUNTER — NON-APPOINTMENT (OUTPATIENT)
Age: 84
End: 2024-06-18

## 2024-06-18 VITALS
BODY MASS INDEX: 25.52 KG/M2 | HEART RATE: 70 BPM | OXYGEN SATURATION: 99 % | DIASTOLIC BLOOD PRESSURE: 74 MMHG | HEIGHT: 63 IN | WEIGHT: 144 LBS | SYSTOLIC BLOOD PRESSURE: 153 MMHG

## 2024-06-18 VITALS
TEMPERATURE: 97.5 F | SYSTOLIC BLOOD PRESSURE: 138 MMHG | OXYGEN SATURATION: 96 % | DIASTOLIC BLOOD PRESSURE: 68 MMHG | RESPIRATION RATE: 16 BRPM | HEART RATE: 79 BPM

## 2024-06-18 DIAGNOSIS — I82.409 ACUTE EMBOLISM AND THROMBOSIS OF UNSPECIFIED DEEP VEINS OF UNSPECIFIED LOWER EXTREMITY: ICD-10-CM

## 2024-06-18 PROCEDURE — 93971 EXTREMITY STUDY: CPT

## 2024-06-18 PROCEDURE — 99215 OFFICE O/P EST HI 40 MIN: CPT

## 2024-06-18 PROCEDURE — 93000 ELECTROCARDIOGRAM COMPLETE: CPT | Mod: 59

## 2024-06-18 PROCEDURE — 93280 PM DEVICE PROGR EVAL DUAL: CPT

## 2024-06-18 PROCEDURE — 99213 OFFICE O/P EST LOW 20 MIN: CPT

## 2024-06-18 NOTE — REVIEW OF SYSTEMS
[Feeling Fatigued] : feeling fatigued [SOB] : shortness of breath [Chest Discomfort] : no chest discomfort [Lower Ext Edema] : no extremity edema [Palpitations] : no palpitations [Orthopnea] : no orthopnea [PND] : no PND [Syncope] : no syncope [Dizziness] : no dizziness [Easy Bleeding] : no tendency for easy bleeding

## 2024-06-18 NOTE — PHYSICAL EXAM
[No Acute Distress] : no acute distress [Normal Venous Pressure] : normal venous pressure [Normal S1, S2] : normal S1, S2 [Clear Lung Fields] : clear lung fields [No Respiratory Distress] : no respiratory distress  [Edema ___] : edema [unfilled] [Moves all extremities] : moves all extremities [Alert and Oriented] : alert and oriented

## 2024-06-18 NOTE — PHYSICAL EXAM
[Normal Rate and Rhythm] : normal rate and rhythm [No Rash or Lesion] : No rash or lesion [Alert] : alert [Oriented to Person] : oriented to person [Oriented to Place] : oriented to place [Oriented to Time] : oriented to time [Calm] : calm [Skin Ulcer] : no ulcer [de-identified] : Appears well, in no acute distress. [de-identified] : normocephalic, atraumatic [de-identified] :  supple, no masses. [de-identified] :   normal respiratory effort [de-identified] : Moves all extremities

## 2024-06-18 NOTE — DISCUSSION/SUMMARY
[EKG obtained to assist in diagnosis and management of assessed problem(s)] : EKG obtained to assist in diagnosis and management of assessed problem(s) [FreeTextEntry1] : 84 year old female with a history HLD, hypothyroidism, RBBB, mild-mod AI, anxiety, R hip surgery 12/31/23 (fractured hip) complicated by RLE DVT, who presented to [facility] after being sent in by Dr Jones (cardiologist) for concern of heart block on Holter monitor. Found to be in 2:1 AV block and then resumption of normal conduction. EF preserved on TTE, transferred to the ICU after an episode of transient CHB associated with dizziness and pauses up to 5 seconds and TVP was placed, subsequently underwent successful dual chamber pacemaker implant (left bundle branch area) via axillary vein access by Dr Green on 6/3/24. Presents today for follow-up and reports overall doing well, she reports occasional episodes of SOB that she relates to anxiety. PPM site LACW healing well, c/o tenderness at site and some muscular shoulder stiffness, device interrogation with no arrythmia events, well-functioning.  Ms. Pugh expresses a desire to come off medications. She asked about discontinuation of the metoprolol. From an EP perspective, she could be off BB therapy however, she may need an alternative for her BP management, and she will further discuss this with cardiologist Dr Jones.   Recommendations: -Continue remote monitoring via PPM device -Continue metoprolol tartrate 25mg po q12hr -Continue Eliquis for recent DVT, follows with Dr Harris -Continue to follow with cardiologist Dr Jones -Follow-up with Dr Green in 3-months.

## 2024-06-18 NOTE — HISTORY OF PRESENT ILLNESS
[FreeTextEntry1] : 84 year old female with a history HLD, hypothyroidism, RBBB, mild-mod AI, anxiety, R hip surgery 12/31/23 (fractured hip) complicated by RLE DVT, who presented to [facility] after being sent in by Dr Jones (cardiologist) for concern of heart block on Holter monitor. On arrival patient noted to have 2:1 AV block and then resumption of normal conduction. EF preserved on TTE. Transferred to the ICU after an episode of transient CHB associated with dizziness and pauses up to 5 seconds. TVP was placed via right IJ. On 6/3/24 underwent successful dual chamber pacemaker implant (left bundle branch area) via axillary vein access by Dr Green. Presents today for follow-up.  Reports overall doing well, denies palpitaions, dizziness or chest pain, she reports occasional episodes of SOB that she related to anxiety. PPM site LACW with wound edges well-approximated, no redness/swelling/drainage, FLO neurovascular intact. She c/o tenderness at site and some muscular shoulder stiffness.   MDT DC PPM Device interrogation with well-functioning device, no arrhythmia events, AP 1.5%,  57.9%, leads/impedance stable  Currently taking metoprolol 12.5mg po q12hr and Eliquis 5mg po q12hr

## 2024-06-18 NOTE — ASSESSMENT
[FreeTextEntry1] : 84-year-old female with chronic non-occlusive venous changes/scarring and previous duplex ultrasounds in the office.  Of the right gastrocnemius vein as demonstrated by venous duplex in office today. Advised patient of these findings which indicate chronic changes that were noted she has no evidence of acute DVT.  She should discontinue Eliquis as this is not a recurrent clot but scar tissue from previous clot. Her previous DVT was provoked after hip surgery. No need to follow up with hematology.  He is  Prior to appointment and during encounter with patient extensive medical records were reviewed including but not limited to, Hospital records, out patient records, laboratory data and microbiology data In addition extensive time was also spent in reviewing diagnostic studies.  Total encounter total time 25 mins >50% of time spent counseling/coordinating care

## 2024-06-18 NOTE — CARDIOLOGY SUMMARY
[de-identified] : 6/18/24: Paced 67bpm [de-identified] : 5/31/24 TTE:  1. Left ventricular cavity is normal in size. Left ventricular wall thickness is normal. Left ventricular systolic function is normal with an ejection fraction visually estimated at 55 to 60 %.  2. There is mild (grade 1) left ventricular diastolic dysfunction, with normal filling pressure.  3. Normal right ventricular cavity size and normal systolic function.  4. The left atrium is normal in size.  5. The right atrium is normal in size.  6. There is mild calcification of the mitral valve annulus.  7. Thickened mitral valve leaflets. Moderate mitral valve leaflet calcification.  8. Mild mitral regurgitation.  9. Trileaflet aortic valve. Mild aortic stenosis. 10. Mild to moderate aortic regurgitation. 11. Trace tricuspid regurgitation. Pulmonary artery systolic pressure could not be estimated. 12. No pericardial effusion seen.

## 2024-06-18 NOTE — HISTORY OF PRESENT ILLNESS
[FreeTextEntry1] : 84-year-old female with recent hospital admission at [facility] after being sent in by Dr Jones (cardiologist) for concern of heart block on Holter monitor. Patient s/p PPM and was noted during her hospital stay to have RLE DVT of the gastrocnemius vein. Patient was previously seen in clinic in December 2023 with subacute DVT of the right gastrocnemius vein after hip surgery. Patient reports she had no LE symptoms while hospitalized recently it is unclear why a duplex was done of the RLE. Denies LE pain, swelling, chest pain, shortness of breath.
No

## 2024-06-20 RX ORDER — METOPROLOL TARTRATE 25 MG/1
25 TABLET, FILM COATED ORAL TWICE DAILY
Qty: 180 | Refills: 1 | Status: ACTIVE | COMMUNITY
Start: 2024-06-11 | End: 1900-01-01

## 2024-07-01 ENCOUNTER — APPOINTMENT (OUTPATIENT)
Dept: FAMILY MEDICINE | Facility: CLINIC | Age: 84
End: 2024-07-01
Payer: MEDICARE

## 2024-07-01 VITALS
BODY MASS INDEX: 24.8 KG/M2 | HEIGHT: 63 IN | OXYGEN SATURATION: 97 % | HEART RATE: 72 BPM | TEMPERATURE: 97.3 F | WEIGHT: 140 LBS | SYSTOLIC BLOOD PRESSURE: 126 MMHG | DIASTOLIC BLOOD PRESSURE: 60 MMHG

## 2024-07-01 DIAGNOSIS — I44.2 ATRIOVENTRICULAR BLOCK, COMPLETE: ICD-10-CM

## 2024-07-01 DIAGNOSIS — R52 PAIN, UNSPECIFIED: ICD-10-CM

## 2024-07-01 PROCEDURE — G2211 COMPLEX E/M VISIT ADD ON: CPT

## 2024-07-01 PROCEDURE — 99213 OFFICE O/P EST LOW 20 MIN: CPT

## 2024-07-02 ENCOUNTER — APPOINTMENT (OUTPATIENT)
Dept: ORTHOPEDIC SURGERY | Facility: CLINIC | Age: 84
End: 2024-07-02
Payer: MEDICARE

## 2024-07-02 VITALS
HEIGHT: 63 IN | WEIGHT: 140 LBS | SYSTOLIC BLOOD PRESSURE: 160 MMHG | BODY MASS INDEX: 24.8 KG/M2 | DIASTOLIC BLOOD PRESSURE: 72 MMHG | HEART RATE: 71 BPM

## 2024-07-02 DIAGNOSIS — M17.0 BILATERAL PRIMARY OSTEOARTHRITIS OF KNEE: ICD-10-CM

## 2024-07-02 PROCEDURE — 99213 OFFICE O/P EST LOW 20 MIN: CPT | Mod: 25

## 2024-07-02 PROCEDURE — 20610 DRAIN/INJ JOINT/BURSA W/O US: CPT | Mod: RT

## 2024-07-02 PROCEDURE — G2211 COMPLEX E/M VISIT ADD ON: CPT

## 2024-07-07 ENCOUNTER — NON-APPOINTMENT (OUTPATIENT)
Age: 84
End: 2024-07-07

## 2024-07-09 ENCOUNTER — APPOINTMENT (OUTPATIENT)
Dept: CARDIOLOGY | Facility: CLINIC | Age: 84
End: 2024-07-09

## 2024-07-10 ENCOUNTER — NON-APPOINTMENT (OUTPATIENT)
Age: 84
End: 2024-07-10

## 2024-07-22 ENCOUNTER — NON-APPOINTMENT (OUTPATIENT)
Age: 84
End: 2024-07-22

## 2024-07-22 ENCOUNTER — APPOINTMENT (OUTPATIENT)
Dept: ELECTROPHYSIOLOGY | Facility: CLINIC | Age: 84
End: 2024-07-22
Payer: MEDICARE

## 2024-07-22 VITALS
DIASTOLIC BLOOD PRESSURE: 64 MMHG | SYSTOLIC BLOOD PRESSURE: 134 MMHG | HEIGHT: 63 IN | BODY MASS INDEX: 26.22 KG/M2 | OXYGEN SATURATION: 96 % | HEART RATE: 77 BPM | WEIGHT: 148 LBS

## 2024-07-22 PROCEDURE — 93000 ELECTROCARDIOGRAM COMPLETE: CPT

## 2024-07-22 PROCEDURE — 99215 OFFICE O/P EST HI 40 MIN: CPT

## 2024-07-26 ENCOUNTER — LABORATORY RESULT (OUTPATIENT)
Age: 84
End: 2024-07-26

## 2024-07-26 ENCOUNTER — NON-APPOINTMENT (OUTPATIENT)
Age: 84
End: 2024-07-26

## 2024-07-26 ENCOUNTER — APPOINTMENT (OUTPATIENT)
Dept: FAMILY MEDICINE | Facility: CLINIC | Age: 84
End: 2024-07-26
Payer: MEDICARE

## 2024-07-26 VITALS
DIASTOLIC BLOOD PRESSURE: 68 MMHG | HEIGHT: 63 IN | BODY MASS INDEX: 24.63 KG/M2 | HEART RATE: 80 BPM | TEMPERATURE: 97.3 F | SYSTOLIC BLOOD PRESSURE: 130 MMHG | WEIGHT: 139 LBS | OXYGEN SATURATION: 97 %

## 2024-07-26 DIAGNOSIS — R35.0 FREQUENCY OF MICTURITION: ICD-10-CM

## 2024-07-26 DIAGNOSIS — M25.50 PAIN IN UNSPECIFIED JOINT: ICD-10-CM

## 2024-07-26 DIAGNOSIS — R42 DIZZINESS AND GIDDINESS: ICD-10-CM

## 2024-07-26 PROCEDURE — 99214 OFFICE O/P EST MOD 30 MIN: CPT

## 2024-07-26 PROCEDURE — G2211 COMPLEX E/M VISIT ADD ON: CPT

## 2024-07-26 NOTE — HISTORY OF PRESENT ILLNESS
[FreeTextEntry8] : 83yo female with PMH of CHB s/p PPM who presents to the office complaining of dizziness.   Reports that her blood pressure has been up and down. She feels like the metoprolol is not helping. She has been taking her blood pressure at home, reports SBP ranges from 130-150s.   Concerned about leg swelling in the right lower extremity. History of right lower extremity DVT, eliquis stopped about one month ago.   Reports increased urinary frequency at night. No burning with urination.   Reports dizziness comes and goes. She feels unsteady on her feet, not like room is spinning.

## 2024-07-26 NOTE — REVIEW OF SYSTEMS
[Lower Ext Edema] : lower extremity edema [Shortness Of Breath] : shortness of breath [Headache] : headache [Dizziness] : dizziness [Fever] : no fever [Chills] : no chills [Chest Pain] : no chest pain [Palpitations] : no palpitations [Dysuria] : no dysuria [Nocturia] : nocturia [Frequency] : frequency

## 2024-07-26 NOTE — ASSESSMENT
[FreeTextEntry1] : #Dizziness/arthralgias - Presenting to the office complaining of dizziness and feeling off for the past few weeks - She believes this is secondary to her Lopressor, reports symptoms have started when she started taking Lopressor and PPM was placed  - Etiology of dizziness is unclear, she does note a history of middle ear imbalance but this does not feel similar to those instances - VSS today, no bradycardia or hypotension - Will discuss with cardiology regarding Lopressor - Check labs to rule out anemia, vitamin deficiency or metabolic derangements leading to symptoms   #Urinary frequency - Complaining of increased urinary frequency and nocturia  - Check UA with reflex to urine culture  - Could be from vaginal dryness if urine is clear

## 2024-07-26 NOTE — PHYSICAL EXAM
[No Acute Distress] : no acute distress [Well-Appearing] : well-appearing [Normal Outer Ear/Nose] : the outer ears and nose were normal in appearance [Normal Oropharynx] : the oropharynx was normal [No Respiratory Distress] : no respiratory distress  [Clear to Auscultation] : lungs were clear to auscultation bilaterally [Normal Rate] : normal rate  [Regular Rhythm] : with a regular rhythm [Normal S1, S2] : normal S1 and S2 [Normal Affect] : the affect was normal [Normal Insight/Judgement] : insight and judgment were intact

## 2024-07-30 DIAGNOSIS — E03.9 HYPOTHYROIDISM, UNSPECIFIED: ICD-10-CM

## 2024-07-30 LAB
25(OH)D3 SERPL-MCNC: 13.8 NG/ML
ALBUMIN SERPL ELPH-MCNC: 4.5 G/DL
ALP BLD-CCNC: 71 U/L
ALT SERPL-CCNC: 6 U/L
ANION GAP SERPL CALC-SCNC: 11 MMOL/L
APPEARANCE: CLEAR
AST SERPL-CCNC: 15 U/L
BASOPHILS # BLD AUTO: 0.04 K/UL
BASOPHILS NFR BLD AUTO: 0.6 %
BILIRUB SERPL-MCNC: 0.4 MG/DL
BILIRUBIN URINE: NEGATIVE
BLOOD URINE: ABNORMAL
BUN SERPL-MCNC: 17 MG/DL
CALCIUM SERPL-MCNC: 9.7 MG/DL
CHLORIDE SERPL-SCNC: 101 MMOL/L
CO2 SERPL-SCNC: 27 MMOL/L
COLOR: YELLOW
CREAT SERPL-MCNC: 0.81 MG/DL
CRP SERPL-MCNC: <3 MG/L
DEPRECATED D DIMER PPP IA-ACNC: 363 NG/ML DDU
EGFR: 71 ML/MIN/1.73M2
EOSINOPHIL # BLD AUTO: 0.23 K/UL
EOSINOPHIL NFR BLD AUTO: 3.5 %
ERYTHROCYTE [SEDIMENTATION RATE] IN BLOOD BY WESTERGREN METHOD: 39 MM/HR
FOLATE SERPL-MCNC: 10.7 NG/ML
GLUCOSE QUALITATIVE U: NEGATIVE MG/DL
GLUCOSE SERPL-MCNC: 99 MG/DL
HCT VFR BLD CALC: 43.4 %
HGB BLD-MCNC: 13.4 G/DL
IMM GRANULOCYTES NFR BLD AUTO: 0.2 %
IRON SATN MFR SERPL: 27 %
IRON SERPL-MCNC: 75 UG/DL
KETONES URINE: NEGATIVE MG/DL
LEUKOCYTE ESTERASE URINE: ABNORMAL
LYMPHOCYTES # BLD AUTO: 1.52 K/UL
LYMPHOCYTES NFR BLD AUTO: 23.3 %
MAGNESIUM SERPL-MCNC: 2.2 MG/DL
MAN DIFF?: NORMAL
MCHC RBC-ENTMCNC: 26.4 PG
MCHC RBC-ENTMCNC: 30.9 GM/DL
MCV RBC AUTO: 85.4 FL
MONOCYTES # BLD AUTO: 0.67 K/UL
MONOCYTES NFR BLD AUTO: 10.3 %
NEUTROPHILS # BLD AUTO: 4.06 K/UL
NEUTROPHILS NFR BLD AUTO: 62.1 %
NITRITE URINE: NEGATIVE
PH URINE: 6
PHOSPHATE SERPL-MCNC: 4.1 MG/DL
PLATELET # BLD AUTO: 217 K/UL
POTASSIUM SERPL-SCNC: 5.1 MMOL/L
PROT SERPL-MCNC: 7.3 G/DL
PROTEIN URINE: NEGATIVE MG/DL
RBC # BLD: 5.08 M/UL
RBC # FLD: 14.1 %
SODIUM SERPL-SCNC: 139 MMOL/L
SPECIFIC GRAVITY URINE: 1.02
TIBC SERPL-MCNC: 281 UG/DL
TSH SERPL-ACNC: 8.07 UIU/ML
UIBC SERPL-MCNC: 206 UG/DL
UROBILINOGEN URINE: 0.2 MG/DL
VIT B12 SERPL-MCNC: 312 PG/ML
WBC # FLD AUTO: 6.53 K/UL

## 2024-07-31 ENCOUNTER — NON-APPOINTMENT (OUTPATIENT)
Age: 84
End: 2024-07-31

## 2024-07-31 ENCOUNTER — APPOINTMENT (OUTPATIENT)
Dept: CARDIOLOGY | Facility: CLINIC | Age: 84
End: 2024-07-31
Payer: MEDICARE

## 2024-07-31 VITALS
SYSTOLIC BLOOD PRESSURE: 146 MMHG | HEIGHT: 63 IN | OXYGEN SATURATION: 97 % | BODY MASS INDEX: 26.05 KG/M2 | WEIGHT: 147 LBS | DIASTOLIC BLOOD PRESSURE: 66 MMHG | HEART RATE: 72 BPM

## 2024-07-31 DIAGNOSIS — E78.5 HYPERLIPIDEMIA, UNSPECIFIED: ICD-10-CM

## 2024-07-31 DIAGNOSIS — I44.2 ATRIOVENTRICULAR BLOCK, COMPLETE: ICD-10-CM

## 2024-07-31 DIAGNOSIS — I45.10 UNSPECIFIED RIGHT BUNDLE-BRANCH BLOCK: ICD-10-CM

## 2024-07-31 DIAGNOSIS — Z95.0 PRESENCE OF CARDIAC PACEMAKER: ICD-10-CM

## 2024-07-31 DIAGNOSIS — I10 ESSENTIAL (PRIMARY) HYPERTENSION: ICD-10-CM

## 2024-07-31 LAB
ANA PAT FLD IF-IMP: ABNORMAL
ANA SER IF-ACNC: ABNORMAL

## 2024-07-31 PROCEDURE — 99214 OFFICE O/P EST MOD 30 MIN: CPT | Mod: 25

## 2024-07-31 PROCEDURE — 93000 ELECTROCARDIOGRAM COMPLETE: CPT

## 2024-07-31 NOTE — DISCUSSION/SUMMARY
[EKG obtained to assist in diagnosis and management of assessed problem(s)] : EKG obtained to assist in diagnosis and management of assessed problem(s) [FreeTextEntry1] : 84 year old female with a history HLD, hypothyroidism, RBBB, mild-mod AI, anxiety, R hip surgery 12/31/23 (fractured hip) complicated by RLE DVT, who presented to [facility] after being sent in by Dr Jones (cardiologist) for concern of heart block on Holter monitor. Found to be in 2:1 AV block and then resumption of normal conduction. EF preserved on TTE, transferred to the ICU after an episode of transient CHB associated with dizziness and pauses up to 5 seconds and TVP was placed, subsequently underwent successful dual chamber pacemaker implant (left bundle branch area) via axillary vein access by Dr Green on 6/3/24. Presents today for follow-up and reports overall doing well, she reports occasional episodes of SOB that she relates to anxiety. PPM site LACW healing well, expresses the location is somewhat bothersome, device interrogation with no significant arrythmia events and well-functioning.  Ms. Pugh expresses a desire to come off medications. She asked about discontinuation of the metoprolol. From an EP perspective, she could be off BB therapy however, she may need an alternative for her BP management, and she will further discuss this with cardiologist Dr Jones.   Recommendations: -Continue remote monitoring via PPM device -Continue metoprolol tartrate 12.5mg po q12hr -Continue to follow with cardiologist Dr Jones -Follow-up with Dr Green 9/25/24, discuss concerns regarding PPM location and possible revision

## 2024-07-31 NOTE — DISCUSSION/SUMMARY
[EKG obtained to assist in diagnosis and management of assessed problem(s)] : EKG obtained to assist in diagnosis and management of assessed problem(s) [FreeTextEntry1] : 84 year old female with a history HLD, hypothyroidism, RBBB, mild-mod AI, anxiety, R hip surgery 12/31/23 (fractured hip) complicated by RLE DVT, who presented to [facility] after being sent in by Dr Jones (cardiologist) for concern of heart block on Holter monitor. Found to be in 2:1 AV block and then resumption of normal conduction. EF preserved on TTE, transferred to the ICU after an episode of transient CHB associated with dizziness and pauses up to 5 seconds and TVP was placed, subsequently underwent successful dual chamber pacemaker implant (left bundle branch area) via axillary vein access by Dr Green on 6/3/24. Presents today for follow-up and reports overall doing well, she reports occasional episodes of SOB that she relates to anxiety. PPM site LACW healing well, expresses the location is somewhat bothersome, device interrogation with no significant arrythmia events and well-functioning.  Ms. Pugh expresses a desire to come off medications. She asked about discontinuation of the metoprolol. From an EP perspective, she could be off BB therapy however, she may need an alternative for her BP management, and she will further discuss this with cardiologist Dr Jones.   Recommendations: -Continue remote monitoring via PPM device -Continue metoprolol tartrate 12.5mg po q12hr -Continue to follow with cardiologist Dr Jones -Follow-up with Dr Green 9/25/24, discuss concerns regarding PPM location and possible revision

## 2024-07-31 NOTE — DISCUSSION/SUMMARY
[EKG obtained to assist in diagnosis and management of assessed problem(s)] : EKG obtained to assist in diagnosis and management of assessed problem(s) [FreeTextEntry1] : Pt is an 85 y/o F PMH CHB s/p PPM 06/2024, HTN, HLD, RBBB, mild AS, mild-mod AI, anxiety, provoked DVT 12/2023 after hip surgery now off Eliquis   TTE 01/2021 EF 60-65%, mild MR, mild-mod AI TTE 01/2017 EF 70%, mild DD, mild-mod MR, mild TR TTE 05/2024 EF 55-60%, mild AS, mild-mod AI, mild MR stress echo 01/2017 borderline study, possible small region of apical anteroseptal insufficiency montano 05/2021 NSR SVT episodes - longest 12min CUS 04/2021 mild plaque  CHB s/p PPM: Dr Green 06/03/2024 doing well   RBBB: unchanged  AS: mild will monitor closely  HTN: will stop metoprolol pt refuses to take any other BP medications Discussed the long-term health risks of uncontrolled BP.  Advised low salt diet, regular exercise, maintaining ideal weight. Encouraged pt to check BP at home and keep journal   HLD: Advised lifestyle modifications   Pt will return in 6 mnths or sooner as needed but I encouraged communication via phone or portal if necessary.  We will call pt with test results when applicable and arrange for expedited follow up if necessary.  The described plan was discussed with the pt.  All questions and concerns were addressed to the best of my knowledge.

## 2024-07-31 NOTE — CARDIOLOGY SUMMARY
[de-identified] : 7/22/24: SR 75bpm with old RBBB 6/18/24: Paced 67bpm [de-identified] : 5/31/24 TTE:  1. Left ventricular cavity is normal in size. Left ventricular wall thickness is normal. Left ventricular systolic function is normal with an ejection fraction visually estimated at 55 to 60 %.  2. There is mild (grade 1) left ventricular diastolic dysfunction, with normal filling pressure.  3. Normal right ventricular cavity size and normal systolic function.  4. The left atrium is normal in size.  5. The right atrium is normal in size.  6. There is mild calcification of the mitral valve annulus.  7. Thickened mitral valve leaflets. Moderate mitral valve leaflet calcification.  8. Mild mitral regurgitation.  9. Trileaflet aortic valve. Mild aortic stenosis. 10. Mild to moderate aortic regurgitation. 11. Trace tricuspid regurgitation. Pulmonary artery systolic pressure could not be estimated. 12. No pericardial effusion seen.

## 2024-07-31 NOTE — HISTORY OF PRESENT ILLNESS
[FreeTextEntry1] : 84 year old female with a history HLD, hypothyroidism, RBBB, mild-mod AI, anxiety, R hip surgery 12/31/23 (fractured hip) complicated by RLE DVT, who presented to [facility] after being sent in by Dr Jones (cardiologist) for concern of heart block on Holter monitor. On arrival patient noted to have 2:1 AV block and then resumption of normal conduction. EF preserved on TTE. Transferred to the ICU after an episode of transient CHB associated with dizziness and pauses up to 5 seconds. TVP was placed via right IJ. On 6/3/24 underwent successful dual chamber pacemaker implant (left bundle branch area) via axillary vein access by Dr Green. Presents today for follow-up.  Reports overall doing well, denies palpitations, dizziness or chest pain, she reports occasional episodes of SOB that she related to anxiety. PPM site LACW with wound edges well-approximated, no redness/swelling/drainage, FLO neurovascular intact. She expresses concern with the location of the device and some muscular shoulder stiffness.    MDT DC PPM Device interrogation with well-functioning device, battery-life remaining 13.5years, AP 7.5%,  14%, sensing/impedance/threshold stable, 1-episode brief ST and 1-episode brief pAT,   Currently taking metoprolol 12.5mg po q12hr, reports she would like to stop metoprolol as she believes it caused her weight gain and joint pain. She does not need to be on BB therapy from an EP perspective and she was advised to follow-up with Dr Jones regarding med adjustment for BP management. Eliquis for previous DVT has been discontinued

## 2024-07-31 NOTE — CARDIOLOGY SUMMARY
[de-identified] : 7/22/24: SR 75bpm with old RBBB 6/18/24: Paced 67bpm [de-identified] : 5/31/24 TTE:  1. Left ventricular cavity is normal in size. Left ventricular wall thickness is normal. Left ventricular systolic function is normal with an ejection fraction visually estimated at 55 to 60 %.  2. There is mild (grade 1) left ventricular diastolic dysfunction, with normal filling pressure.  3. Normal right ventricular cavity size and normal systolic function.  4. The left atrium is normal in size.  5. The right atrium is normal in size.  6. There is mild calcification of the mitral valve annulus.  7. Thickened mitral valve leaflets. Moderate mitral valve leaflet calcification.  8. Mild mitral regurgitation.  9. Trileaflet aortic valve. Mild aortic stenosis. 10. Mild to moderate aortic regurgitation. 11. Trace tricuspid regurgitation. Pulmonary artery systolic pressure could not be estimated. 12. No pericardial effusion seen.

## 2024-07-31 NOTE — PHYSICAL EXAM
[Well Developed] : well developed [Well Nourished] : well nourished [No Acute Distress] : no acute distress [Normal Conjunctiva] : normal conjunctiva [Normal Venous Pressure] : normal venous pressure [No Carotid Bruit] : no carotid bruit [Normal S1, S2] : normal S1, S2 [No Rub] : no rub [No Gallop] : no gallop [Clear Lung Fields] : clear lung fields [Good Air Entry] : good air entry [No Respiratory Distress] : no respiratory distress  [Soft] : abdomen soft [Non Tender] : non-tender [No Masses/organomegaly] : no masses/organomegaly [Normal Bowel Sounds] : normal bowel sounds [No Edema] : no edema [No Cyanosis] : no cyanosis [No Clubbing] : no clubbing [No Varicosities] : no varicosities [No Rash] : no rash [No Skin Lesions] : no skin lesions [Moves all extremities] : moves all extremities [No Focal Deficits] : no focal deficits [Normal Speech] : normal speech [Alert and Oriented] : alert and oriented [Normal memory] : normal memory [de-identified] : +sys murmur [de-identified] : ambulates with walker

## 2024-07-31 NOTE — HISTORY OF PRESENT ILLNESS
[FreeTextEntry1] : 84 year old female with a history HLD, hypothyroidism, RBBB, mild-mod AI, anxiety, R hip surgery 12/31/23 (fractured hip) complicated by RLE DVT, who presented to [facility] after being sent in by Dr Jones (cardiologist) for concern of heart block on Holter monitor. On arrival patient noted to have 2:1 AV block and then resumption of normal conduction. EF preserved on TTE. Transferred to the ICU after an episode of transient CHB associated with dizziness and pauses up to 5 seconds. TVP was placed via right IJ. On 6/3/24 underwent successful dual chamber pacemaker implant (left bundle branch area) via axillary vein access by Dr Green. Presents today for follow-up.  Reports overall doing well, denies palpitations, dizziness or chest pain, she reports occasional episodes of SOB that she related to anxiety. PPM site LACW with wound edges well-approximated, no redness/swelling/drainage, FLO neurovascular intact. She expresses concern with the location of the device and some muscular shoulder stiffness.    MDT DC PPM Device interrogation with well-functioning device, battery-life remaining 13.5years, AP 7.5%,  14%, sensing/impedance/threshold stable, 1-episode brief ST and 1-episode brief pAT,   Currently taking metoprolol 12.5mg po q12hr, reports she would like to stop metoprolol as she believes it caused her weight gain and joint pain. She does not need to be on BB therapy from an EP perspective and she was advised to follow-up with Dr Jones regarding med adjustment for BP management. Eliquis for previous DVT has been discontinued

## 2024-07-31 NOTE — REVIEW OF SYSTEMS
[Dyspnea on exertion] : dyspnea during exertion [Feeling Fatigued] : not feeling fatigued [SOB] : no shortness of breath [Chest Discomfort] : no chest discomfort [Lower Ext Edema] : no extremity edema [Palpitations] : no palpitations [Orthopnea] : no orthopnea [PND] : no PND [Syncope] : no syncope [Dizziness] : no dizziness [Easy Bleeding] : no tendency for easy bleeding

## 2024-07-31 NOTE — HISTORY OF PRESENT ILLNESS
[FreeTextEntry1] : Pt is an 83 y/o F PMH CHB s/p PPM 06/2024 with Dr Green, HLD, HTN, RBBB, mild AS, mild-mod AI, anxiety, DVT 12/31/2023 after R hip surgery (fractured hip) - Eliquis has been stopped   Pt thinks that metoprolol has been causing her muscle aches, fatigue, weight gain.  She wishes to stop metoprolol.   She mentions that she has not been taking levothyroxine and is seeing a holistic Dr. She otherwise feels well - denies CP, SOB, diaphoresis, palpitations, dizziness, syncope, LE edema, PND, orthopnea.  Home 's/80's  TTE 01/2021 EF 60-65%, mild MR, mild-mod AI TTE 01/2017 EF 70%, mild DD, mild-mod MR, mild TR TTE 05/2024 EF 55-60%, mild AS, mild-mod AI, mild MR stress echo 01/2017 borderline study, possible small region of apical anteroseptal insufficiency montano 05/2021 NSR SVT episodes - longest 12min CUS 04/2021 mild plaque  PMH: HLD, hypothyroidism, GERD PCP Dr Inocente Sanchez Smoking status: never no ETOH no drug use Current exercise: none Daily water intake: 6 glasses Daily caffeine intake: none Family hx: father CVA 73, sister colon cancer Previous hospitalizations: 01/2021 allergic reaction, phlebitis 4 children - no problem with pregnancies

## 2024-07-31 NOTE — END OF VISIT
[Time Spent: ___ minutes] : I have spent [unfilled] minutes of time on the encounter. [FreeTextEntry3] : I, Dr. Green, personally performed the evaluation and management (E/M) services for this new patient. That E/M includes conducting the clinically appropriate initial history &/or exam, assessing all conditions, and establishing the plan of care. Today, my assistant, Callie Souza NP, was here to observe my evaluation and management service for this patient & follow plan of care established by me going forward.

## 2024-08-02 RX ORDER — LEVOTHYROXINE SODIUM 0.03 MG/1
25 TABLET ORAL
Qty: 90 | Refills: 1 | Status: ACTIVE | COMMUNITY
Start: 2024-08-01

## 2024-08-05 ENCOUNTER — NON-APPOINTMENT (OUTPATIENT)
Age: 84
End: 2024-08-05

## 2024-08-22 ENCOUNTER — APPOINTMENT (OUTPATIENT)
Dept: CARDIOLOGY | Facility: CLINIC | Age: 84
End: 2024-08-22
Payer: MEDICARE

## 2024-08-22 ENCOUNTER — NON-APPOINTMENT (OUTPATIENT)
Age: 84
End: 2024-08-22

## 2024-08-22 VITALS
SYSTOLIC BLOOD PRESSURE: 140 MMHG | WEIGHT: 146 LBS | HEART RATE: 85 BPM | DIASTOLIC BLOOD PRESSURE: 72 MMHG | OXYGEN SATURATION: 96 % | HEIGHT: 63 IN | BODY MASS INDEX: 25.87 KG/M2

## 2024-08-22 DIAGNOSIS — R52 PAIN, UNSPECIFIED: ICD-10-CM

## 2024-08-22 PROCEDURE — 99214 OFFICE O/P EST MOD 30 MIN: CPT

## 2024-08-22 PROCEDURE — 93000 ELECTROCARDIOGRAM COMPLETE: CPT

## 2024-08-23 ENCOUNTER — NON-APPOINTMENT (OUTPATIENT)
Age: 84
End: 2024-08-23

## 2024-08-23 ENCOUNTER — APPOINTMENT (OUTPATIENT)
Dept: CARDIOLOGY | Facility: CLINIC | Age: 84
End: 2024-08-23
Payer: MEDICARE

## 2024-08-23 VITALS
HEIGHT: 63 IN | OXYGEN SATURATION: 97 % | DIASTOLIC BLOOD PRESSURE: 86 MMHG | BODY MASS INDEX: 25.87 KG/M2 | SYSTOLIC BLOOD PRESSURE: 150 MMHG | WEIGHT: 146 LBS | HEART RATE: 85 BPM

## 2024-08-23 DIAGNOSIS — I45.10 UNSPECIFIED RIGHT BUNDLE-BRANCH BLOCK: ICD-10-CM

## 2024-08-23 PROCEDURE — 93000 ELECTROCARDIOGRAM COMPLETE: CPT

## 2024-08-23 PROCEDURE — 99214 OFFICE O/P EST MOD 30 MIN: CPT | Mod: 25

## 2024-08-23 RX ORDER — AMLODIPINE BESYLATE 2.5 MG/1
2.5 TABLET ORAL
Qty: 90 | Refills: 3 | Status: ACTIVE | COMMUNITY
Start: 2024-08-23 | End: 1900-01-01

## 2024-08-23 NOTE — REASON FOR VISIT
[Arrhythmia/ECG Abnorrmalities] : arrhythmia/ECG abnormalities [Hypertension] : hypertension [Hyperlipidemia] : hyperlipidemia

## 2024-08-23 NOTE — HISTORY OF PRESENT ILLNESS
[FreeTextEntry1] : Pt is an 85 y/o F PMH CHB s/p PPM 06/2024 with Dr Green, HLD, HTN, RBBB, mild AS, mild-mod AI, anxiety, DVT 12/31/2023 after R hip surgery (fractured hip) - Eliquis has been stopped   She mentions that she has not been taking levothyroxine, now following endo who is going to repeat labs.  She is c/o b/l shoulder pain and neck pain - she is going to see ortho.  She otherwise feels well - denies CP, SOB, diaphoresis, palpitations, dizziness, syncope, LE edema, PND, orthopnea.  Home 's/80's  TTE 01/2021 EF 60-65%, mild MR, mild-mod AI TTE 01/2017 EF 70%, mild DD, mild-mod MR, mild TR TTE 05/2024 EF 55-60%, mild AS, mild-mod AI, mild MR stress echo 01/2017 borderline study, possible small region of apical anteroseptal insufficiency montano 05/2021 NSR SVT episodes - longest 12min CUS 04/2021 mild plaque  metoprolol caused itching, muscle aches, fatigue, weight gain  pt sees endo for hypothyroidism PMH: HLD, hypothyroidism, GERD PCP Dr Ioncente Sanchez Smoking status: never no ETOH no drug use Current exercise: none Daily water intake: 6 glasses Daily caffeine intake: none Family hx: father CVA 73, sister colon cancer Previous hospitalizations: 01/2021 allergic reaction, phlebitis 4 children - no problem with pregnancies

## 2024-08-23 NOTE — DISCUSSION/SUMMARY
[EKG obtained to assist in diagnosis and management of assessed problem(s)] : EKG obtained to assist in diagnosis and management of assessed problem(s) [FreeTextEntry1] : Pt is an 83 y/o F PMH CHB s/p PPM 06/2024, HTN, HLD, RBBB, mild AS, mild-mod AI, anxiety, provoked DVT 12/2023 after hip surgery now off Eliquis   TTE 01/2021 EF 60-65%, mild MR, mild-mod AI TTE 01/2017 EF 70%, mild DD, mild-mod MR, mild TR TTE 05/2024 EF 55-60%, mild AS, mild-mod AI, mild MR stress echo 01/2017 borderline study, possible small region of apical anteroseptal insufficiency montano 05/2021 NSR SVT episodes - longest 12min CUS 04/2021 mild plaque  CHB s/p PPM: Dr Green 06/03/2024 doing well   RBBB: unchanged  AS: mild will monitor closely  HTN: elevated start amlodipine 2.5mg qd Discussed the long-term health risks of uncontrolled BP.  Advised low salt diet, regular exercise, maintaining ideal weight. Encouraged pt to check BP at home and keep journal   HLD: Advised lifestyle modifications   Pt will return in 6 mnths or sooner as needed but I encouraged communication via phone or portal if necessary.  We will call pt with test results when applicable and arrange for expedited follow up if necessary.  The described plan was discussed with the pt.  All questions and concerns were addressed to the best of my knowledge.

## 2024-08-23 NOTE — PHYSICAL EXAM
[Well Developed] : well developed [Well Nourished] : well nourished [No Acute Distress] : no acute distress [Normal Conjunctiva] : normal conjunctiva [Normal Venous Pressure] : normal venous pressure [No Carotid Bruit] : no carotid bruit [Normal S1, S2] : normal S1, S2 [No Rub] : no rub [No Gallop] : no gallop [Clear Lung Fields] : clear lung fields [Good Air Entry] : good air entry [No Respiratory Distress] : no respiratory distress  [Soft] : abdomen soft [Non Tender] : non-tender [No Masses/organomegaly] : no masses/organomegaly [Normal Bowel Sounds] : normal bowel sounds [No Edema] : no edema [No Cyanosis] : no cyanosis [No Clubbing] : no clubbing [No Varicosities] : no varicosities [No Rash] : no rash [No Skin Lesions] : no skin lesions [Moves all extremities] : moves all extremities [No Focal Deficits] : no focal deficits [Normal Speech] : normal speech [Alert and Oriented] : alert and oriented [Normal memory] : normal memory [de-identified] : +sys murmur [de-identified] : ambulates with walker

## 2024-08-26 ENCOUNTER — APPOINTMENT (OUTPATIENT)
Dept: ORTHOPEDIC SURGERY | Facility: CLINIC | Age: 84
End: 2024-08-26
Payer: MEDICARE

## 2024-08-26 VITALS
BODY MASS INDEX: 25.87 KG/M2 | WEIGHT: 146 LBS | HEART RATE: 76 BPM | DIASTOLIC BLOOD PRESSURE: 72 MMHG | HEIGHT: 63 IN | SYSTOLIC BLOOD PRESSURE: 163 MMHG

## 2024-08-26 DIAGNOSIS — I44.2 ATRIOVENTRICULAR BLOCK, COMPLETE: ICD-10-CM

## 2024-08-26 DIAGNOSIS — M17.11 UNILATERAL PRIMARY OSTEOARTHRITIS, RIGHT KNEE: ICD-10-CM

## 2024-08-26 DIAGNOSIS — I10 ESSENTIAL (PRIMARY) HYPERTENSION: ICD-10-CM

## 2024-08-26 DIAGNOSIS — M21.00 VALGUS DEFORMITY, NOT ELSEWHERE CLASSIFIED, UNSPECIFIED SITE: ICD-10-CM

## 2024-08-26 DIAGNOSIS — Z95.0 PRESENCE OF CARDIAC PACEMAKER: ICD-10-CM

## 2024-08-26 PROCEDURE — 99214 OFFICE O/P EST MOD 30 MIN: CPT | Mod: 25

## 2024-08-26 PROCEDURE — 20610 DRAIN/INJ JOINT/BURSA W/O US: CPT | Mod: RT

## 2024-08-26 PROCEDURE — 73564 X-RAY EXAM KNEE 4 OR MORE: CPT | Mod: RT

## 2024-08-26 NOTE — HISTORY OF PRESENT ILLNESS
[de-identified] : This is a 84 year old F pt presents today for right knee pain. The pain in mainly at the lateral aspect of the knee with a aching, dull sensation. Pt notes that she has been seen in the past and got gel injections with other provider. She states that the gel injection did not help. She is having worsening pain in the past few years. No recent trauma or injury. Pt is interested in surgical option of the right knee. The pt is having worse pain with walking, stairs, exercise, getting up from seated position. Her quality of life is deteriorating. The pt has exhausted over 3 months of conservative treatment including HA and cortisone injections, home therapy, anti-inflammatories, Tylenol.  Pt has a pacemaker. [Worsening] : worsening [___ yrs] : [unfilled] year(s) ago [7] : a current pain level of 7/10 [Walking] : walking [Sitting] : sitting [Standing] : standing [Intermit.] : ~He/She~ states the symptoms seem to be intermittent

## 2024-08-26 NOTE — PHYSICAL EXAM
[de-identified] : GENERAL APPEARANCE: Well nourished and hydrated, pleasant, alert, and oriented x 3. Appears their stated age. HEENT: Normocephalic, extraocular eye motion intact. Nasal septum midline. Oral cavity clear. External auditory canal clear. RESPIRATORY: Breath sounds clear and audible in all lobes. No wheezing, No accessory muscle use. CARDIOVASCULAR: No apparent abnormalities. No lower leg edema. No varicosities. Pedal pulses are palpable. NEUROLOGIC: Sensation is normal, no muscle weakness in the upper or lower extremities. DERMATOLOGIC: No apparent skin lesions, moist, warm, no rash. SPINE: Cervical spine appears normal and moves freely; thoracic spine appears normal and moves freely; lumbosacral spine appears normal and moves freely, normal, nontender. MUSCULOSKELETAL: Hands, wrists, and elbows are normal and move freely, shoulders are normal and move freely. PSYCHIATRIC: Oriented to person, place, and time, insight and judgement were intact and the affect was normal. [de-identified] : Right knee exam shows lateral jointline tenderness, ROM 0-110. Severe valgus deformity. [de-identified] : 4V xray of the right knee done in the office today and reviewed by Dr. 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

## 2024-08-26 NOTE — END OF VISIT
[FreeTextEntry3] : I, Marin Phillips, acted solely as a scribe for Dr. Suresh Robertson on this date 08/26/2024. I personally performed the services described in the documentation, reviewed the documentation recorded by the scribe in my presence, and it accurately and completely records my words and actions.   I, Suresh Robertson MD, personally performed the services described in the documentation, reviewed the documentation recorded by the scribe in my presence and it accurately and completely records my words and actions.

## 2024-08-26 NOTE — DISCUSSION/SUMMARY
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 The nature of condition and treatment options were discussed in detail. Pt is a candidate for right TKA. The surgery was discussed in detail including pre-op and post-op. Pt understands that she is under the risk of foot drop after surgery due to valgus knee. The pt is having worse pain with walking, stairs, exercise, getting up from seated position. Her quality of life is deteriorating. The pt has exhausted over 3 months of conservative treatment including HA and cortisone injections, home therapy, anti-inflammatories, Tylenol. I believe right TKA is reasonable. The pt will talk with the surgical coordinator to schedule a right TKA. The pt opted for RIGHT knee cortisone injection which she tolerated well.  Pt understands that they cannot have surgery within 3 months of receiving an IA injection.  Pt understands that she will require cardiac clearance prior to surgery. All questions and concerns were answered.   The patient is a 84 year old individual with end stage arthritis of their right knee joint. The patient has exhausted a minimum of 3 months conservative treatment including prior injections (cortisone and/or hyaluronic acid injections), physical therapy, over the counter NSAIDS and pain medication where indicated. In addition, the patient's quality of life is diminished due to significant chronic pain. The patient is having difficulty with activities of daily living, including ambulating, descending stairs, and rising from a seated position. Based upon the patients continued symptoms and failure to respond to conservative treatment, I have recommended a right total knee replacement for this patient. A long discussion took place with the patient describing what a total joint replacement is and what the procedure would entail. A knee model, similar to the implant that will be used during the operation, was utilized to demonstrate and to discuss the various bearing surfaces of the implants. Implant fixation, use of cement, was also discussed with the patient. Choices of implant manufacturers, mainly [employer] and [employer], were discussed and reviewed with preference to be made by patient and surgeon prior to operation. Final selection to be based on customary practice as well as preoperative templating with selection confirmed intraoperatively based on the patient's anatomy. The patient participated and agreed with the decision-making process. The hospitalization and post-operative care and rehabilitation were also discussed. The use of perioperative antibiotics and DVT prophylaxis were discussed. The risk, benefits and alternatives to a surgical intervention were discussed at length with the patient. The patient was also advised of risks related to the medical comorbidities and elevated body mass index (BMI). A lengthy discussion took place to review the most common complications including but not limited to: deep vein thrombosis, pulmonary embolism, heart attack, stroke, infection, wound breakdown, numbness, damage to nerves, tendon, muscles, arteries or other blood vessels, death and other possible complications from anesthesia. The patient was told that we will take steps to minimize these risks by using sterile technique, antibiotics and DVT prophylaxis when appropriate and follow the patient postoperatively in the office setting to monitor progress. The possibility of recurrent pain, no improvement in pain and actual worsening of pain were also discussed with the patient.   The discharge plan of care focused on the patient going home following surgery. The patient was encouraged to make the necessary arrangements to have someone stay with them when they are discharged home. Following discharge, a home care nurse will visit the patient. The home care nurse will open your home care case and request home physical therapy services. Home physical therapy will commence following discharge provided it is appropriate and covered by the health insurance benefit plan.   The benefits of surgery were discussed with the patient including the potential for improving his/her current clinical condition through operative intervention. Alternatives to surgical intervention including continued conservative management were also discussed in detail. All questions were answered to the satisfaction of the patient. The treatment plan of care, as well as a model of a total knee equivalent to the one that will be used for their total joint replacement, was shared with the patient. The patient participated and agreed to the plan of care as well as the use of the recommended implants for their total joint replacement surgery.

## 2024-08-26 NOTE — PROCEDURE
[de-identified] : I injected the patient's right knee today with cortisone for primary osteoarthritis.  I discussed at length with the patient the planned steroid and lidocaine injection. The risks, benefits, convalescence and alternatives were reviewed. The possible side effects discussed included but were not limited to: pain, swelling, heat, bleeding, and redness. Symptoms are generally mild but if they are extensive then contact the office. Giving pain relievers by mouth such as NSAIDs or Tylenol can generally treat the reactions to steroid and lidocaine. Rare cases of infection have been noted. Rash, hives and itching may occur post injection. If you have muscle pain or cramps, flushing and or swelling of the face, rapid heart beat, nausea, dizziness, fever, chills, headache, difficulty breathing, swelling in the arms or legs, or have a prickly feeling of your skin, contact a health care provider immediately. Following this discussion, the knee was prepped with Alcohol and under sterile condition the 80 mg Depo-Medrol and 6 cc Lidocaine injection was performed with a 20 gauge needle through a superolateral injection site. The needle was introduced into the joint, aspiration was performed to ensure intra-articular placement and the medication was injected. Upon withdrawal of the needle the site was cleaned with alcohol and a band aid applied. The patient tolerated the injection well and there were no adverse effects. Post injection instructions included no strenuous activity for 24 hours, cryotherapy and if there are any adverse effects to contact the office.

## 2024-08-30 DIAGNOSIS — E03.9 HYPOTHYROIDISM, UNSPECIFIED: ICD-10-CM

## 2024-08-30 DIAGNOSIS — R79.89 OTHER SPECIFIED ABNORMAL FINDINGS OF BLOOD CHEMISTRY: ICD-10-CM

## 2024-09-03 ENCOUNTER — APPOINTMENT (OUTPATIENT)
Dept: VASCULAR SURGERY | Facility: CLINIC | Age: 84
End: 2024-09-03
Payer: MEDICARE

## 2024-09-03 VITALS
BODY MASS INDEX: 25.69 KG/M2 | OXYGEN SATURATION: 97 % | TEMPERATURE: 97.5 F | SYSTOLIC BLOOD PRESSURE: 182 MMHG | DIASTOLIC BLOOD PRESSURE: 78 MMHG | HEART RATE: 67 BPM | HEIGHT: 63 IN | RESPIRATION RATE: 16 BRPM | WEIGHT: 145 LBS

## 2024-09-03 DIAGNOSIS — M79.606 PAIN IN LEG, UNSPECIFIED: ICD-10-CM

## 2024-09-03 DIAGNOSIS — M79.604 PAIN IN RIGHT LEG: ICD-10-CM

## 2024-09-03 PROCEDURE — 99213 OFFICE O/P EST LOW 20 MIN: CPT

## 2024-09-03 PROCEDURE — 93971 EXTREMITY STUDY: CPT

## 2024-09-03 NOTE — PHYSICAL EXAM
[Respiratory Effort] : normal respiratory effort [2+] : left 2+ [de-identified] :  Appears well [de-identified] : Appears well.  Appears well bilateral lower extremities without any evidence of swelling.  Right thigh is nontender to palpation no evidence of hematoma or ecchymosis.

## 2024-09-03 NOTE — HISTORY OF PRESENT ILLNESS
[de-identified] : Patient's with known history of right gastrocnemius vein DVT.  Got concerned because over the weekend she developed pain in her right anterior lateral thigh.  It is present it is not made worse by walking.  She was concerned that she had developed another blood clot.  She otherwise feels well.

## 2024-09-03 NOTE — ASSESSMENT
[FreeTextEntry1] : 84-year-old female with known previous history of right gastrocnemius vein DVT.  Had some concerns for recurrent DVT because of right anterior thigh pain.  Ultrasound shows no evidence of acute DVT.  Patient was reassured that there was no vascular etiology of her symptoms.  Total encounter total time 22 mins >50% of time spent counseling/coordinating care

## 2024-09-25 ENCOUNTER — APPOINTMENT (OUTPATIENT)
Dept: ELECTROPHYSIOLOGY | Facility: CLINIC | Age: 84
End: 2024-09-25
Payer: MEDICARE

## 2024-09-25 VITALS
DIASTOLIC BLOOD PRESSURE: 74 MMHG | WEIGHT: 145 LBS | HEART RATE: 75 BPM | BODY MASS INDEX: 25.69 KG/M2 | OXYGEN SATURATION: 96 % | SYSTOLIC BLOOD PRESSURE: 152 MMHG | HEIGHT: 63 IN

## 2024-09-25 DIAGNOSIS — I44.2 ATRIOVENTRICULAR BLOCK, COMPLETE: ICD-10-CM

## 2024-09-25 DIAGNOSIS — I45.10 UNSPECIFIED RIGHT BUNDLE-BRANCH BLOCK: ICD-10-CM

## 2024-09-25 PROCEDURE — 93000 ELECTROCARDIOGRAM COMPLETE: CPT

## 2024-09-25 PROCEDURE — 99214 OFFICE O/P EST MOD 30 MIN: CPT

## 2024-09-25 RX ORDER — ORAL SEMAGLUTIDE 3 MG/1
3 TABLET ORAL DAILY
Refills: 0 | Status: ACTIVE | COMMUNITY

## 2024-09-25 NOTE — DISCUSSION/SUMMARY
[FreeTextEntry1] : In summary, the patient is an 84-year-old female with a history of heart block s/p dual chamber LBB area pacemaker. Device interrogation today shows normal function and good battery life. Brief SVT is seen. She notes occasional palpitations but prefers not to be on a beta-blocker. She also notes some left shoulder discomfort and is concerned about her device location. The device does not seem particularly close to the shoulder but does move with arm movement. The site of discomfort is away from the location of the pacemaker. She does have some degree of shoulder stiffness. Will reassess at next visit whether pocket revision could be useful. Would also consider a calcium channel blocker in the future if symptomatic SVT becomes more frequent.  [EKG obtained to assist in diagnosis and management of assessed problem(s)] : EKG obtained to assist in diagnosis and management of assessed problem(s)

## 2024-09-25 NOTE — HISTORY OF PRESENT ILLNESS
[FreeTextEntry1] : The patient is an 84-year-old female presenting for follow up today. The patient has a history of HLD, hypothyroidism, RBBB, mild-mod AI, anxiety, R hip surgery 12/31/23 (fractured hip) complicated by RLE DVT, and heart block s/p dual chamber PPM (LBB area pacing, [employer]).   To summarize, the patient presented to [facility] after being sent in by her cardiologist Dr Jones for concern of heart block seen on Holter monitor. On arrival patient was noted to have 2:1 AV block and then resumption of normal conduction. EF was preserved on TTE. Transferred to the ICU after an episode of transient CHB associated with dizziness and pauses up to 5 seconds. TVP was placed via right IJ. On 6/3/24 underwent successful dual chamber pacemaker implant (left bundle branch area).   The patient presents for follow up today. The patient denies any symptoms of shortness of breath, dizziness, chest pain, syncope or extremity edema. She notes occasional palpitations and expresses concern with the location of the device and some muscular shoulder stiffness.

## 2024-10-01 RX ORDER — ATENOLOL 25 MG/1
25 TABLET ORAL
Qty: 90 | Refills: 3 | Status: ACTIVE | COMMUNITY
Start: 2024-10-01

## 2024-10-15 ENCOUNTER — APPOINTMENT (OUTPATIENT)
Dept: UROGYNECOLOGY | Facility: CLINIC | Age: 84
End: 2024-10-15

## 2024-10-15 ENCOUNTER — NON-APPOINTMENT (OUTPATIENT)
Age: 84
End: 2024-10-15

## 2024-10-15 VITALS
WEIGHT: 145 LBS | RESPIRATION RATE: 16 BRPM | BODY MASS INDEX: 25.69 KG/M2 | SYSTOLIC BLOOD PRESSURE: 198 MMHG | HEIGHT: 63 IN | DIASTOLIC BLOOD PRESSURE: 74 MMHG | HEART RATE: 72 BPM

## 2024-10-15 DIAGNOSIS — R32 UNSPECIFIED URINARY INCONTINENCE: ICD-10-CM

## 2024-10-15 DIAGNOSIS — R39.15 URGENCY OF URINATION: ICD-10-CM

## 2024-10-15 DIAGNOSIS — R35.0 FREQUENCY OF MICTURITION: ICD-10-CM

## 2024-10-15 PROCEDURE — 99214 OFFICE O/P EST MOD 30 MIN: CPT

## 2024-10-15 PROCEDURE — 81003 URINALYSIS AUTO W/O SCOPE: CPT | Mod: QW

## 2024-10-17 LAB
BILIRUB UR QL STRIP: NORMAL
CLARITY UR: CLEAR
COLLECTION METHOD: NORMAL
GLUCOSE UR-MCNC: NEGATIVE
HCG UR QL: 0.2 EU/DL
HGB UR QL STRIP.AUTO: NORMAL
KETONES UR-MCNC: NEGATIVE
LEUKOCYTE ESTERASE UR QL STRIP: NEGATIVE
NITRITE UR QL STRIP: NEGATIVE
PH UR STRIP: 6
PROT UR STRIP-MCNC: NEGATIVE
SP GR UR STRIP: 1.02

## 2024-10-18 RX ORDER — TROSPIUM CHLORIDE 60 MG/1
60 CAPSULE, EXTENDED RELEASE ORAL
Qty: 30 | Refills: 0 | Status: ACTIVE | COMMUNITY
Start: 2024-10-17 | End: 1900-01-01

## 2024-10-19 ENCOUNTER — APPOINTMENT (OUTPATIENT)
Dept: FAMILY MEDICINE | Facility: CLINIC | Age: 84
End: 2024-10-19
Payer: MEDICARE

## 2024-10-19 VITALS
WEIGHT: 145 LBS | DIASTOLIC BLOOD PRESSURE: 80 MMHG | HEART RATE: 74 BPM | BODY MASS INDEX: 25.69 KG/M2 | SYSTOLIC BLOOD PRESSURE: 160 MMHG | OXYGEN SATURATION: 96 % | HEIGHT: 63 IN | TEMPERATURE: 97.7 F

## 2024-10-19 PROCEDURE — 99214 OFFICE O/P EST MOD 30 MIN: CPT

## 2024-10-19 RX ORDER — AMLODIPINE BESYLATE 2.5 MG/1
2.5 TABLET ORAL
Qty: 90 | Refills: 1 | Status: DISCONTINUED | COMMUNITY
Start: 2024-10-15 | End: 2024-10-19

## 2024-10-23 ENCOUNTER — NON-APPOINTMENT (OUTPATIENT)
Age: 84
End: 2024-10-23

## 2024-10-23 ENCOUNTER — APPOINTMENT (OUTPATIENT)
Dept: UROGYNECOLOGY | Facility: CLINIC | Age: 84
End: 2024-10-23

## 2024-10-24 ENCOUNTER — APPOINTMENT (OUTPATIENT)
Dept: CARDIOLOGY | Facility: CLINIC | Age: 84
End: 2024-10-24
Payer: MEDICARE

## 2024-10-24 ENCOUNTER — NON-APPOINTMENT (OUTPATIENT)
Age: 84
End: 2024-10-24

## 2024-10-24 VITALS
HEART RATE: 68 BPM | OXYGEN SATURATION: 98 % | WEIGHT: 146 LBS | DIASTOLIC BLOOD PRESSURE: 74 MMHG | BODY MASS INDEX: 25.86 KG/M2 | SYSTOLIC BLOOD PRESSURE: 122 MMHG

## 2024-10-24 PROCEDURE — 93000 ELECTROCARDIOGRAM COMPLETE: CPT

## 2024-10-24 PROCEDURE — 99214 OFFICE O/P EST MOD 30 MIN: CPT | Mod: 25

## 2024-10-24 RX ORDER — CHLORTHALIDONE 25 MG/1
25 TABLET ORAL
Qty: 90 | Refills: 2 | Status: DISCONTINUED | COMMUNITY
Start: 2024-10-21 | End: 2024-10-24

## 2024-10-24 RX ORDER — AMLODIPINE BESYLATE 2.5 MG/1
2.5 TABLET ORAL
Qty: 90 | Refills: 3 | Status: ACTIVE | COMMUNITY
Start: 2024-10-24 | End: 1900-01-01

## 2024-10-24 RX ORDER — LOSARTAN POTASSIUM 50 MG/1
50 TABLET, FILM COATED ORAL
Qty: 90 | Refills: 1 | Status: DISCONTINUED | COMMUNITY
Start: 2024-10-16 | End: 2024-10-24

## 2024-10-30 ENCOUNTER — APPOINTMENT (OUTPATIENT)
Dept: UROGYNECOLOGY | Facility: CLINIC | Age: 84
End: 2024-10-30

## 2024-10-30 ENCOUNTER — APPOINTMENT (OUTPATIENT)
Dept: DERMATOLOGY | Facility: CLINIC | Age: 84
End: 2024-10-30

## 2024-11-02 ENCOUNTER — APPOINTMENT (OUTPATIENT)
Dept: FAMILY MEDICINE | Facility: CLINIC | Age: 84
End: 2024-11-02

## 2024-11-02 ENCOUNTER — APPOINTMENT (OUTPATIENT)
Dept: FAMILY MEDICINE | Facility: CLINIC | Age: 84
End: 2024-11-02
Payer: MEDICARE

## 2024-11-02 ENCOUNTER — NON-APPOINTMENT (OUTPATIENT)
Age: 84
End: 2024-11-02

## 2024-11-02 VITALS
TEMPERATURE: 97.6 F | BODY MASS INDEX: 25.87 KG/M2 | OXYGEN SATURATION: 98 % | WEIGHT: 146 LBS | DIASTOLIC BLOOD PRESSURE: 58 MMHG | HEIGHT: 63 IN | HEART RATE: 81 BPM | SYSTOLIC BLOOD PRESSURE: 130 MMHG

## 2024-11-02 DIAGNOSIS — I10 ESSENTIAL (PRIMARY) HYPERTENSION: ICD-10-CM

## 2024-11-02 PROCEDURE — 93000 ELECTROCARDIOGRAM COMPLETE: CPT

## 2024-11-02 PROCEDURE — 99213 OFFICE O/P EST LOW 20 MIN: CPT

## 2024-11-06 ENCOUNTER — APPOINTMENT (OUTPATIENT)
Dept: UROGYNECOLOGY | Facility: CLINIC | Age: 84
End: 2024-11-06

## 2024-11-13 ENCOUNTER — APPOINTMENT (OUTPATIENT)
Dept: UROGYNECOLOGY | Facility: CLINIC | Age: 84
End: 2024-11-13

## 2024-11-15 ENCOUNTER — APPOINTMENT (OUTPATIENT)
Dept: ORTHOPEDIC SURGERY | Facility: CLINIC | Age: 84
End: 2024-11-15
Payer: MEDICARE

## 2024-11-15 VITALS
WEIGHT: 145 LBS | DIASTOLIC BLOOD PRESSURE: 80 MMHG | HEIGHT: 63 IN | BODY MASS INDEX: 25.69 KG/M2 | SYSTOLIC BLOOD PRESSURE: 160 MMHG | HEART RATE: 76 BPM

## 2024-11-15 DIAGNOSIS — M17.0 BILATERAL PRIMARY OSTEOARTHRITIS OF KNEE: ICD-10-CM

## 2024-11-15 PROCEDURE — 99213 OFFICE O/P EST LOW 20 MIN: CPT | Mod: 25

## 2024-11-15 PROCEDURE — 20610 DRAIN/INJ JOINT/BURSA W/O US: CPT | Mod: RT

## 2024-11-18 RX ORDER — AMLODIPINE BESYLATE 2.5 MG/1
2.5 TABLET ORAL
Qty: 90 | Refills: 2 | Status: ACTIVE | COMMUNITY
Start: 2024-11-18

## 2024-11-18 RX ORDER — LISINOPRIL 5 MG/1
5 TABLET ORAL
Qty: 90 | Refills: 2 | Status: COMPLETED | COMMUNITY
Start: 2024-11-18 | End: 2024-11-18

## 2024-11-20 ENCOUNTER — APPOINTMENT (OUTPATIENT)
Dept: UROGYNECOLOGY | Facility: CLINIC | Age: 84
End: 2024-11-20

## 2024-11-21 ENCOUNTER — APPOINTMENT (OUTPATIENT)
Dept: ORTHOPEDIC SURGERY | Facility: CLINIC | Age: 84
End: 2024-11-21

## 2024-11-27 ENCOUNTER — APPOINTMENT (OUTPATIENT)
Dept: UROGYNECOLOGY | Facility: CLINIC | Age: 84
End: 2024-11-27

## 2024-12-04 ENCOUNTER — APPOINTMENT (OUTPATIENT)
Dept: UROGYNECOLOGY | Facility: CLINIC | Age: 84
End: 2024-12-04

## 2024-12-10 ENCOUNTER — APPOINTMENT (OUTPATIENT)
Dept: FAMILY MEDICINE | Facility: CLINIC | Age: 84
End: 2024-12-10

## 2024-12-11 ENCOUNTER — APPOINTMENT (OUTPATIENT)
Dept: UROGYNECOLOGY | Facility: CLINIC | Age: 84
End: 2024-12-11

## 2024-12-16 ENCOUNTER — APPOINTMENT (OUTPATIENT)
Dept: FAMILY MEDICINE | Facility: CLINIC | Age: 84
End: 2024-12-16
Payer: MEDICARE

## 2024-12-16 VITALS
HEIGHT: 63 IN | HEART RATE: 78 BPM | SYSTOLIC BLOOD PRESSURE: 162 MMHG | TEMPERATURE: 97.1 F | WEIGHT: 142 LBS | BODY MASS INDEX: 25.16 KG/M2 | DIASTOLIC BLOOD PRESSURE: 68 MMHG | OXYGEN SATURATION: 96 %

## 2024-12-16 DIAGNOSIS — A09 INFECTIOUS GASTROENTERITIS AND COLITIS, UNSPECIFIED: ICD-10-CM

## 2024-12-16 PROCEDURE — 99213 OFFICE O/P EST LOW 20 MIN: CPT

## 2024-12-16 RX ORDER — VALSARTAN 40 MG/1
40 TABLET, COATED ORAL
Qty: 90 | Refills: 1 | Status: ACTIVE | COMMUNITY
Start: 2024-12-16

## 2024-12-16 RX ORDER — LOPERAMIDE HYDROCHLORIDE 2 MG/1
2 CAPSULE ORAL
Qty: 30 | Refills: 0 | Status: ACTIVE | COMMUNITY
Start: 2024-12-16 | End: 1900-01-01

## 2024-12-18 ENCOUNTER — APPOINTMENT (OUTPATIENT)
Dept: UROGYNECOLOGY | Facility: CLINIC | Age: 84
End: 2024-12-18

## 2024-12-20 LAB
ALBUMIN SERPL ELPH-MCNC: 4.2 G/DL
ALP BLD-CCNC: 77 U/L
ALT SERPL-CCNC: 9 U/L
ANION GAP SERPL CALC-SCNC: 14 MMOL/L
AST SERPL-CCNC: 16 U/L
BASOPHILS # BLD AUTO: 0.02 K/UL
BASOPHILS NFR BLD AUTO: 0.3 %
BILIRUB SERPL-MCNC: 0.4 MG/DL
BUN SERPL-MCNC: 10 MG/DL
CALCIUM SERPL-MCNC: 9.3 MG/DL
CDIFF BY PCR: NOT DETECTED
CHLORIDE SERPL-SCNC: 102 MMOL/L
CO2 SERPL-SCNC: 25 MMOL/L
CREAT SERPL-MCNC: 0.74 MG/DL
EGFR: 79 ML/MIN/1.73M2
EOSINOPHIL # BLD AUTO: 0.11 K/UL
EOSINOPHIL NFR BLD AUTO: 1.7 %
GLUCOSE SERPL-MCNC: 108 MG/DL
HCT VFR BLD CALC: 39.8 %
HGB BLD-MCNC: 12.7 G/DL
IMM GRANULOCYTES NFR BLD AUTO: 0.3 %
LPL SERPL-CCNC: 23 U/L
LYMPHOCYTES # BLD AUTO: 1.25 K/UL
LYMPHOCYTES NFR BLD AUTO: 19.8 %
MAN DIFF?: NORMAL
MCHC RBC-ENTMCNC: 26 PG
MCHC RBC-ENTMCNC: 31.9 G/DL
MCV RBC AUTO: 81.6 FL
MONOCYTES # BLD AUTO: 0.54 K/UL
MONOCYTES NFR BLD AUTO: 8.5 %
NEUTROPHILS # BLD AUTO: 4.38 K/UL
NEUTROPHILS NFR BLD AUTO: 69.4 %
PLATELET # BLD AUTO: 216 K/UL
POTASSIUM SERPL-SCNC: 4.3 MMOL/L
PROT SERPL-MCNC: 6.9 G/DL
RBC # BLD: 4.88 M/UL
RBC # FLD: 14.2 %
SODIUM SERPL-SCNC: 141 MMOL/L
WBC # FLD AUTO: 6.32 K/UL

## 2024-12-22 LAB — BACTERIA STL CULT: NORMAL

## 2024-12-26 ENCOUNTER — APPOINTMENT (OUTPATIENT)
Dept: ELECTROPHYSIOLOGY | Facility: CLINIC | Age: 84
End: 2024-12-26
Payer: MEDICARE

## 2024-12-26 ENCOUNTER — NON-APPOINTMENT (OUTPATIENT)
Age: 84
End: 2024-12-26

## 2024-12-26 PROCEDURE — 93296 REM INTERROG EVL PM/IDS: CPT

## 2024-12-26 PROCEDURE — 93294 REM INTERROG EVL PM/LDLS PM: CPT

## 2024-12-27 ENCOUNTER — APPOINTMENT (OUTPATIENT)
Dept: UROGYNECOLOGY | Facility: CLINIC | Age: 84
End: 2024-12-27

## 2025-01-03 ENCOUNTER — APPOINTMENT (OUTPATIENT)
Dept: UROGYNECOLOGY | Facility: CLINIC | Age: 85
End: 2025-01-03

## 2025-01-06 ENCOUNTER — EMERGENCY (EMERGENCY)
Facility: HOSPITAL | Age: 85
LOS: 1 days | Discharge: HOME | End: 2025-01-06
Attending: STUDENT IN AN ORGANIZED HEALTH CARE EDUCATION/TRAINING PROGRAM
Payer: MEDICARE

## 2025-01-06 VITALS
OXYGEN SATURATION: 97 % | TEMPERATURE: 98 F | RESPIRATION RATE: 18 BRPM | DIASTOLIC BLOOD PRESSURE: 73 MMHG | HEART RATE: 76 BPM | SYSTOLIC BLOOD PRESSURE: 175 MMHG

## 2025-01-06 VITALS
WEIGHT: 148.37 LBS | HEART RATE: 98 BPM | OXYGEN SATURATION: 95 % | RESPIRATION RATE: 18 BRPM | DIASTOLIC BLOOD PRESSURE: 76 MMHG | TEMPERATURE: 98 F | SYSTOLIC BLOOD PRESSURE: 105 MMHG

## 2025-01-06 DIAGNOSIS — Z98.89 OTHER SPECIFIED POSTPROCEDURAL STATES: Chronic | ICD-10-CM

## 2025-01-06 DIAGNOSIS — Z98.49 CATARACT EXTRACTION STATUS, UNSPECIFIED EYE: Chronic | ICD-10-CM

## 2025-01-06 PROCEDURE — 99284 EMERGENCY DEPT VISIT MOD MDM: CPT | Mod: 25

## 2025-01-06 PROCEDURE — 70450 CT HEAD/BRAIN W/O DYE: CPT | Mod: MC

## 2025-01-06 PROCEDURE — 70450 CT HEAD/BRAIN W/O DYE: CPT | Mod: 26

## 2025-01-06 PROCEDURE — 99284 EMERGENCY DEPT VISIT MOD MDM: CPT

## 2025-01-06 RX ORDER — ACETAMINOPHEN 80 MG/.8ML
975 SOLUTION/ DROPS ORAL ONCE
Refills: 0 | Status: COMPLETED | OUTPATIENT
Start: 2025-01-06 | End: 2025-01-06

## 2025-01-06 RX ADMIN — ACETAMINOPHEN 975 MILLIGRAM(S): 80 SOLUTION/ DROPS ORAL at 05:48

## 2025-01-06 NOTE — ED ADULT TRIAGE NOTE - BEFAST ARM SIDE DRIFT
Medication(s) premarin refill refused due to DUPLICATE was filled 8/8/2024 for 1-year to the same pharmacy.       No

## 2025-01-06 NOTE — ED PROVIDER NOTE - NSFOLLOWUPINSTRUCTIONS_ED_ALL_ED_FT
1) Follow up with your doctor in 1-2 days  2) Return to the ER for worsening or concerning symptoms    General Headache Without Cause    A headache is pain or discomfort felt around the head or neck area. The specific cause of a headache may not be found. There are many causes and types of headaches. A few common ones are:    Tension headaches.  Migraine headaches.  Cluster headaches.  Chronic daily headaches.    Follow these instructions at home:  Watch your condition for any changes. Let your health care provider know about them. Take these steps to help with your condition:        Managing pain      Take over-the-counter and prescription medicines only as told by your health care provider.  Lie down in a dark, quiet room when you have a headache.  If directed, put ice on your head and neck area:    Put ice in a plastic bag.  Place a towel between your skin and the bag.  Leave the ice on for 20 minutes, 2–3 times per day.  If directed, apply heat to the affected area. Use the heat source that your health care provider recommends, such as a moist heat pack or a heating pad.    Place a towel between your skin and the heat source.  Leave the heat on for 20–30 minutes.  Remove the heat if your skin turns bright red. This is especially important if you are unable to feel pain, heat, or cold. You may have a greater risk of getting burned.  Keep lights dim if bright lights bother you or make your headaches worse.        Eating and drinking    Eat meals on a regular schedule.  If you drink alcohol:    Limit how much you use to:     0–1 drink a day for women.   0–2 drinks a day for men.   Be aware of how much alcohol is in your drink. In the [place] one drink equals one 12 oz bottle of beer (355 mL), one 5 oz glass of wine (148 mL), or one 1½ oz glass of hard liquor (44 mL).  Stop drinking caffeine, or decrease the amount of caffeine you drink.        General instructions     Keep a headache journal to help find out what may trigger your headaches. For example, write down:    What you eat and drink.  How much sleep you get.  Any change to your diet or medicines.  Try massage or other relaxation techniques.  Limit stress.  Sit up straight, and do not tense your muscles.  Do not use any products that contain nicotine or tobacco, such as cigarettes, e-cigarettes, and chewing tobacco. If you need help quitting, ask your health care provider.  Exercise regularly as told by your health care provider.  Sleep on a regular schedule. Get 7–9 hours of sleep each night, or the amount recommended by your health care provider.  Keep all follow-up visits as told by your health care provider. This is important.    Contact a health care provider if:  Your symptoms are not helped by medicine.  You have a headache that is different from the usual headache.  You have nausea or you vomit.  You have a fever.    Get help right away if:  Your headache becomes severe quickly.  Your headache gets worse after moderate to intense physical activity.  You have repeated vomiting.  You have a stiff neck.  You have a loss of vision.  You have problems with speech.  You have pain in the eye or ear.  You have muscular weakness or loss of muscle control.  You lose your balance or have trouble walking.  You feel faint or pass out.  You have confusion.  You have a seizure.    Summary  A headache is pain or discomfort felt around the head or neck area.  There are many causes and types of headaches. In some cases, the cause may not be found.  Keep a headache journal to help find out what may trigger your headaches. Watch your condition for any changes. Let your health care provider know about them.  Contact a health care provider if you have a headache that is different from the usual headache, or if your symptoms are not helped by medicine.  Get help right away if your headache becomes severe, you vomit, you have a loss of vision, you lose your balance, or you have a seizure.    ADDITIONAL NOTES AND INSTRUCTIONS    Please follow up with your Primary MD in 24-48 hr.  Seek immediate medical care for any new/worsening signs or symptoms.

## 2025-01-06 NOTE — ED PROVIDER NOTE - CLINICAL SUMMARY MEDICAL DECISION MAKING FREE TEXT BOX
84-year-old female with history of cataract resection and intermittent vertigo presents for evaluation of left-sided headache that been going on for the past 3 days.  Patient states initially started out as a tingling sensation along her scalp with intermittent pains.  Patient states the symptoms persisted until today prompting presentation to the ED.  Patient states she is taken no medications to aid with her symptoms over the last 3 days.  Patient denies focal neurologic deficit, nausea, vomiting, fever, chills, headache, rash, trauma, history of recurrent headaches.  Patient's examination is normal at this time.  Patient is feeling better and CT scan is also noted to be negative.   at bedside states there are no additional symptoms that he is noted in the patient's recent  history.   medication offered and patient only agreeable o acetaminophen due to reported history of multiple allergies and being very sensitive to medications.

## 2025-01-06 NOTE — ED PROVIDER NOTE - NEUROLOGICAL SENSATION
Pt is here for evaluation of rectal bleeding that started on Saturday. She was by Dr. Ortez today and they had issues getting blood and he sent her here.   
present and normal in 4 extremities

## 2025-01-06 NOTE — ED ADULT NURSE NOTE - OBJECTIVE STATEMENT
pt c/o headache x3 days. pt has hx of pacemaker and HTN. denies any blurry vision. N/V/D, dizziness, trauma. pt given PO meds as per MD orders. pt states has dave. schedule with PCP for same symptoms later today. pt safety maintained.

## 2025-01-06 NOTE — ED PROVIDER NOTE - PATIENT PORTAL LINK FT
You can access the FollowMyHealth Patient Portal offered by [facility] by registering at the following website: [email or web address]. By joining [employer]’s FollowMyHealth portal, you will also be able to view your health information using other applications (apps) compatible with our system.

## 2025-01-06 NOTE — ED PROVIDER NOTE - CRANIAL NERVE AND PUPILLARY EXAM
09/23/24 1209   OTHER   Discipline physical therapist   Rehab Time/Intention   Session Not Performed other (see comments)  (Per nursing hold as pt has been very lethargic this AM.  PT will f/u 9/24.)   Therapy Assessment/Plan (PT)   Criteria for Skilled Interventions Met (PT) yes;meets criteria   Recommendation   PT - Next Appointment 09/24/24        cranial nerves 2-12 intact

## 2025-01-06 NOTE — ED ADULT TRIAGE NOTE - BEFAST SPEECH PHRASE
Yes

## 2025-01-09 ENCOUNTER — APPOINTMENT (OUTPATIENT)
Dept: FAMILY MEDICINE | Facility: CLINIC | Age: 85
End: 2025-01-09
Payer: MEDICARE

## 2025-01-09 VITALS
SYSTOLIC BLOOD PRESSURE: 120 MMHG | OXYGEN SATURATION: 97 % | DIASTOLIC BLOOD PRESSURE: 68 MMHG | WEIGHT: 140 LBS | HEART RATE: 79 BPM | TEMPERATURE: 97 F | HEIGHT: 63 IN | BODY MASS INDEX: 24.8 KG/M2

## 2025-01-09 VITALS — DIASTOLIC BLOOD PRESSURE: 84 MMHG | SYSTOLIC BLOOD PRESSURE: 136 MMHG

## 2025-01-09 DIAGNOSIS — I10 ESSENTIAL (PRIMARY) HYPERTENSION: ICD-10-CM

## 2025-01-09 DIAGNOSIS — L29.9 PRURITUS, UNSPECIFIED: ICD-10-CM

## 2025-01-09 PROCEDURE — 99213 OFFICE O/P EST LOW 20 MIN: CPT

## 2025-01-09 PROCEDURE — 36415 COLL VENOUS BLD VENIPUNCTURE: CPT

## 2025-01-10 ENCOUNTER — APPOINTMENT (OUTPATIENT)
Dept: UROGYNECOLOGY | Facility: CLINIC | Age: 85
End: 2025-01-10

## 2025-01-10 ENCOUNTER — NON-APPOINTMENT (OUTPATIENT)
Age: 85
End: 2025-01-10

## 2025-01-10 DIAGNOSIS — Z88.5 ALLERGY STATUS TO NARCOTIC AGENT: ICD-10-CM

## 2025-01-10 DIAGNOSIS — Z88.1 ALLERGY STATUS TO OTHER ANTIBIOTIC AGENTS: ICD-10-CM

## 2025-01-10 DIAGNOSIS — R51.9 HEADACHE, UNSPECIFIED: ICD-10-CM

## 2025-01-10 DIAGNOSIS — Z88.8 ALLERGY STATUS TO OTHER DRUGS, MEDICAMENTS AND BIOLOGICAL SUBSTANCES: ICD-10-CM

## 2025-01-12 LAB
ALBUMIN SERPL ELPH-MCNC: 4.3 G/DL
ALP BLD-CCNC: 82 U/L
ALT SERPL-CCNC: 6 U/L
ANION GAP SERPL CALC-SCNC: 11 MMOL/L
AST SERPL-CCNC: 14 U/L
BASOPHILS # BLD AUTO: 0.04 K/UL
BASOPHILS NFR BLD AUTO: 0.5 %
BILIRUB SERPL-MCNC: 0.3 MG/DL
BUN SERPL-MCNC: 22 MG/DL
CALCIUM SERPL-MCNC: 9.6 MG/DL
CHLORIDE SERPL-SCNC: 101 MMOL/L
CO2 SERPL-SCNC: 26 MMOL/L
CREAT SERPL-MCNC: 0.79 MG/DL
EGFR: 73 ML/MIN/1.73M2
EOSINOPHIL # BLD AUTO: 0.28 K/UL
EOSINOPHIL NFR BLD AUTO: 3.5 %
GLUCOSE SERPL-MCNC: 94 MG/DL
HCT VFR BLD CALC: 42.5 %
HGB BLD-MCNC: 13.4 G/DL
IMM GRANULOCYTES NFR BLD AUTO: 0.2 %
LYMPHOCYTES # BLD AUTO: 1.92 K/UL
LYMPHOCYTES NFR BLD AUTO: 23.7 %
MAN DIFF?: NORMAL
MCHC RBC-ENTMCNC: 25.8 PG
MCHC RBC-ENTMCNC: 31.5 G/DL
MCV RBC AUTO: 81.9 FL
MONOCYTES # BLD AUTO: 0.81 K/UL
MONOCYTES NFR BLD AUTO: 10 %
NEUTROPHILS # BLD AUTO: 5.03 K/UL
NEUTROPHILS NFR BLD AUTO: 62.1 %
PLATELET # BLD AUTO: 240 K/UL
POTASSIUM SERPL-SCNC: 5 MMOL/L
PROT SERPL-MCNC: 7.3 G/DL
RBC # BLD: 5.19 M/UL
RBC # FLD: 14.5 %
SODIUM SERPL-SCNC: 138 MMOL/L
WBC # FLD AUTO: 8.1 K/UL

## 2025-01-13 LAB — HISTAWAB: 742 NMOL/L

## 2025-01-16 ENCOUNTER — APPOINTMENT (OUTPATIENT)
Dept: FAMILY MEDICINE | Facility: CLINIC | Age: 85
End: 2025-01-16

## 2025-01-27 ENCOUNTER — NON-APPOINTMENT (OUTPATIENT)
Age: 85
End: 2025-01-27

## 2025-01-28 ENCOUNTER — APPOINTMENT (OUTPATIENT)
Dept: ELECTROPHYSIOLOGY | Facility: CLINIC | Age: 85
End: 2025-01-28
Payer: MEDICARE

## 2025-01-28 ENCOUNTER — NON-APPOINTMENT (OUTPATIENT)
Age: 85
End: 2025-01-28

## 2025-01-28 VITALS
WEIGHT: 140 LBS | BODY MASS INDEX: 24.8 KG/M2 | HEART RATE: 75 BPM | HEIGHT: 63 IN | OXYGEN SATURATION: 97 % | DIASTOLIC BLOOD PRESSURE: 67 MMHG | SYSTOLIC BLOOD PRESSURE: 189 MMHG

## 2025-01-28 DIAGNOSIS — I44.2 ATRIOVENTRICULAR BLOCK, COMPLETE: ICD-10-CM

## 2025-01-28 DIAGNOSIS — I45.10 UNSPECIFIED RIGHT BUNDLE-BRANCH BLOCK: ICD-10-CM

## 2025-01-28 DIAGNOSIS — Z95.0 PRESENCE OF CARDIAC PACEMAKER: ICD-10-CM

## 2025-01-28 DIAGNOSIS — R35.1 NOCTURIA: ICD-10-CM

## 2025-01-28 PROCEDURE — 99214 OFFICE O/P EST MOD 30 MIN: CPT

## 2025-01-28 PROCEDURE — 93000 ELECTROCARDIOGRAM COMPLETE: CPT

## 2025-02-03 ENCOUNTER — APPOINTMENT (OUTPATIENT)
Dept: UROLOGY | Facility: CLINIC | Age: 85
End: 2025-02-03
Payer: MEDICARE

## 2025-02-03 DIAGNOSIS — L29.9 PRURITUS, UNSPECIFIED: ICD-10-CM

## 2025-02-03 PROCEDURE — 99214 OFFICE O/P EST MOD 30 MIN: CPT

## 2025-02-07 LAB — BACTERIA UR CULT: ABNORMAL

## 2025-02-11 ENCOUNTER — NON-APPOINTMENT (OUTPATIENT)
Age: 85
End: 2025-02-11

## 2025-02-11 ENCOUNTER — APPOINTMENT (OUTPATIENT)
Dept: INFECTIOUS DISEASE | Facility: CLINIC | Age: 85
End: 2025-02-11
Payer: MEDICARE

## 2025-02-11 VITALS
HEIGHT: 63 IN | BODY MASS INDEX: 24.8 KG/M2 | DIASTOLIC BLOOD PRESSURE: 75 MMHG | SYSTOLIC BLOOD PRESSURE: 155 MMHG | OXYGEN SATURATION: 97 % | HEART RATE: 78 BPM | WEIGHT: 140 LBS

## 2025-02-11 DIAGNOSIS — N39.0 URINARY TRACT INFECTION, SITE NOT SPECIFIED: ICD-10-CM

## 2025-02-11 DIAGNOSIS — Z88.9 ALLERGY STATUS TO UNSPECIFIED DRUGS, MEDICAMENTS AND BIOLOGICAL SUBSTANCES: ICD-10-CM

## 2025-02-11 DIAGNOSIS — B95.2 URINARY TRACT INFECTION, SITE NOT SPECIFIED: ICD-10-CM

## 2025-02-11 DIAGNOSIS — Z03.818 ENCOUNTER FOR OBSERVATION FOR SUSPECTED EXPOSURE TO OTHER BIOLOGICAL AGENTS RULED OUT: ICD-10-CM

## 2025-02-11 DIAGNOSIS — R82.71 BACTERIURIA: ICD-10-CM

## 2025-02-11 PROCEDURE — 99215 OFFICE O/P EST HI 40 MIN: CPT

## 2025-02-11 RX ORDER — PENICILLIN V POTASSIUM 500 MG/1
500 TABLET, FILM COATED ORAL TWICE DAILY
Qty: 14 | Refills: 0 | Status: COMPLETED | COMMUNITY
Start: 2025-02-07 | End: 2025-02-11

## 2025-02-21 LAB
APPEARANCE: CLEAR
BACTERIA UR CULT: NORMAL
BACTERIA: NEGATIVE /HPF
BILIRUBIN URINE: NEGATIVE
BLOOD URINE: NEGATIVE
CAST: 0 /LPF
COLOR: YELLOW
EPITHELIAL CELLS: 1 /HPF
GLUCOSE QUALITATIVE U: NEGATIVE MG/DL
KETONES URINE: ABNORMAL MG/DL
LEUKOCYTE ESTERASE URINE: NEGATIVE
MICROSCOPIC-UA: NORMAL
NITRITE URINE: NEGATIVE
PH URINE: 5
PROTEIN URINE: NORMAL MG/DL
RED BLOOD CELLS URINE: 5 /HPF
REVIEW: NORMAL
SPECIFIC GRAVITY URINE: 1.02
UROBILINOGEN URINE: 1 MG/DL
WHITE BLOOD CELLS URINE: 1 /HPF

## 2025-02-24 ENCOUNTER — APPOINTMENT (OUTPATIENT)
Dept: UROLOGY | Facility: CLINIC | Age: 85
End: 2025-02-24

## 2025-02-24 VITALS
BODY MASS INDEX: 24.8 KG/M2 | HEART RATE: 71 BPM | DIASTOLIC BLOOD PRESSURE: 76 MMHG | SYSTOLIC BLOOD PRESSURE: 157 MMHG | WEIGHT: 140 LBS | HEIGHT: 63 IN

## 2025-02-26 ENCOUNTER — NON-APPOINTMENT (OUTPATIENT)
Age: 85
End: 2025-02-26

## 2025-02-26 ENCOUNTER — APPOINTMENT (OUTPATIENT)
Dept: CARDIOLOGY | Facility: CLINIC | Age: 85
End: 2025-02-26
Payer: MEDICARE

## 2025-02-26 VITALS
OXYGEN SATURATION: 98 % | HEART RATE: 76 BPM | DIASTOLIC BLOOD PRESSURE: 72 MMHG | HEIGHT: 63 IN | SYSTOLIC BLOOD PRESSURE: 142 MMHG | BODY MASS INDEX: 24.8 KG/M2 | WEIGHT: 140 LBS

## 2025-02-26 DIAGNOSIS — I45.10 UNSPECIFIED RIGHT BUNDLE-BRANCH BLOCK: ICD-10-CM

## 2025-02-26 DIAGNOSIS — I10 ESSENTIAL (PRIMARY) HYPERTENSION: ICD-10-CM

## 2025-02-26 DIAGNOSIS — R94.31 ABNORMAL ELECTROCARDIOGRAM [ECG] [EKG]: ICD-10-CM

## 2025-02-26 DIAGNOSIS — Z95.0 PRESENCE OF CARDIAC PACEMAKER: ICD-10-CM

## 2025-02-26 PROCEDURE — 93000 ELECTROCARDIOGRAM COMPLETE: CPT

## 2025-02-26 PROCEDURE — 99214 OFFICE O/P EST MOD 30 MIN: CPT | Mod: 25

## 2025-03-05 ENCOUNTER — OUTPATIENT (OUTPATIENT)
Dept: OUTPATIENT SERVICES | Facility: HOSPITAL | Age: 85
LOS: 1 days | End: 2025-03-05
Payer: MEDICARE

## 2025-03-05 VITALS
WEIGHT: 138.89 LBS | SYSTOLIC BLOOD PRESSURE: 118 MMHG | RESPIRATION RATE: 18 BRPM | DIASTOLIC BLOOD PRESSURE: 84 MMHG | HEIGHT: 63 IN | TEMPERATURE: 98 F | HEART RATE: 99 BPM | OXYGEN SATURATION: 98 %

## 2025-03-05 DIAGNOSIS — Z98.89 OTHER SPECIFIED POSTPROCEDURAL STATES: Chronic | ICD-10-CM

## 2025-03-05 DIAGNOSIS — Z98.49 CATARACT EXTRACTION STATUS, UNSPECIFIED EYE: Chronic | ICD-10-CM

## 2025-03-05 DIAGNOSIS — M17.11 UNILATERAL PRIMARY OSTEOARTHRITIS, RIGHT KNEE: ICD-10-CM

## 2025-03-05 DIAGNOSIS — Z01.818 ENCOUNTER FOR OTHER PREPROCEDURAL EXAMINATION: ICD-10-CM

## 2025-03-05 DIAGNOSIS — Z47.1 AFTERCARE FOLLOWING JOINT REPLACEMENT SURGERY: Chronic | ICD-10-CM

## 2025-03-05 DIAGNOSIS — Z98.890 OTHER SPECIFIED POSTPROCEDURAL STATES: Chronic | ICD-10-CM

## 2025-03-05 DIAGNOSIS — Z95.0 PRESENCE OF CARDIAC PACEMAKER: Chronic | ICD-10-CM

## 2025-03-05 LAB
A1C WITH ESTIMATED AVERAGE GLUCOSE RESULT: 5.5 % — SIGNIFICANT CHANGE UP (ref 4–5.6)
ALBUMIN SERPL ELPH-MCNC: 4.1 G/DL — SIGNIFICANT CHANGE UP (ref 3.3–5.2)
ALP SERPL-CCNC: 74 U/L — SIGNIFICANT CHANGE UP (ref 40–120)
ALT FLD-CCNC: 5 U/L — SIGNIFICANT CHANGE UP
ANION GAP SERPL CALC-SCNC: 11 MMOL/L — SIGNIFICANT CHANGE UP (ref 5–17)
APTT BLD: 31.5 SEC — SIGNIFICANT CHANGE UP (ref 24.5–35.6)
AST SERPL-CCNC: 14 U/L — SIGNIFICANT CHANGE UP
BASOPHILS # BLD AUTO: 0.03 K/UL — SIGNIFICANT CHANGE UP (ref 0–0.2)
BASOPHILS NFR BLD AUTO: 0.5 % — SIGNIFICANT CHANGE UP (ref 0–2)
BILIRUB SERPL-MCNC: 0.4 MG/DL — SIGNIFICANT CHANGE UP (ref 0.4–2)
BLD GP AB SCN SERPL QL: SIGNIFICANT CHANGE UP
BUN SERPL-MCNC: 17.3 MG/DL — SIGNIFICANT CHANGE UP (ref 8–20)
CALCIUM SERPL-MCNC: 9.1 MG/DL — SIGNIFICANT CHANGE UP (ref 8.4–10.5)
CHLORIDE SERPL-SCNC: 101 MMOL/L — SIGNIFICANT CHANGE UP (ref 96–108)
CO2 SERPL-SCNC: 26 MMOL/L — SIGNIFICANT CHANGE UP (ref 22–29)
COMMENT - BLOOD BANK: SIGNIFICANT CHANGE UP
CREAT SERPL-MCNC: 0.67 MG/DL — SIGNIFICANT CHANGE UP (ref 0.5–1.3)
EGFR: 86 ML/MIN/1.73M2 — SIGNIFICANT CHANGE UP
EGFR: 86 ML/MIN/1.73M2 — SIGNIFICANT CHANGE UP
EOSINOPHIL # BLD AUTO: 0.21 K/UL — SIGNIFICANT CHANGE UP (ref 0–0.5)
EOSINOPHIL NFR BLD AUTO: 3.5 % — SIGNIFICANT CHANGE UP (ref 0–6)
ESTIMATED AVERAGE GLUCOSE: 111 MG/DL — SIGNIFICANT CHANGE UP (ref 68–114)
GLUCOSE SERPL-MCNC: 93 MG/DL — SIGNIFICANT CHANGE UP (ref 70–99)
HCT VFR BLD CALC: 43.2 % — SIGNIFICANT CHANGE UP (ref 34.5–45)
HGB BLD-MCNC: 13.3 G/DL — SIGNIFICANT CHANGE UP (ref 11.5–15.5)
IMM GRANULOCYTES # BLD AUTO: 0.01 K/UL — SIGNIFICANT CHANGE UP (ref 0–0.07)
IMM GRANULOCYTES NFR BLD AUTO: 0.2 % — SIGNIFICANT CHANGE UP (ref 0–0.9)
INR BLD: 1 RATIO — SIGNIFICANT CHANGE UP (ref 0.85–1.16)
LYMPHOCYTES # BLD AUTO: 1.45 K/UL — SIGNIFICANT CHANGE UP (ref 1–3.3)
LYMPHOCYTES NFR BLD AUTO: 24.3 % — SIGNIFICANT CHANGE UP (ref 13–44)
MCHC RBC-ENTMCNC: 25.3 PG — LOW (ref 27–34)
MCHC RBC-ENTMCNC: 30.8 G/DL — LOW (ref 32–36)
MCV RBC AUTO: 82.3 FL — SIGNIFICANT CHANGE UP (ref 80–100)
MONOCYTES # BLD AUTO: 0.49 K/UL — SIGNIFICANT CHANGE UP (ref 0–0.9)
MONOCYTES NFR BLD AUTO: 8.2 % — SIGNIFICANT CHANGE UP (ref 2–14)
MRSA PCR RESULT.: SIGNIFICANT CHANGE UP
NEUTROPHILS # BLD AUTO: 3.78 K/UL — SIGNIFICANT CHANGE UP (ref 1.8–7.4)
NEUTROPHILS NFR BLD AUTO: 63.3 % — SIGNIFICANT CHANGE UP (ref 43–77)
NRBC # BLD AUTO: 0 K/UL — SIGNIFICANT CHANGE UP (ref 0–0)
NRBC # FLD: 0 K/UL — SIGNIFICANT CHANGE UP (ref 0–0)
NRBC BLD AUTO-RTO: 0 /100 WBCS — SIGNIFICANT CHANGE UP (ref 0–0)
PLATELET # BLD AUTO: 216 K/UL — SIGNIFICANT CHANGE UP (ref 150–400)
PMV BLD: 12.1 FL — SIGNIFICANT CHANGE UP (ref 7–13)
POTASSIUM SERPL-MCNC: 4.4 MMOL/L — SIGNIFICANT CHANGE UP (ref 3.5–5.3)
POTASSIUM SERPL-SCNC: 4.4 MMOL/L — SIGNIFICANT CHANGE UP (ref 3.5–5.3)
PROT SERPL-MCNC: 7.4 G/DL — SIGNIFICANT CHANGE UP (ref 6.6–8.7)
PROTHROM AB SERPL-ACNC: 11.6 SEC — SIGNIFICANT CHANGE UP (ref 9.9–13.4)
RBC # BLD: 5.25 M/UL — HIGH (ref 3.8–5.2)
RBC # FLD: 14.8 % — HIGH (ref 10.3–14.5)
S AUREUS DNA NOSE QL NAA+PROBE: SIGNIFICANT CHANGE UP
SODIUM SERPL-SCNC: 138 MMOL/L — SIGNIFICANT CHANGE UP (ref 135–145)
WBC # BLD: 5.97 K/UL — SIGNIFICANT CHANGE UP (ref 3.8–10.5)
WBC # FLD AUTO: 5.97 K/UL — SIGNIFICANT CHANGE UP (ref 3.8–10.5)

## 2025-03-05 PROCEDURE — 87640 STAPH A DNA AMP PROBE: CPT

## 2025-03-05 PROCEDURE — 86850 RBC ANTIBODY SCREEN: CPT

## 2025-03-05 PROCEDURE — G0463: CPT

## 2025-03-05 PROCEDURE — 87641 MR-STAPH DNA AMP PROBE: CPT

## 2025-03-05 PROCEDURE — 85730 THROMBOPLASTIN TIME PARTIAL: CPT

## 2025-03-05 PROCEDURE — 85025 COMPLETE CBC W/AUTO DIFF WBC: CPT

## 2025-03-05 PROCEDURE — 86900 BLOOD TYPING SEROLOGIC ABO: CPT

## 2025-03-05 PROCEDURE — 83036 HEMOGLOBIN GLYCOSYLATED A1C: CPT

## 2025-03-05 PROCEDURE — 85610 PROTHROMBIN TIME: CPT

## 2025-03-05 PROCEDURE — 86901 BLOOD TYPING SEROLOGIC RH(D): CPT

## 2025-03-05 PROCEDURE — 80053 COMPREHEN METABOLIC PANEL: CPT

## 2025-03-05 PROCEDURE — 36415 COLL VENOUS BLD VENIPUNCTURE: CPT

## 2025-03-05 NOTE — H&P PST ADULT - OTHER CARE PROVIDERS
Dr. Ida VALENTIN, allergist Dr. Mitchell Dr. Norma VALENTIN, allergist Dr. Mitchell, Cardiology Dr. Simpson [phone], PCP Dr. Brower [phone]

## 2025-03-05 NOTE — H&P PST ADULT - ASSESSMENT
Pt. educated and instructed regarding all preoperative instructions and education as per policy via both verbal and written means of communication and pt. verbalized agreement and understanding.  Patient educated on surgical scrub, preadmission instructions, medical optimization and day of procedure medications, pt. verbalizes understanding and agreement.  Pt instructed to stop vitamins/supplements/herbal medications/NSAIDS for one week prior to surgery and pt. verbalized agreement and understanding.  Pt. to have medical optimization with  Dr. Sanchez, states she was already seen, pending receipt and pt. verbalized agreement and understanding.  Pt. to have cardiac optimization with  states she was already seen, pending receipt  and pt. verbalized agreement and understanding.  Patient was given information on joint class, joint book provided, ERP protocol reviewed with patient, MSSA/MRSA swabbed in PST, results pending and pt. verbalized agreement and understanding.     83 y/o female presents today to PST pending right TKR on 3/27 (date changed from 3/25) as per pt. with Dr. Suresh Robertson secondary to unilateral primary OA of right knee.  States she has had right knee pain for a few years, states she can hardly walk recently, states in the past she was working and was unable to work but now is retired and is able to address this issue. States she has constant right knee pain, described as ache and shooting, states her knee rona at times, denies recent falls, rated 10/10, states she has knee pain that is confined, it is in the back of her knee and her knee joint is swollen, denies numbness in leg, denies cane/walker use, states she had injections in her knee which used to help the last one 6 months ago did not help her, pain is relieved with rest, pain worse with walking. States she got a ppm last year due to a low HR.  States she is following with an allergist for history of medication reactions. History of HTN. States she is on no present medications under the direction of allergist with providers awareness due to history of allergic reactions.  Pt. wears knee brace of right knee states it helps pain. BMI 24.6.     Pt. educated and instructed regarding all preoperative instructions and education as per policy via both verbal and written means of communication and pt. verbalized agreement and understanding.  Patient educated on surgical scrub, preadmission instructions, medical optimization and day of procedure medications, pt. verbalizes understanding and agreement.  Pt instructed to stop vitamins/supplements/herbal medications/NSAIDS for one week prior to surgery and pt. verbalized agreement and understanding.  Pt. to have medical optimization with  Dr. Sanchez, states she was already seen, pending receipt and pt. verbalized agreement and understanding.  Pt. to have cardiac optimization with  Dr. Simpson, states she was already seen, pending receipt  and pt. verbalized agreement and understanding.  Patient was given information on joint class, joint book provided, ERP protocol reviewed with patient, MSSA/MRSA swabbed in PST, results pending and pt. verbalized agreement and understanding.  Request made for pt. to send copy of ppm card to PST, pst contact provided to pt., and pt. verbalized agreement and understanding.     OPIOID RISK TOOL    EMELYN EACH BOX THAT APPLIES AND ADD TOTALS AT THE END    FAMILY HISTORY OF SUBSTANCE ABUSE                 FEMALE         MALE                                                Alcohol                             [  ]1 pt          [  ]3pts                                               Illegal Durgs                     [  ]2 pts        [  ]3pts                                               Rx Drugs                           [  ]4 pts        [  ]4 pts    PERSONAL HISTORY OF SUBSTANCE ABUSE                                                                                          Alcohol                             [  ]3 pts       [  ]3 pts                                               Illegal Drugs                     [  ]4 pts        [  ]4 pts                                               Rx Drugs                           [  ]5 pts        [  ]5 pts    AGE BETWEEN 16-45 YEARS                                      [  ]1 pt         [  ]1 pt    HISTORY OF PREADOLESCENT   SEXUAL ABUSE                                                             [  ]3 pts        [  ]0pts    PSYCHOLOGICAL DISEASE                     ADD, OCD, Bipolar, Schizophrenia        [  ]2 pts         [  ]2 pts                      Depression                                               [  ]1 pt           [  ]1 pt           SCORING TOTAL   (add numbers and type here)              (0)                                     A score of 3 or lower indicated LOW risk for future opioid abuse  A score of 4 to 7 indicated moderate risk for future opioid abuse  A score of 8 or higher indicates a high risk for opioid abuse    CAPRINI SCORE    AGE RELATED RISK FACTORS                                                             [ ] Age 41-60 years                                            (1 Point)  [ ] Age: 61-74 years                                           (2 Points)                 [x ] Age= 75 years                                                (3 Points)             DISEASE RELATED RISK FACTORS                                                       [ ] Edema in the lower extremities                 (1 Point)                     [ ] Varicose veins                                               (1 Point)                                 [ ] BMI > 25 Kg/m2                                            (1 Point)                                  [ ] Serious infection (ie PNA)                            (1 Point)                     [ ] Lung disease ( COPD, Emphysema)            (1 Point)                                                                          [ ] Acute myocardial infarction                         (1 Point)                  [ ] Congestive heart failure (in the previous month)  (1 Point)         [ ] Inflammatory bowel disease                            (1 Point)                  [ ] Central venous access, PICC or Port               (2 points)       (within the last month)                                                                [ ] Stroke (in the previous month)                        (5 Points)    [ ] Previous or present malignancy                       (2 points)                                                                                                                                                         HEMATOLOGY RELATED FACTORS                                                         [ ] Prior episodes of VTE                                     (3 Points)                     [ ] Positive family history for VTE                      (3 Points)                  [ ] Prothrombin 12461 A                                     (3 Points)                     [ ] Factor V Leiden                                                (3 Points)                        [ ] Lupus anticoagulants                                      (3 Points)                                                           [ ] Anticardiolipin antibodies                              (3 Points)                                                       [ ] High homocysteine in the blood                   (3 Points)                                             [ ] Other congenital or acquired thrombophilia      (3 Points)                                                [ ] Heparin induced thrombocytopenia                  (3 Points)                                        MOBILITY RELATED FACTORS  [ ] Bed rest                                                         (1 Point)  [ ] Plaster cast                                                    (2 points)  [ ] Bed bound for more than 72 hours           (2 Points)    GENDER SPECIFIC FACTORS  [ ] Pregnancy or had a baby within the last month   (1 Point)  [ ] Post-partum < 6 weeks                                   (1 Point)  [ ] Hormonal therapy  or oral contraception   (1 Point)  [ ] History of pregnancy complications              (1 point)  [ ] Unexplained or recurrent              (1 Point)    OTHER RISK FACTORS                                           (1 Point)  [ ] BMI >40, smoking, diabetes requiring insulin, chemotherapy  blood transfusions and length of surgery over 2 hours    SURGERY RELATED RISK FACTORS  [ ]  Section within the last month     (1 Point)  [ ] Minor surgery                                                  (1 Point)  [ ] Arthroscopic surgery                                       (2 Points)  [ ] Planned major surgery lasting more            (2 Points)      than 45 minutes     [x ] Elective hip or knee joint replacement       (5 points)       surgery                                                TRAUMA RELATED RISK FACTORS  [ ] Fracture of the hip, pelvis, or leg                       (5 Points)  [ ] Spinal cord injury resulting in paralysis             (5 points)       (in the previous month)    [ ] Paralysis  (less than 1 month)                             (5 Points)  [ ] Multiple Trauma within 1 month                        (5 Points)    Total Score [    8    ]    Caprini Score 0-2: Low Risk, NO VTE prophylaxis required for most patients, encourage ambulation  Caprini Score 3-6: Moderate Risk , pharmacologic VTE prophylaxis is indicated for most patients (in the absence of contraindications)  Caprini Score Greater than or =7: High risk, pharmocologic VTE prophylaxis indicated for most patients (in the absence of contraindications)

## 2025-03-05 NOTE — H&P PST ADULT - PROBLEM SELECTOR PLAN 2
Medical and cardiac optimizations pending for right TKR on 3/27 (date changed from 3/25) as per pt. with Dr. Suresh Robertson secondary to unilateral primary OA of right knee.

## 2025-03-05 NOTE — H&P PST ADULT - NEGATIVE PSYCHIATRIC SYMPTOMS
no suicidal ideation/no depression/no insomnia/no memory loss/no mood swings/no agitation/no visual hallucinations/no auditory hallucinations/no hyperactivity All other review of systems negative, except as noted in HPI

## 2025-03-05 NOTE — H&P PST ADULT - MUSCULOSKELETAL
details… normal/ROM intact/no joint erythema/no joint warmth/no calf tenderness/decreased ROM/decreased ROM due to pain/joint swelling/normal gait/strength 5/5 bilateral upper extremities/strength 5/5 bilateral lower extremities

## 2025-03-05 NOTE — H&P PST ADULT - NSANTHOSAYNRD_GEN_A_CORE
No. NICHOLE screening performed.  STOP BANG Legend: 0-2 = LOW Risk; 3-4 = INTERMEDIATE Risk; 5-8 = HIGH Risk

## 2025-03-05 NOTE — H&P PST ADULT - NSHP PST DIAGOTHER LIST_GEN_A_CORE
3/5/25 15:45 All available labs and diagnostics noted as documented. All abnormal labs and diagnostics noted as documented and have been faxed to PCP with whom medical optimization is pending.  Spoke to lab T&S expires 3/26 AB screen neg. to date per lab, repeat T&S ordered for DOS will suffice. Arelis PRESCOTT, FNP-BC

## 2025-03-05 NOTE — H&P PST ADULT - NSICDXPASTMEDICALHX_GEN_ALL_CORE_FT
PAST MEDICAL HISTORY:  History of bradycardia     Hypertension     Phlebitis right leg  s/p surgery    Unilateral primary osteoarthritis, right knee     Vertigo

## 2025-03-05 NOTE — H&P PST ADULT - GASTROINTESTINAL
negative normal/soft/nontender/nondistended/normal active bowel sounds/no guarding/no rigidity/no organomegaly/no palpable pranav/no masses palpable

## 2025-03-05 NOTE — H&P PST ADULT - HISTORY OF PRESENT ILLNESS
States she has had right knee pain for a few years, states she can hardly walk recently, states in the past she was working and was unable to work but now is retired and is able to address this issue. States she has constant right knee pain, described as ache and shooting, states her knee rona at times, denies recent falls, rated 10/10, states she has knee pain that is confined, it is in the back of her knee and her knee joint is swollen, denies numbness in leg, denies cane/walker use, states she had injections in her knee which used to help the last one 6 months ago did not help her, pain is relieved with rest, pain worse with walking. States she got a ppm last year due to a low HR.  States she is following with an allergist for history of medication reactions.  85 y/o female presents today to PST pending right TKR on 3/27 (date changed from 3/25) as per pt. with Dr. Suresh Robertson secondary to unilateral primary OA of right knee.  States she has had right knee pain for a few years, states she can hardly walk recently, states in the past she was working and was unable to work but now is retired and is able to address this issue. States she has constant right knee pain, described as ache and shooting, states her knee rona at times, denies recent falls, rated 10/10, states she has knee pain that is confined, it is in the back of her knee and her knee joint is swollen, denies numbness in leg, denies cane/walker use, states she had injections in her knee which used to help the last one 6 months ago did not help her, pain is relieved with rest, pain worse with walking. States she got a ppm last year due to a low HR.  States she is following with an allergist for history of medication reactions. History of HTN. States she is on no present medications under the direction of allergist with providers awareness due to history of allergic reactions.

## 2025-03-05 NOTE — H&P PST ADULT - NSICDXPASTSURGICALHX_GEN_ALL_CORE_FT
PAST SURGICAL HISTORY:  Aftercare following right hip joint replacement surgery     History of  section     Pacemaker     Status post cataract extraction right eye    
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 chemo (last in 2006)

## 2025-03-14 ENCOUNTER — NON-APPOINTMENT (OUTPATIENT)
Age: 85
End: 2025-03-14

## 2025-03-17 ENCOUNTER — APPOINTMENT (OUTPATIENT)
Dept: ORTHOPEDIC SURGERY | Facility: CLINIC | Age: 85
End: 2025-03-17
Payer: MEDICARE

## 2025-03-17 DIAGNOSIS — I82.449 ACUTE EMBOLISM AND THROMBOSIS OF UNSPECIFIED TIBIAL VEIN: ICD-10-CM

## 2025-03-17 DIAGNOSIS — I82.409 ACUTE EMBOLISM AND THROMBOSIS OF UNSPECIFIED DEEP VEINS OF UNSPECIFIED LOWER EXTREMITY: ICD-10-CM

## 2025-03-17 DIAGNOSIS — I87.2 VENOUS INSUFFICIENCY (CHRONIC) (PERIPHERAL): ICD-10-CM

## 2025-03-17 DIAGNOSIS — M21.00 VALGUS DEFORMITY, NOT ELSEWHERE CLASSIFIED, UNSPECIFIED SITE: ICD-10-CM

## 2025-03-17 DIAGNOSIS — I44.2 ATRIOVENTRICULAR BLOCK, COMPLETE: ICD-10-CM

## 2025-03-17 PROCEDURE — 99213 OFFICE O/P EST LOW 20 MIN: CPT

## 2025-03-19 ENCOUNTER — APPOINTMENT (OUTPATIENT)
Dept: ORTHOPEDIC SURGERY | Facility: CLINIC | Age: 85
End: 2025-03-19
Payer: MEDICARE

## 2025-03-19 DIAGNOSIS — M17.11 UNILATERAL PRIMARY OSTEOARTHRITIS, RIGHT KNEE: ICD-10-CM

## 2025-03-19 PROCEDURE — 99212 OFFICE O/P EST SF 10 MIN: CPT

## 2025-03-21 RX ORDER — POVIDONE-IODINE 7.5 %
1 SOLUTION, NON-ORAL TOPICAL ONCE
Refills: 0 | Status: COMPLETED | OUTPATIENT
Start: 2025-03-27 | End: 2025-03-27

## 2025-03-24 RX ORDER — VANCOMYCIN HCL IN 5 % DEXTROSE 1.5G/250ML
1000 PLASTIC BAG, INJECTION (ML) INTRAVENOUS ONCE
Refills: 0 | Status: DISCONTINUED | OUTPATIENT
Start: 2025-03-27 | End: 2025-03-28

## 2025-03-26 NOTE — PROVIDER CONTACT NOTE (OTHER) - SITUATION
Attended joint education session on 3/19/2025 via online method with opportunity to ask questions. Contact information for follow up questions given.

## 2025-03-27 ENCOUNTER — INPATIENT (INPATIENT)
Facility: HOSPITAL | Age: 85
LOS: 0 days | Discharge: HOME | DRG: 554 | End: 2025-03-28
Attending: ORTHOPAEDIC SURGERY | Admitting: ORTHOPAEDIC SURGERY
Payer: MEDICARE

## 2025-03-27 ENCOUNTER — TRANSCRIPTION ENCOUNTER (OUTPATIENT)
Age: 85
End: 2025-03-27

## 2025-03-27 ENCOUNTER — APPOINTMENT (OUTPATIENT)
Dept: ORTHOPEDIC SURGERY | Facility: HOSPITAL | Age: 85
End: 2025-03-27

## 2025-03-27 VITALS
WEIGHT: 138.89 LBS | DIASTOLIC BLOOD PRESSURE: 73 MMHG | RESPIRATION RATE: 16 BRPM | HEIGHT: 63 IN | SYSTOLIC BLOOD PRESSURE: 180 MMHG | OXYGEN SATURATION: 100 % | TEMPERATURE: 97 F | HEART RATE: 80 BPM

## 2025-03-27 DIAGNOSIS — Z95.0 PRESENCE OF CARDIAC PACEMAKER: ICD-10-CM

## 2025-03-27 DIAGNOSIS — Z95.0 PRESENCE OF CARDIAC PACEMAKER: Chronic | ICD-10-CM

## 2025-03-27 DIAGNOSIS — Z98.89 OTHER SPECIFIED POSTPROCEDURAL STATES: Chronic | ICD-10-CM

## 2025-03-27 DIAGNOSIS — Z29.9 ENCOUNTER FOR PROPHYLACTIC MEASURES, UNSPECIFIED: ICD-10-CM

## 2025-03-27 DIAGNOSIS — Z98.49 CATARACT EXTRACTION STATUS, UNSPECIFIED EYE: Chronic | ICD-10-CM

## 2025-03-27 DIAGNOSIS — M17.11 UNILATERAL PRIMARY OSTEOARTHRITIS, RIGHT KNEE: ICD-10-CM

## 2025-03-27 DIAGNOSIS — I10 ESSENTIAL (PRIMARY) HYPERTENSION: ICD-10-CM

## 2025-03-27 DIAGNOSIS — Z47.1 AFTERCARE FOLLOWING JOINT REPLACEMENT SURGERY: Chronic | ICD-10-CM

## 2025-03-27 LAB — BLD GP AB SCN SERPL QL: SIGNIFICANT CHANGE UP

## 2025-03-27 PROCEDURE — 20985 CPTR-ASST DIR MS PX: CPT

## 2025-03-27 PROCEDURE — 99222 1ST HOSP IP/OBS MODERATE 55: CPT

## 2025-03-27 PROCEDURE — 73560 X-RAY EXAM OF KNEE 1 OR 2: CPT | Mod: 26,RT

## 2025-03-27 PROCEDURE — 27447 TOTAL KNEE ARTHROPLASTY: CPT | Mod: RT

## 2025-03-27 PROCEDURE — 27447 TOTAL KNEE ARTHROPLASTY: CPT | Mod: AS,RT

## 2025-03-27 RX ORDER — SODIUM CHLORIDE 9 G/1000ML
1000 INJECTION, SOLUTION INTRAVENOUS
Refills: 0 | Status: DISCONTINUED | OUTPATIENT
Start: 2025-03-27 | End: 2025-03-27

## 2025-03-27 RX ORDER — TRAMADOL HYDROCHLORIDE 50 MG/1
25 TABLET, FILM COATED ORAL EVERY 4 HOURS
Refills: 0 | Status: DISCONTINUED | OUTPATIENT
Start: 2025-03-27 | End: 2025-03-28

## 2025-03-27 RX ORDER — TRANEXAMIC ACID 1000 MG/10
1000 AMPUL (ML) INTRAVENOUS ONCE
Refills: 0 | Status: DISCONTINUED | OUTPATIENT
Start: 2025-03-27 | End: 2025-03-27

## 2025-03-27 RX ORDER — MAGNESIUM, ALUMINUM HYDROXIDE 200-200 MG
30 TABLET,CHEWABLE ORAL
Refills: 0 | Status: DISCONTINUED | OUTPATIENT
Start: 2025-03-27 | End: 2025-03-28

## 2025-03-27 RX ORDER — ONDANSETRON HCL/PF 4 MG/2 ML
4 VIAL (ML) INJECTION ONCE
Refills: 0 | Status: COMPLETED | OUTPATIENT
Start: 2025-03-27 | End: 2025-03-27

## 2025-03-27 RX ORDER — TRAMADOL HYDROCHLORIDE 50 MG/1
100 TABLET, FILM COATED ORAL EVERY 6 HOURS
Refills: 0 | Status: DISCONTINUED | OUTPATIENT
Start: 2025-03-27 | End: 2025-03-28

## 2025-03-27 RX ORDER — FENTANYL CITRATE-0.9 % NACL/PF 100MCG/2ML
25 SYRINGE (ML) INTRAVENOUS
Refills: 0 | Status: DISCONTINUED | OUTPATIENT
Start: 2025-03-27 | End: 2025-03-27

## 2025-03-27 RX ORDER — ACETAMINOPHEN 500 MG/5ML
975 LIQUID (ML) ORAL ONCE
Refills: 0 | Status: COMPLETED | OUTPATIENT
Start: 2025-03-27 | End: 2025-03-27

## 2025-03-27 RX ORDER — BISACODYL 5 MG
10 TABLET, DELAYED RELEASE (ENTERIC COATED) ORAL ONCE
Refills: 0 | Status: DISCONTINUED | OUTPATIENT
Start: 2025-03-29 | End: 2025-03-28

## 2025-03-27 RX ORDER — TRAMADOL HYDROCHLORIDE 50 MG/1
50 TABLET, FILM COATED ORAL EVERY 4 HOURS
Refills: 0 | Status: DISCONTINUED | OUTPATIENT
Start: 2025-03-27 | End: 2025-03-28

## 2025-03-27 RX ORDER — ONDANSETRON HCL/PF 4 MG/2 ML
4 VIAL (ML) INJECTION EVERY 6 HOURS
Refills: 0 | Status: DISCONTINUED | OUTPATIENT
Start: 2025-03-27 | End: 2025-03-28

## 2025-03-27 RX ORDER — SENNA 187 MG
2 TABLET ORAL AT BEDTIME
Refills: 0 | Status: DISCONTINUED | OUTPATIENT
Start: 2025-03-27 | End: 2025-03-28

## 2025-03-27 RX ORDER — ASPIRIN 325 MG
81 TABLET ORAL EVERY 12 HOURS
Refills: 0 | Status: DISCONTINUED | OUTPATIENT
Start: 2025-03-27 | End: 2025-03-28

## 2025-03-27 RX ORDER — MAGNESIUM HYDROXIDE 400 MG/5ML
30 SUSPENSION ORAL DAILY
Refills: 0 | Status: DISCONTINUED | OUTPATIENT
Start: 2025-03-27 | End: 2025-03-28

## 2025-03-27 RX ORDER — ACETAMINOPHEN 500 MG/5ML
1000 LIQUID (ML) ORAL ONCE
Refills: 0 | Status: DISCONTINUED | OUTPATIENT
Start: 2025-03-27 | End: 2025-03-28

## 2025-03-27 RX ORDER — GENTAMICIN SULFATE 40 MG/ML
190 VIAL (ML) INJECTION ONCE
Refills: 0 | Status: DISCONTINUED | OUTPATIENT
Start: 2025-03-27 | End: 2025-03-27

## 2025-03-27 RX ORDER — CEFAZOLIN SODIUM IN 0.9 % NACL 3 G/100 ML
2000 INTRAVENOUS SOLUTION, PIGGYBACK (ML) INTRAVENOUS
Refills: 0 | Status: COMPLETED | OUTPATIENT
Start: 2025-03-27 | End: 2025-03-27

## 2025-03-27 RX ORDER — POLYETHYLENE GLYCOL 3350 17 G/17G
17 POWDER, FOR SOLUTION ORAL AT BEDTIME
Refills: 0 | Status: DISCONTINUED | OUTPATIENT
Start: 2025-03-27 | End: 2025-03-28

## 2025-03-27 RX ORDER — ACETAMINOPHEN 500 MG/5ML
975 LIQUID (ML) ORAL EVERY 8 HOURS
Refills: 0 | Status: DISCONTINUED | OUTPATIENT
Start: 2025-03-27 | End: 2025-03-28

## 2025-03-27 RX ORDER — APREPITANT 40 MG/1
40 CAPSULE ORAL ONCE
Refills: 0 | Status: COMPLETED | OUTPATIENT
Start: 2025-03-27 | End: 2025-03-27

## 2025-03-27 RX ORDER — METOCLOPRAMIDE HCL 10 MG
10 TABLET ORAL EVERY 12 HOURS
Refills: 0 | Status: DISCONTINUED | OUTPATIENT
Start: 2025-03-27 | End: 2025-03-28

## 2025-03-27 RX ORDER — HYDROMORPHONE/SOD CHLOR,ISO/PF 2 MG/10 ML
0.25 SYRINGE (ML) INJECTION
Refills: 0 | Status: DISCONTINUED | OUTPATIENT
Start: 2025-03-27 | End: 2025-03-27

## 2025-03-27 RX ADMIN — Medication 975 MILLIGRAM(S): at 06:29

## 2025-03-27 RX ADMIN — Medication 2000 MILLIGRAM(S): at 23:52

## 2025-03-27 RX ADMIN — Medication 975 MILLIGRAM(S): at 21:22

## 2025-03-27 RX ADMIN — Medication 4 MILLIGRAM(S): at 10:41

## 2025-03-27 RX ADMIN — Medication 2000 MILLIGRAM(S): at 14:32

## 2025-03-27 RX ADMIN — Medication 975 MILLIGRAM(S): at 21:24

## 2025-03-27 RX ADMIN — Medication 81 MILLIGRAM(S): at 21:22

## 2025-03-27 RX ADMIN — Medication 100 MILLILITER(S): at 12:57

## 2025-03-27 RX ADMIN — Medication 1 APPLICATION(S): at 06:59

## 2025-03-27 RX ADMIN — Medication 4 MILLIGRAM(S): at 12:52

## 2025-03-27 RX ADMIN — Medication 20 MILLIGRAM(S): at 13:21

## 2025-03-27 RX ADMIN — Medication 10 MILLIGRAM(S): at 15:37

## 2025-03-27 NOTE — DISCHARGE NOTE PROVIDER - CARE PROVIDERS DIRECT ADDRESSES
[email or web address]

## 2025-03-27 NOTE — CONSULT NOTE ADULT - SUBJECTIVE AND OBJECTIVE BOX
85 y/o female with hx of HTN, bradycardia s/p pacemaker, she has had right knee pain for a few years, she had injections in her knee which used to help the last one 6 months ago did not help her, pain is relieved with rest, she come ijn here for elective right TKR on 3/27 with Dr. Suresh Robertson s/p procedure, her pain is well controlled, denies fever, chills, chest pain, sob.     Allergies:  	shellfish: Food, Hives  	latex: Latex, Pruritus  	Excedrin: Drug, Other, "High HR."  	clindamycin: Drug, Other, Swelling, "breaking of skin."  	codeine: Drug, Other, " high Heart rate." "skin breaks of hands and legs."  	Cipro: Drug, Swelling, Other, "breaking of skin."  	Advil: Drug, Other, " high Heart rate."  	amlodipine: Drug, Stomach Upset, Other, Pruritus, "histamine issues."      PAST MEDICAL HISTORY:  History of bradycardia     Hypertension     Phlebitis right leg  s/p surgery    Unilateral primary osteoarthritis, right knee     Vertigo.     PAST SURGICAL HISTORY:  Aftercare following right hip joint replacement surgery     History of  section     Pacemaker     Status post cataract extraction right eye.     FAMILY HISTORY:  Father  Still living? No  FH: heart disease, Age at diagnosis: Age Unknown.      Social History:  Not a smoker, drinker or using any pamela  85 y/o female with hx of HTN, bradycardia s/p pacemaker, she has had right knee pain for a few years, she had injections in her knee which used to help the last one 6 months ago did not help her, pain is relieved with rest, she come ijn here for elective right TKR on 3/27 with Dr. Suresh Robertson s/p procedure, her pain is well controlled, denies fever, chills, chest pain, sob.     Allergies:  	shellfish: Food, Hives  	latex: Latex, Pruritus  	Excedrin: Drug, Other, "High HR."  	clindamycin: Drug, Other, Swelling, "breaking of skin."  	codeine: Drug, Other, " high Heart rate." "skin breaks of hands and legs."  	Cipro: Drug, Swelling, Other, "breaking of skin."  	Advil: Drug, Other, " high Heart rate."  	amlodipine: Drug, Stomach Upset, Other, Pruritus, "histamine issues."      PAST MEDICAL HISTORY:  History of bradycardia     Hypertension     Phlebitis right leg  s/p surgery    Unilateral primary osteoarthritis, right knee     Vertigo.     PAST SURGICAL HISTORY:  Aftercare following right hip joint replacement surgery     History of  section     Pacemaker     Status post cataract extraction right eye.     FAMILY HISTORY:  Father  Still living? No  FH: heart disease, Age at diagnosis: Age Unknown.      Social History:  Not a smoker, drinker or using any drugs       Vital Signs Last 24 Hrs  T(C): 36.4 (27 Mar 2025 12:42), Max: 36.9 (27 Mar 2025 09:50)  T(F): 97.5 (27 Mar 2025 12:42), Max: 98.5 (27 Mar 2025 09:50)  HR: 67 (27 Mar 2025 12:42) (63 - 80)  BP: 145/70 (27 Mar 2025 12:42) (131/58 - 180/73)  BP(mean): 79 (27 Mar 2025 12:10) (73 - 109)  RR: 18 (27 Mar 2025 12:42) (8 - 20)  SpO2: 96% (27 Mar 2025 12:42) (89% - 100%)    Parameters below as of 27 Mar 2025 12:42  Patient On (Oxygen Delivery Method): room air        PHYSICAL EXAM:    GENERAL: Elderly female looking comfortable   HEENT: PERRL, +EOMI  NECK: soft, Supple, No JVD   CHEST/LUNG: Clear to auscultate bilaterally; No wheezing  HEART: S1S2+, Regular rate and rhythm; No murmurs  ABDOMEN: Soft, Nontender, Nondistended; Bowel sounds present  EXTREMITIES:  1+ Peripheral Pulses, No edema, right knee Joint area cover with dressing, no bleeding or soaking   SKIN: No rashes or lesions  NEURO: AAOX3  PSYCH: normal mood 85 y/o female with hx of HTN, bradycardia s/p pacemaker, she has had right knee pain for a few years, she had injections in her knee which used to help the last one 6 months ago did not help her, pain is relieved with rest, she come ijn here for elective right TKR on 3/27 with Dr. Suresh Robertson s/p procedure, her pain is well controlled, denies fever, chills, chest pain, sob, she has some nausea.     Allergies:  	shellfish: Food, Hives  	latex: Latex, Pruritus  	Excedrin: Drug, Other, "High HR."  	clindamycin: Drug, Other, Swelling, "breaking of skin."  	codeine: Drug, Other, " high Heart rate." "skin breaks of hands and legs."  	Cipro: Drug, Swelling, Other, "breaking of skin."  	Advil: Drug, Other, " high Heart rate."  	amlodipine: Drug, Stomach Upset, Other, Pruritus, "histamine issues."      PAST MEDICAL HISTORY:  History of bradycardia     Hypertension     Phlebitis right leg  s/p surgery    Unilateral primary osteoarthritis, right knee     Vertigo.     PAST SURGICAL HISTORY:  Aftercare following right hip joint replacement surgery     History of  section     Pacemaker     Status post cataract extraction right eye.     FAMILY HISTORY:  Father  Still living? No  FH: heart disease, Age at diagnosis: Age Unknown.      Social History:  Not a smoker, drinker or using any drugs       Vital Signs Last 24 Hrs  T(C): 36.4 (27 Mar 2025 12:42), Max: 36.9 (27 Mar 2025 09:50)  T(F): 97.5 (27 Mar 2025 12:42), Max: 98.5 (27 Mar 2025 09:50)  HR: 67 (27 Mar 2025 12:42) (63 - 80)  BP: 145/70 (27 Mar 2025 12:42) (131/58 - 180/73)  BP(mean): 79 (27 Mar 2025 12:10) (73 - 109)  RR: 18 (27 Mar 2025 12:42) (8 - 20)  SpO2: 96% (27 Mar 2025 12:42) (89% - 100%)    Parameters below as of 27 Mar 2025 12:42  Patient On (Oxygen Delivery Method): room air        PHYSICAL EXAM:    GENERAL: Elderly female looking comfortable   HEENT: PERRL, +EOMI  NECK: soft, Supple, No JVD   CHEST/LUNG: Clear to auscultate bilaterally; No wheezing  HEART: S1S2+, Regular rate and rhythm; No murmurs  ABDOMEN: Soft, Nontender, Nondistended; Bowel sounds present  EXTREMITIES:  1+ Peripheral Pulses, No edema, right knee Joint area cover with dressing, no bleeding or soaking   SKIN: No rashes or lesions  NEURO: AAOX3  PSYCH: normal mood

## 2025-03-27 NOTE — DISCHARGE NOTE PROVIDER - NSDCFUADDINST_GEN_ALL_CORE_FT
The patient will be seen in the office between 2-3 weeks for wound check.   **Your first post-operative visit has been scheduled prior to your admission. PLEASE CONTACT OFFICE TO CONFIRM THE APPOINTMENT DATE. Sutures/Staples/Tape will be removed at that time.  **  The silver based dressing is to be removed 7 days from the date of surgery.   ** CONTACT THE OFFICE IF THE FOLLOWING DEVELOP:  - the dressing becomes soiled or saturated  - you develop a fever greater that 101F  - the wound becomes red or you develop blistering around the wound  * Patient may shower after post-op day #3.   * The patient will continue home PT consistent with  total knee replacement protocol. Transition to outpatient PT will occur at the time of the first office visit.   * The patient will practice knee extension exercises regularly to minimize hamstring contraction.   * The patient is FULL weight bearing.  * The patient will continue ASPIRIN for 6 weeks after surgery for blood clot prevention.  *** The patient will continue ELIQUIS for 2 weeks and then begin ASPIRIN for blood clot prevention. *** While on blood clot prevention medications, the patient will take daily omeprazole or other similar medication to protect the stomach from irritation.   * The patient will take OXYCODONE for pain control and adjust according to prescription and patient needs.  The patient will also take TYLENOL 975mg every 8 hours for pain.   Contact the office if pain increases while taking prescribed pain medications or related concerns develop.  * Celebrex will be taken twice daily for 3 weeks for pain control and prevention of excessive bone growth. Additional prescription may be requested at your office follow-up visit.   * The patient will take Senna S while taking oxycodone to prevent narcotic associated constipation.  Additionally, increase water intake (drink at least 8 glasses of water daily) and try adding fiber to the diet by eating fruits, vegetables and foods that are rich in grains. If constipation is experienced, contact the medical/primary care provider to discuss further treatment options.  * To avoid injury at home:  - continue use of rolling walker until cleared by physical therapist  - have family or friend remove all throw rug or objects in hallways that may present a trip hazard.  - if you experience any dizziness or medical concerns, call your medical doctor or  911.  * The implant may activate metal detection devices.   The patient will be seen in the office between 2-3 weeks for wound check.   **Your first post-operative visit has been scheduled prior to your admission. PLEASE CONTACT OFFICE TO CONFIRM THE APPOINTMENT DATE. Sutures/Staples/Tape will be removed at that time.  **  The silver based dressing is to be removed 7 days from the date of surgery.   ** CONTACT THE OFFICE IF THE FOLLOWING DEVELOP:  - the dressing becomes soiled or saturated  - you develop a fever greater that 101F  - the wound becomes red or you develop blistering around the wound  * Patient may shower after post-op day #3.   * The patient will continue home PT consistent with  total knee replacement protocol. Transition to outpatient PT will occur at the time of the first office visit.   * The patient will practice knee extension exercises regularly to minimize hamstring contraction.   * The patient is FULL weight bearing.  * The patient will continue ASPIRIN for 6 weeks after surgery for blood clot prevention.   While on blood clot prevention medications, the patient will take daily omeprazole or other similar medication to protect the stomach from irritation.   * The patient will take OXYCODONE for pain control and adjust according to prescription and patient needs.  The patient will also take TYLENOL 975mg every 8 hours for pain.   Contact the office if pain increases while taking prescribed pain medications or related concerns develop.  * Celebrex will be taken twice daily for 3 weeks for pain control and prevention of excessive bone growth. Additional prescription may be requested at your office follow-up visit.   * The patient will take Senna S while taking oxycodone to prevent narcotic associated constipation.  Additionally, increase water intake (drink at least 8 glasses of water daily) and try adding fiber to the diet by eating fruits, vegetables and foods that are rich in grains. If constipation is experienced, contact the medical/primary care provider to discuss further treatment options.  * To avoid injury at home:  - continue use of rolling walker until cleared by physical therapist  - have family or friend remove all throw rug or objects in hallways that may present a trip hazard.  - if you experience any dizziness or medical concerns, call your medical doctor or  911.  * The implant may activate metal detection devices.

## 2025-03-27 NOTE — DISCHARGE NOTE NURSING/CASE MANAGEMENT/SOCIAL WORK - PATIENT PORTAL LINK FT
You can access the FollowMyHealth Patient Portal offered by [facility] by registering at the following website: [email or web address]. By joining [facility]’s FollowMyHealth portal, you will also be able to view your health information using other applications (apps) compatible with our system.

## 2025-03-27 NOTE — DISCHARGE NOTE PROVIDER - NSDCFUSCHEDAPPT_GEN_ALL_CORE_FT
Suresh Robertson  [facility]  ORTHOSURG [address]  Scheduled Appointment: 03/27/2025    [facility]  ELECTROPH [address]  Scheduled Appointment: 03/27/2025    Elebiary, Yeon J  [facility]  ORTHOSURG [address]  Scheduled Appointment: 04/15/2025    Suresh Robertson  [facility]  ORTHOSURG [address]  Scheduled Appointment: 06/04/2025     Elebiary, Yeon J  [facility]  ORTHOSURG [address]  Scheduled Appointment: 04/15/2025    [facility]  ELECTROPH [address]  Scheduled Appointment: 04/29/2025    Suresh Robertson  [facility]  ORTHOSURG [address]  Scheduled Appointment: 06/04/2025

## 2025-03-27 NOTE — PATIENT PROFILE ADULT - FALL HARM RISK - HARM RISK INTERVENTIONS
Assistance with ambulation/Assistance OOB with selected safe patient handling equipment/Communicate Risk of Fall with Harm to all staff/Discuss with provider need for PT consult/Monitor gait and stability/Provide patient with walking aids - walker, cane, crutches/Reinforce activity limits and safety measures with patient and family/Sit up slowly, dangle for a short time, stand at bedside before walking/Tailored Fall Risk Interventions/Use of alarms - bed, chair and/or voice tab/Visual Cue: Yellow wristband and red socks/Bed in lowest position, wheels locked, appropriate side rails in place/Call bell, personal items and telephone in reach/Instruct patient to call for assistance before getting out of bed or chair/Non-slip footwear when patient is out of bed/[place] to call system/Physically safe environment - no spills, clutter or unnecessary equipment/Purposeful Proactive Rounding/Room/bathroom lighting operational, light cord in reach

## 2025-03-27 NOTE — DISCHARGE NOTE PROVIDER - NSDCMRMEDTOKEN_GEN_ALL_CORE_FT
acetaminophen 325 mg oral tablet: 3 tab(s) orally every 8 hours  aspirin 81 mg oral delayed release tablet: 1 tab(s) orally every 12 hours  omeprazole 20 mg oral delayed release tablet: 1 tab(s) orally once a day  senna leaf extract oral tablet: 2 tab(s) orally once a day (at bedtime)  traMADol 50 mg oral tablet: 1 tab(s) orally every 4 hours as needed for Moderate Pain (4 - 6) MDD: 5 tabs

## 2025-03-27 NOTE — INPATIENT CERTIFICATION FOR MEDICARE PATIENTS - RISKS OF ADVERSE EVENTS
Concern for cardiopulmonary deterioration/Concern for neurologic deterioration/Concern for renal deterioration/Other:

## 2025-03-27 NOTE — PROGRESS NOTE ADULT - SUBJECTIVE AND OBJECTIVE BOX
History: Patient is status post right total knee arthroplasty on 3/27/2025, POD # 0.   Patient is doing well and is comfortable.   The patient's pain is controlled using the prescribed pain medications.   She has not started PT yet  Denies nausea, vomiting, chest pain, shortness of breath, abdominal pain or fever. No new complaints.    Vital Signs Last 24 Hrs  T(C): 36.9 (27 Mar 2025 09:50), Max: 36.9 (27 Mar 2025 09:50)  T(F): 98.5 (27 Mar 2025 09:50), Max: 98.5 (27 Mar 2025 09:50)  HR: 63 (27 Mar 2025 10:50) (63 - 80)  BP: 141/56 (27 Mar 2025 10:50) (135/47 - 180/73)  BP(mean): 78 (27 Mar 2025 10:50) (73 - 109)  RR: 10 (27 Mar 2025 10:50) (8 - 20)  SpO2: 92% (27 Mar 2025 10:50) (89% - 100%)    Parameters below as of 27 Mar 2025 10:20  Patient On (Oxygen Delivery Method): nasal cannula  O2 Flow (L/min): 2      Physical exam: The Right knee dressing is clean, dry and intact. No drainage or discharge.  The calf is supple nontender.   No calf tenderness. Sensation to light touch is grossly intact distally.   Motor function distally is 5/5. Extensor hallucis longus and flexor hallucis longus are intact.   No foot drop. 2+ dorsalis pedis pulse. Capillary refill is less than 2 seconds. No cyanosis.    Primary Orthopedic Assessment:  • s/p RIGHT total knee replacement pod #0    Plan:   • DVT prophylaxis aspirin 81mg bid, including use of compression devices and ankle pumps  • Continue physical therapy  • Weightbearing as tolerated of the Right lower extremity with assistance of a walker  • Incentive spirometry encouraged  • Pain control as clinically indicated  • Ancef per scip

## 2025-03-27 NOTE — DISCHARGE NOTE NURSING/CASE MANAGEMENT/SOCIAL WORK - FINANCIAL ASSISTANCE
[facility] provides services at a reduced cost to those who are determined to be eligible through [facility]’s financial assistance program. Information regarding [facility]’s financial assistance program can be found by going to [email or web address] or by calling [phone].

## 2025-03-27 NOTE — DISCHARGE NOTE PROVIDER - HOSPITAL COURSE
The patient underwent a RIGHT TOTAL KNEE REPLACEMENT on 3/27/25. The patient received antibiotics consistent with SCIP guidelines. The patient underwent the procedure and had no intra-operative complications. Post-operatively, the patient was seen by medicine and PT. The patient received ELIQUIS ASPIRIN for DVTP. The patient received pain medications per orthopedic pain management pathway and the pain was appropriately controlled. The patient did not have any post-operative medical complications. The patient was discharged in stable condition.   The patient underwent a RIGHT TOTAL KNEE REPLACEMENT on 3/27/25. The patient received antibiotics consistent with SCIP guidelines. The patient underwent the procedure and had no intra-operative complications. Post-operatively, the patient was seen by medicine and PT. The patient received ASPIRIN for DVTP. The patient received pain medications per orthopedic pain management pathway and the pain was appropriately controlled. The patient did not have any post-operative medical complications. The patient was discharged in stable condition.

## 2025-03-27 NOTE — CONSULT NOTE ADULT - ASSESSMENT
85 y/o female with hx of HTN, bradycardia s/p pacemaker, she has had right knee pain for a few years, she had injections in her knee which used to help the last one 6 months ago did not help her, pain is relieved with rest, she come ijn here for elective right TKR on 3/27 with Dr. Surseh Robertson s/p procedure.  83 y/o female with hx of HTN, bradycardia s/p pacemaker, she has had right knee pain for a few years, she had injections in her knee which used to help the last one 6 months ago did not help her, pain is relieved with rest, she come ijn here for elective right TKR on 3/27 with Dr. Suresh Robertson s/p procedure.     Plan:     OA of the right knee     - post op no complications  - VS stable  - abx per ortho   - c/w anti hypertensive to be restarted post op day 02 except if blood pressure goes >150 systolic   - c/w IVF x 24 hrs then reassess per ortho  - opiate induced constipation regimen   - encouraging incentive spirometry   -c/w local wound care per ortho   -DVT prophylaxis and Pain meds as per Ortho team   -PT/OT and weight bearing per ortho  83 y/o female with hx of HTN, bradycardia s/p pacemaker, she has had right knee pain for a few years, she had injections in her knee which used to help the last one 6 months ago did not help her, pain is relieved with rest, she come ijn here for elective right TKR on 3/27 with Dr. Suresh Robertson s/p procedure.     Plan:     OA of the right knee s/p TKR post op day 0:     - VS stable  - abx per ortho   - c/w anti hypertensive to be restarted post op day 02 except if blood pressure goes >150 systolic   - c/w IVF x 24 hrs then reassess per ortho  - opiate induced constipation regimen   - encouraging incentive spirometry   -c/w local wound care per ortho   -DVT prophylaxis and Pain meds as per Ortho team   -PT/OT and weight bearing per ortho     Post op Nausea: will give Zonfran/ Reglan, continue with Protonix

## 2025-03-27 NOTE — DISCHARGE NOTE PROVIDER - CARE PROVIDER_API CALL
Suresh Robertson  [facility]  [address] [place] [address]  Phone: [phone]  Fax: [phone]  Follow Up Time:

## 2025-03-27 NOTE — PHYSICAL THERAPY INITIAL EVALUATION ADULT - ADDITIONAL COMMENTS
pt reports living in private home with  who is able to assist. No steps to enter/inside. Independent prior to admission. Owns all DME

## 2025-03-28 VITALS
DIASTOLIC BLOOD PRESSURE: 65 MMHG | TEMPERATURE: 98 F | HEART RATE: 81 BPM | OXYGEN SATURATION: 96 % | SYSTOLIC BLOOD PRESSURE: 124 MMHG | RESPIRATION RATE: 18 BRPM

## 2025-03-28 PROCEDURE — 86901 BLOOD TYPING SEROLOGIC RH(D): CPT

## 2025-03-28 PROCEDURE — C1713: CPT

## 2025-03-28 PROCEDURE — 36415 COLL VENOUS BLD VENIPUNCTURE: CPT

## 2025-03-28 PROCEDURE — 27447 TOTAL KNEE ARTHROPLASTY: CPT

## 2025-03-28 PROCEDURE — C1776: CPT

## 2025-03-28 PROCEDURE — 86850 RBC ANTIBODY SCREEN: CPT

## 2025-03-28 PROCEDURE — 97116 GAIT TRAINING THERAPY: CPT

## 2025-03-28 PROCEDURE — 99232 SBSQ HOSP IP/OBS MODERATE 35: CPT

## 2025-03-28 PROCEDURE — 73560 X-RAY EXAM OF KNEE 1 OR 2: CPT

## 2025-03-28 PROCEDURE — 86900 BLOOD TYPING SEROLOGIC ABO: CPT

## 2025-03-28 PROCEDURE — 97110 THERAPEUTIC EXERCISES: CPT

## 2025-03-28 PROCEDURE — C9399: CPT

## 2025-03-28 RX ORDER — SENNA 187 MG
2 TABLET ORAL
Qty: 14 | Refills: 0
Start: 2025-03-28 | End: 2025-04-03

## 2025-03-28 RX ORDER — OMEPRAZOLE 20 MG/1
1 CAPSULE, DELAYED RELEASE ORAL
Qty: 42 | Refills: 0
Start: 2025-03-28 | End: 2025-05-08

## 2025-03-28 RX ORDER — ASPIRIN 325 MG
1 TABLET ORAL
Qty: 84 | Refills: 0
Start: 2025-03-28 | End: 2025-05-08

## 2025-03-28 RX ORDER — ACETAMINOPHEN 500 MG/5ML
3 LIQUID (ML) ORAL
Qty: 63 | Refills: 0
Start: 2025-03-28 | End: 2025-04-03

## 2025-03-28 RX ORDER — TRAMADOL HYDROCHLORIDE 50 MG/1
1 TABLET, FILM COATED ORAL
Qty: 42 | Refills: 0
Start: 2025-03-28 | End: 2025-04-03

## 2025-03-28 RX ADMIN — Medication 81 MILLIGRAM(S): at 10:28

## 2025-03-28 RX ADMIN — TRAMADOL HYDROCHLORIDE 50 MILLIGRAM(S): 50 TABLET, FILM COATED ORAL at 06:00

## 2025-03-28 RX ADMIN — Medication 40 MILLIGRAM(S): at 05:00

## 2025-03-28 RX ADMIN — TRAMADOL HYDROCHLORIDE 50 MILLIGRAM(S): 50 TABLET, FILM COATED ORAL at 05:00

## 2025-03-28 RX ADMIN — TRAMADOL HYDROCHLORIDE 50 MILLIGRAM(S): 50 TABLET, FILM COATED ORAL at 10:30

## 2025-03-28 RX ADMIN — TRAMADOL HYDROCHLORIDE 50 MILLIGRAM(S): 50 TABLET, FILM COATED ORAL at 11:30

## 2025-03-28 NOTE — PROGRESS NOTE ADULT - ASSESSMENT
85 y/o female with hx of HTN, bradycardia s/p pacemaker, she has had right knee pain for a few years, she had injections in her knee which used to help the last one 6 months ago did not help her, pain is relieved with rest, she come ijn here for elective right TKR on 3/27 with Dr. Suresh Robertson s/p procedure.     Plan:     OA of the right knee s/p TKR post op day 1:     - VS stable  - abx per ortho completed  - c/w anti hypertensive to be restarted post op day 02 except if blood pressure goes >150 systolic   - c/w IVF until taking po well  - opiate induced constipation regimen   - encouraging incentive spirometry   -c/w local wound care per ortho   -DVT prophylaxis and Pain meds as per Ortho team   -PT/OT and weight bearing per ortho     Post op Nausea: Resolved. Avoid giving medications on empty stomach    No medical contraindications to DC once cleared by Ortho and PT 83 y/o female with hx of HTN, bradycardia s/p pacemaker, she has had right knee pain for a few years, she had injections in her knee which used to help the last one 6 months ago did not help her, pain is relieved with rest, she come ijn here for elective right TKR on 3/27 with Dr. Suresh Robertson s/p procedure.     Plan:     OA of the right knee s/p TKR post op day 1:     - VS stable  - abx per ortho completed  - opiate induced constipation regimen   - encouraging incentive spirometry   -c/w local wound care per ortho   -DVT prophylaxis and Pain meds as per Ortho team   -PT/OT and weight bearing per ortho     Post op Nausea: Resolved. Avoid giving medications on empty stomach    No medical contraindications to DC once cleared by Ortho and PT

## 2025-03-28 NOTE — PROGRESS NOTE ADULT - SUBJECTIVE AND OBJECTIVE BOX
CC: right knee surgery    INTERVAL HPI/OVERNIGHT EVENTS: Worked with PT. No more nausea. Denies chest pain, SOB, dizziness, fever, chills. Anticipates going home.     Vital Signs Last 24 Hrs  T(C): 36.6 (28 Mar 2025 04:41), Max: 36.9 (27 Mar 2025 09:50)  T(F): 97.9 (28 Mar 2025 04:41), Max: 98.5 (27 Mar 2025 09:50)  HR: 75 (28 Mar 2025 04:41) (60 - 79)  BP: 129/72 (28 Mar 2025 04:41) (129/65 - 173/82)  BP(mean): 79 (27 Mar 2025 12:10) (73 - 109)  RR: 19 (28 Mar 2025 04:41) (8 - 20)  SpO2: 96% (28 Mar 2025 04:41) (89% - 98%)    Parameters below as of 28 Mar 2025 04:41  Patient On (Oxygen Delivery Method): room air    PHYSICAL EXAM:    GENERAL: Elderly female, NAD  HEENT: PERRL, +EOMI  NECK: soft, Supple, No JVD   CHEST/LUNG: Clear to auscultate bilaterally; No wheezing  HEART: S1S2+, Regular rate and rhythm; No murmurs  ABDOMEN: Soft, Nontender, Nondistended; Bowel sounds present  EXTREMITIES:  1+ Peripheral Pulses, No edema, right knee dsg C/D/I  SKIN: No rashes or lesions  NEURO: AAOX3  PSYCH: normal mood    LABS:                  MEDICATIONS  (STANDING):  acetaminophen     Tablet .. 975 milliGRAM(s) Oral every 8 hours  acetaminophen   IVPB .. 1000 milliGRAM(s) IV Intermittent once  aspirin enteric coated 81 milliGRAM(s) Oral every 12 hours  pantoprazole    Tablet 40 milliGRAM(s) Oral before breakfast  polyethylene glycol 3350 17 Gram(s) Oral at bedtime  senna 2 Tablet(s) Oral at bedtime  sodium chloride 0.9%. 1000 milliLiter(s) (100 mL/Hr) IV Continuous <Continuous>  vancomycin  IVPB 1000 milliGRAM(s) IV Intermittent once    MEDICATIONS  (PRN):  aluminum hydroxide/magnesium hydroxide/simethicone Suspension 30 milliLiter(s) Oral four times a day PRN Indigestion  magnesium hydroxide Suspension 30 milliLiter(s) Oral daily PRN Constipation  metoclopramide Injectable 10 milliGRAM(s) IV Push every 12 hours PRN nausea and vomiting  ondansetron Injectable 4 milliGRAM(s) IV Push every 6 hours PRN Nausea and/or Vomiting  traMADol 25 milliGRAM(s) Oral every 4 hours PRN Mild Pain (1 - 3)  traMADol 50 milliGRAM(s) Oral every 4 hours PRN Moderate Pain (4 - 6)  traMADol 100 milliGRAM(s) Oral every 6 hours PRN Severe Pain (7 - 10)      RADIOLOGY & ADDITIONAL TESTS:   CC: right knee surgery    INTERVAL HPI/OVERNIGHT EVENTS: Worked with PT. No more nausea. Denies chest pain, SOB, dizziness, fever, chills. Anticipates going home.     Vital Signs Last 24 Hrs  T(C): 36.6 (28 Mar 2025 04:41), Max: 36.9 (27 Mar 2025 09:50)  T(F): 97.9 (28 Mar 2025 04:41), Max: 98.5 (27 Mar 2025 09:50)  HR: 75 (28 Mar 2025 04:41) (60 - 79)  BP: 129/72 (28 Mar 2025 04:41) (129/65 - 173/82)  BP(mean): 79 (27 Mar 2025 12:10) (73 - 109)  RR: 19 (28 Mar 2025 04:41) (8 - 20)  SpO2: 96% (28 Mar 2025 04:41) (89% - 98%)    Parameters below as of 28 Mar 2025 04:41  Patient On (Oxygen Delivery Method): room air    PHYSICAL EXAM:    GENERAL: Elderly female, NAD  HEENT: PERRL, +EOMI  NECK: soft, Supple, No JVD   CHEST/LUNG: Clear to auscultate bilaterally; No wheezing  HEART: S1S2+, Regular rate and rhythm; No murmurs  ABDOMEN: Soft, Nontender, Nondistended; Bowel sounds present  EXTREMITIES:  1+ Peripheral Pulses, No edema, right knee dsg C/D/I  SKIN: No rashes or lesions  NEURO: AAOX3  PSYCH: normal mood      MEDICATIONS  (STANDING):  acetaminophen     Tablet .. 975 milliGRAM(s) Oral every 8 hours  acetaminophen   IVPB .. 1000 milliGRAM(s) IV Intermittent once  aspirin enteric coated 81 milliGRAM(s) Oral every 12 hours  pantoprazole    Tablet 40 milliGRAM(s) Oral before breakfast  polyethylene glycol 3350 17 Gram(s) Oral at bedtime  senna 2 Tablet(s) Oral at bedtime  sodium chloride 0.9%. 1000 milliLiter(s) (100 mL/Hr) IV Continuous <Continuous>  vancomycin  IVPB 1000 milliGRAM(s) IV Intermittent once    MEDICATIONS  (PRN):  aluminum hydroxide/magnesium hydroxide/simethicone Suspension 30 milliLiter(s) Oral four times a day PRN Indigestion  magnesium hydroxide Suspension 30 milliLiter(s) Oral daily PRN Constipation  metoclopramide Injectable 10 milliGRAM(s) IV Push every 12 hours PRN nausea and vomiting  ondansetron Injectable 4 milliGRAM(s) IV Push every 6 hours PRN Nausea and/or Vomiting  traMADol 25 milliGRAM(s) Oral every 4 hours PRN Mild Pain (1 - 3)  traMADol 50 milliGRAM(s) Oral every 4 hours PRN Moderate Pain (4 - 6)  traMADol 100 milliGRAM(s) Oral every 6 hours PRN Severe Pain (7 - 10)      RADIOLOGY & ADDITIONAL TESTS:

## 2025-03-28 NOTE — PROGRESS NOTE ADULT - SUBJECTIVE AND OBJECTIVE BOX
RODRICK COSTELLO    [id number]    History: 85y Female is status post right total knee arthroplasty on 3/27/25, POD # 1. Patient is doing well and is comfortable. The patient's pain is controlled using the prescribed pain medications. The patient is participating in physical therapy. Denies nausea, vomiting, chest pain, shortness of breath, abdominal pain or fever. No new complaints.      MEDICATIONS  (STANDING):  acetaminophen     Tablet .. 975 milliGRAM(s) Oral every 8 hours  acetaminophen   IVPB .. 1000 milliGRAM(s) IV Intermittent once  aspirin enteric coated 81 milliGRAM(s) Oral every 12 hours  pantoprazole    Tablet 40 milliGRAM(s) Oral before breakfast  polyethylene glycol 3350 17 Gram(s) Oral at bedtime  senna 2 Tablet(s) Oral at bedtime  sodium chloride 0.9%. 1000 milliLiter(s) (100 mL/Hr) IV Continuous <Continuous>  vancomycin  IVPB 1000 milliGRAM(s) IV Intermittent once    MEDICATIONS  (PRN):  aluminum hydroxide/magnesium hydroxide/simethicone Suspension 30 milliLiter(s) Oral four times a day PRN Indigestion  magnesium hydroxide Suspension 30 milliLiter(s) Oral daily PRN Constipation  metoclopramide Injectable 10 milliGRAM(s) IV Push every 12 hours PRN nausea and vomiting  ondansetron Injectable 4 milliGRAM(s) IV Push every 6 hours PRN Nausea and/or Vomiting  traMADol 25 milliGRAM(s) Oral every 4 hours PRN Mild Pain (1 - 3)  traMADol 50 milliGRAM(s) Oral every 4 hours PRN Moderate Pain (4 - 6)  traMADol 100 milliGRAM(s) Oral every 6 hours PRN Severe Pain (7 - 10)      Physical exam: The right knee dressing is clean, dry and intact. No drainage or discharge. No erythema is noted. No blistering. No ecchymosis. The calf is supple nontender. Passive range of motion is acceptable to due postoperative pain. No calf tenderness. Sensation to light touch is grossly intact distally. Motor function distally is 5/5. Extensor hallucis longus and flexor hallucis longus are intact. No foot drop. 2+ dorsalis pedis pulse. Capillary refill is less than 2 seconds. No cyanosis.    Primary Orthopedic Assessment:  • s/p RIGHT total knee replacement    Secondary  Orthopedic Assessment(s):   •     Secondary  Medical Assessment(s):   •     Plan:   • DVT prophylaxis as prescribed of ASA, including use of compression devices and ankle pumps  • Continue physical therapy  • Weightbearing as tolerated of the right lower extremity with assistance of a walker  • Incentive spirometry encouraged  • Pain control as clinically indicated  • Discharge planning – anticipated discharge is Home

## 2025-03-28 NOTE — PROGRESS NOTE ADULT - NS ATTEND AMEND GEN_ALL_CORE FT
Patient is see and evaluated, chart reviewed in detail, agree with assessment and plan of the NP  patient's pain is well controlled, has no complains  CTA b/l   right knee Joint area is covered with clean dressing, no bleeding or soaking  Will continue with current plan of care  will d/c IV fluids.  nausea is better     Patient is stable from the medicine point of view for discharge pending PT and Ortho eval.

## 2025-03-31 ENCOUNTER — APPOINTMENT (OUTPATIENT)
Dept: PULMONOLOGY | Facility: CLINIC | Age: 85
End: 2025-03-31

## 2025-03-31 PROBLEM — M17.11 UNILATERAL PRIMARY OSTEOARTHRITIS, RIGHT KNEE: Chronic | Status: ACTIVE | Noted: 2025-03-05

## 2025-03-31 PROBLEM — Z87.898 PERSONAL HISTORY OF OTHER SPECIFIED CONDITIONS: Chronic | Status: ACTIVE | Noted: 2025-03-05

## 2025-03-31 PROBLEM — I10 ESSENTIAL (PRIMARY) HYPERTENSION: Chronic | Status: ACTIVE | Noted: 2025-03-05

## 2025-04-09 PROBLEM — Z47.1 AFTERCARE FOLLOWING RIGHT KNEE JOINT REPLACEMENT SURGERY: Status: ACTIVE | Noted: 2025-04-09

## 2025-04-14 ENCOUNTER — APPOINTMENT (OUTPATIENT)
Dept: ORTHOPEDIC SURGERY | Facility: CLINIC | Age: 85
End: 2025-04-14
Payer: MEDICARE

## 2025-04-14 ENCOUNTER — APPOINTMENT (OUTPATIENT)
Dept: ORTHOPEDIC SURGERY | Facility: CLINIC | Age: 85
End: 2025-04-14

## 2025-04-14 DIAGNOSIS — Z96.651 AFTERCARE FOLLOWING JOINT REPLACEMENT SURGERY: ICD-10-CM

## 2025-04-14 DIAGNOSIS — Z47.1 AFTERCARE FOLLOWING JOINT REPLACEMENT SURGERY: ICD-10-CM

## 2025-04-14 DIAGNOSIS — Z96.651 PRESENCE OF RIGHT ARTIFICIAL KNEE JOINT: ICD-10-CM

## 2025-04-14 PROCEDURE — 99024 POSTOP FOLLOW-UP VISIT: CPT

## 2025-04-14 PROCEDURE — 73562 X-RAY EXAM OF KNEE 3: CPT | Mod: RT

## 2025-04-18 ENCOUNTER — TRANSCRIPTION ENCOUNTER (OUTPATIENT)
Age: 85
End: 2025-04-18

## 2025-04-29 ENCOUNTER — NON-APPOINTMENT (OUTPATIENT)
Age: 85
End: 2025-04-29

## 2025-04-29 ENCOUNTER — APPOINTMENT (OUTPATIENT)
Dept: ELECTROPHYSIOLOGY | Facility: CLINIC | Age: 85
End: 2025-04-29
Payer: MEDICARE

## 2025-04-29 PROCEDURE — 93294 REM INTERROG EVL PM/LDLS PM: CPT

## 2025-04-29 PROCEDURE — 93296 REM INTERROG EVL PM/IDS: CPT

## 2025-06-04 ENCOUNTER — APPOINTMENT (OUTPATIENT)
Dept: ORTHOPEDIC SURGERY | Facility: CLINIC | Age: 85
End: 2025-06-04
Payer: MEDICARE

## 2025-06-04 VITALS
WEIGHT: 140 LBS | HEIGHT: 63 IN | BODY MASS INDEX: 24.8 KG/M2 | DIASTOLIC BLOOD PRESSURE: 81 MMHG | SYSTOLIC BLOOD PRESSURE: 156 MMHG

## 2025-06-04 DIAGNOSIS — Z96.651 AFTERCARE FOLLOWING JOINT REPLACEMENT SURGERY: ICD-10-CM

## 2025-06-04 DIAGNOSIS — Z47.1 AFTERCARE FOLLOWING JOINT REPLACEMENT SURGERY: ICD-10-CM

## 2025-06-04 DIAGNOSIS — Z96.651 PRESENCE OF RIGHT ARTIFICIAL KNEE JOINT: ICD-10-CM

## 2025-06-04 PROCEDURE — 73562 X-RAY EXAM OF KNEE 3: CPT | Mod: RT

## 2025-06-04 PROCEDURE — 99024 POSTOP FOLLOW-UP VISIT: CPT

## 2025-07-29 ENCOUNTER — APPOINTMENT (OUTPATIENT)
Dept: ELECTROPHYSIOLOGY | Facility: CLINIC | Age: 85
End: 2025-07-29
Payer: MEDICARE

## 2025-07-29 ENCOUNTER — NON-APPOINTMENT (OUTPATIENT)
Age: 85
End: 2025-07-29

## 2025-07-29 PROCEDURE — 93296 REM INTERROG EVL PM/IDS: CPT

## 2025-07-29 PROCEDURE — 93294 REM INTERROG EVL PM/LDLS PM: CPT
